# Patient Record
Sex: FEMALE | Race: WHITE | NOT HISPANIC OR LATINO | Employment: FULL TIME | URBAN - METROPOLITAN AREA
[De-identification: names, ages, dates, MRNs, and addresses within clinical notes are randomized per-mention and may not be internally consistent; named-entity substitution may affect disease eponyms.]

---

## 2017-08-11 ENCOUNTER — MEDICAL CORRESPONDENCE (OUTPATIENT)
Dept: HEALTH INFORMATION MANAGEMENT | Facility: CLINIC | Age: 62
End: 2017-08-11

## 2017-08-19 ENCOUNTER — HEALTH MAINTENANCE LETTER (OUTPATIENT)
Age: 62
End: 2017-08-19

## 2018-08-20 ENCOUNTER — HEALTH MAINTENANCE LETTER (OUTPATIENT)
Age: 63
End: 2018-08-20

## 2019-11-06 ENCOUNTER — HEALTH MAINTENANCE LETTER (OUTPATIENT)
Age: 64
End: 2019-11-06

## 2020-11-29 ENCOUNTER — HEALTH MAINTENANCE LETTER (OUTPATIENT)
Age: 65
End: 2020-11-29

## 2021-02-02 ENCOUNTER — VIRTUAL VISIT (OUTPATIENT)
Dept: FAMILY MEDICINE | Facility: CLINIC | Age: 66
End: 2021-02-02
Payer: COMMERCIAL

## 2021-02-02 DIAGNOSIS — Z99.81 SUPPLEMENTAL OXYGEN DEPENDENT: ICD-10-CM

## 2021-02-02 DIAGNOSIS — E66.01 MORBID OBESITY (H): ICD-10-CM

## 2021-02-02 DIAGNOSIS — R09.02 HYPOXEMIA: ICD-10-CM

## 2021-02-02 DIAGNOSIS — K08.9 POOR DENTITION: ICD-10-CM

## 2021-02-02 DIAGNOSIS — B07.8 OTHER VIRAL WARTS: ICD-10-CM

## 2021-02-02 DIAGNOSIS — G47.30 SLEEP APNEA, UNSPECIFIED TYPE: ICD-10-CM

## 2021-02-02 DIAGNOSIS — E66.2 OBESITY HYPOVENTILATION SYNDROME (H): Primary | ICD-10-CM

## 2021-02-02 DIAGNOSIS — Z87.09 H/O RESPIRATORY FAILURE: ICD-10-CM

## 2021-02-02 PROCEDURE — 99214 OFFICE O/P EST MOD 30 MIN: CPT | Mod: 95 | Performed by: NURSE PRACTITIONER

## 2021-02-02 NOTE — PROGRESS NOTES
Sherwin is a 65 year old who is being evaluated via a billable telephone visit.      What phone number would you like to be contacted at? [phone]  How would you like to obtain your AVS? MyChart  Assessment & Plan     Obesity hypoventilation syndrome (H)  Hypoxemia  H/O respiratory failure  Morbid obesity (H)  Supplemental oxygen dependent  - Advised patient that a face to face visit is required in order for me to order oxygen for her as testing in office is needed.  Patient reports she does not want to come in for a visit.  Offered care coordination referral but she declines.  She will continue with the same current oxygen therapy.    Sleep apnea, unspecified type  Order for BIPAP mask will be mailed to patient per her request.  - Miscellaneous Order for DME - ONLY FOR DME    Poor dentition  Advise follow up with dentistry.    Other viral warts  Recommend office visit for evaluation.  Briefly discussed over the counter treatment options.                               Return in about 1 week (around 2/9/2021) for face to face visit recommended.    Chiqui Gallegos NP  [facility]     Sherwin is a 65 year old who presents to clinic today for the following health issues     HPI     New patient to clinic, last seen in 2013 and has not received medical care since that time.    Requesting oxygen orders but a face-to-face visit would be required for oxygen orders.    [facility] -[phone]  Wants RX for mask for BIPAP  bipap 10 setting    Need appt. To keep Oxygen, getting supplies thru [facility].        Milk causing incontinence.    Foot warts are painful to walk.  Uses a walker.     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  Does not get regular dental care.  Asking about advise and advised to follow up with dentistry.    She is on 3L continuous nasal cannula, oxygen concentrator.  BIPAP at night for sleep  apnea.  Has a home oxygen sat monitor as well.  Was told by [facility] that she needs to be retested.    Car is in winter storage.  Hard to walk and get around.  Doesn't want to come into the clinic.    Previous diagnoses from 2013 include:  1. Chronic hypoxemia, multifactorial   2. Mixed restrictive and obstructive lung disease   3. Morbid obesity   4. Tracheobronchomalacia   5. RICKY   6. Obesity Hypoventilation Syndrome   7. Mild pulmonary hypertension    Her current oxygen prescription 6L with exertion and 3L at rest, though could titrate based on home oximeter reading as able to maintain sats > 88%.    Review of Systems   Constitutional, HEENT, cardiovascular, pulmonary, gi and gu systems are negative, except as otherwise noted.      Objective           Vitals:  No vitals were obtained today due to virtual visit.    Physical Exam   healthy, alert and no distress  PSYCH: Alert and oriented times 3; coherent speech, normal   rate and volume, able to articulate logical thoughts, able   to abstract reason, no tangential thoughts, no hallucinations   or delusions  Her affect is normal  RESP: No cough, no audible wheezing, able to talk in full sentences  Remainder of exam unable to be completed due to telephone visits              Phone call duration: 35 minutes with review of chart, review of patient concerns and review of ongoing plans.    Telephone visit (rather than a office visit) done today due to COVID-19 pandemic.

## 2021-02-03 ENCOUNTER — TELEPHONE (OUTPATIENT)
Dept: FAMILY MEDICINE | Facility: CLINIC | Age: 66
End: 2021-02-03

## 2021-02-03 NOTE — TELEPHONE ENCOUNTER
Forms received from [facility] [place] note documentation request (date range 2/2/2021) for Chiqui Gallegos CNP.  Forms placed in provider 'sign me' folder.  Please fax forms to [phone] after completion.    Zenon Deleon RT (R) (ARRT)  Radiology Dept

## 2021-02-09 NOTE — TELEPHONE ENCOUNTER
Faxed Virtual appointment note that Chiqui WHEELER has had with patient.  This is the only appointment we have done with this patient.    NANCY Bertrand  [facility]

## 2021-02-17 ENCOUNTER — TELEPHONE (OUTPATIENT)
Dept: FAMILY MEDICINE | Facility: CLINIC | Age: 66
End: 2021-02-17

## 2021-02-17 NOTE — TELEPHONE ENCOUNTER
Forms received from [facility] - Chart note documentation request (date range 2/19/2021 to 2/19/2021) for Chiqui Gallegos CNP.  Forms placed in provider 'sign me' folder.  Please fax forms to [phone] after completion.    Zenon Deleon RT (R) (ARRT)  Radiology Dept

## 2021-02-19 ENCOUNTER — OFFICE VISIT (OUTPATIENT)
Dept: FAMILY MEDICINE | Facility: CLINIC | Age: 66
End: 2021-02-19
Payer: COMMERCIAL

## 2021-02-19 DIAGNOSIS — E66.2 OBESITY HYPOVENTILATION SYNDROME (H): Primary | ICD-10-CM

## 2021-02-19 DIAGNOSIS — Z13.220 SCREENING FOR HYPERLIPIDEMIA: ICD-10-CM

## 2021-02-19 DIAGNOSIS — Z13.1 SCREENING FOR DIABETES MELLITUS: ICD-10-CM

## 2021-02-19 DIAGNOSIS — Z87.09 H/O RESPIRATORY FAILURE: ICD-10-CM

## 2021-02-19 DIAGNOSIS — Z99.81 SUPPLEMENTAL OXYGEN DEPENDENT: ICD-10-CM

## 2021-02-19 DIAGNOSIS — G47.30 SLEEP APNEA, UNSPECIFIED TYPE: ICD-10-CM

## 2021-02-19 DIAGNOSIS — R09.02 HYPOXEMIA: ICD-10-CM

## 2021-02-19 PROCEDURE — 99213 OFFICE O/P EST LOW 20 MIN: CPT | Performed by: NURSE PRACTITIONER

## 2021-02-19 PROCEDURE — 36415 COLL VENOUS BLD VENIPUNCTURE: CPT | Performed by: NURSE PRACTITIONER

## 2021-02-19 PROCEDURE — 80061 LIPID PANEL: CPT | Performed by: NURSE PRACTITIONER

## 2021-02-19 PROCEDURE — 80048 BASIC METABOLIC PNL TOTAL CA: CPT | Performed by: NURSE PRACTITIONER

## 2021-02-19 NOTE — PROGRESS NOTES
SpO2 testin) On room air at rest SpO2 is 87%  2) On 3 liters/minute at rest SpO2 is 96%  3) On room air during activity/with exercise SpO2 is 82%  4) On 3 liters/minute during activity/with exercise SpO2 is 88%    Assessment & Plan     Obesity hypoventilation syndrome (H)    - Home Oxygen Order for DME - ONLY FOR DME    Hypoxemia    - Home Oxygen Order for DME - ONLY FOR DME    Supplemental oxygen dependent    - Home Oxygen Order for DME - ONLY FOR DME    H/O respiratory failure    - Home Oxygen Order for DME - ONLY FOR DME    Sleep apnea, unspecified type  Chronic, stable, continue current treatment.     Screening for diabetes mellitus    - Basic metabolic panel  (Ca, Cl, CO2, Creat, Gluc, K, Na, BUN)    Screening for hyperlipidemia    - Lipid panel reflex to direct LDL Non-fasting    Patient has been oxygen dependent since .  Her oxygen saturation testing today supports her medical need of ongoing oxygen treatment.  Recommend continuing with 6L NC oxygen with exertion and 3L at rest. Though could titrate based on home oximeter reading as able to maintain sats >88%.    Will have [facility] to [facility] as requested.     Return in about 3 months (around 2021) for Physical Exam.         Chiqui Gallegos NP  [facility]    Maryann Courtney is a 65 year old who presents for the following health issues      HPI     Patient is here today to get update in home oxygen orders    Her current oxygen prescription 6L with exertion and 3L at rest. Though could titrate based on home oximeter reading as able to maintain sats >88%.      Review of Systems   Constitutional, HEENT, cardiovascular, pulmonary, GI, , musculoskeletal, neuro, skin, endocrine and psych systems are negative, except as otherwise noted.      Objective    /80 (BP Location: Right arm)   Pulse 105   Wt (!) 164.2 kg (362 lb)   SpO2 (!) 88%   BMI 60.24 kg/m    Body mass index is 60.24 kg/m .      SpO2 testin) On room air at rest SpO2 is 87%  2) On 3 liters/minute at rest SpO2 is 96%  3) On room air during activity/with exercise SpO2 is 82%  4) On 3 liters/minute during activity/with exercise SpO2 is 88%    Physical Exam   GENERAL: healthy, alert, no distress and obese  RESP: lungs clear to auscultation - no rales, rhonchi or wheezes  CV: regular rates and rhythm, normal S1 S2, no S3 or S4 and no murmur, click or rub  PSYCH: mentation appears normal, affect normal/bright    Results for orders placed or performed in visit on 21   Lipid panel reflex to direct LDL Non-fasting     Status: None   Result Value Ref Range    Cholesterol 171 <200 mg/dL    Triglycerides 118 <150 mg/dL    HDL Cholesterol 58 >49 mg/dL    LDL Cholesterol Calculated 89 <100 mg/dL    Non HDL Cholesterol 113 <130 mg/dL   Basic metabolic panel  (Ca, Cl, CO2, Creat, Gluc, K, Na, BUN)     Status: None   Result Value Ref Range    Sodium 139 133 - 144 mmol/L    Potassium 4.2 3.4 - 5.3 mmol/L    Chloride 103 94 - 109 mmol/L    Carbon Dioxide 31 20 - 32 mmol/L    Anion Gap 5 3 - 14 mmol/L    Glucose 87 70 - 99 mg/dL    Urea Nitrogen 13 7 - 30 mg/dL    Creatinine 0.75 0.52 - 1.04 mg/dL    GFR Estimate 84 >60 mL/min/[1.73_m2]    GFR Estimate If Black >90 >60 mL/min/[1.73_m2]    Calcium 9.4 8.5 - 10.1 mg/dL

## 2021-02-20 LAB
ANION GAP SERPL CALCULATED.3IONS-SCNC: 5 MMOL/L (ref 3–14)
BUN SERPL-MCNC: 13 MG/DL (ref 7–30)
CALCIUM SERPL-MCNC: 9.4 MG/DL (ref 8.5–10.1)
CHLORIDE SERPL-SCNC: 103 MMOL/L (ref 94–109)
CHOLEST SERPL-MCNC: 171 MG/DL
CO2 SERPL-SCNC: 31 MMOL/L (ref 20–32)
CREAT SERPL-MCNC: 0.75 MG/DL (ref 0.52–1.04)
GFR SERPL CREATININE-BSD FRML MDRD: 84 ML/MIN/{1.73_M2}
GLUCOSE SERPL-MCNC: 87 MG/DL (ref 70–99)
HDLC SERPL-MCNC: 58 MG/DL
LDLC SERPL CALC-MCNC: 89 MG/DL
NONHDLC SERPL-MCNC: 113 MG/DL
POTASSIUM SERPL-SCNC: 4.2 MMOL/L (ref 3.4–5.3)
SODIUM SERPL-SCNC: 139 MMOL/L (ref 133–144)
TRIGL SERPL-MCNC: 118 MG/DL

## 2021-02-21 VITALS
BODY MASS INDEX: 60.24 KG/M2 | SYSTOLIC BLOOD PRESSURE: 128 MMHG | OXYGEN SATURATION: 88 % | HEART RATE: 105 BPM | DIASTOLIC BLOOD PRESSURE: 80 MMHG | WEIGHT: 293 LBS

## 2021-02-22 NOTE — TELEPHONE ENCOUNTER
Please FAX to [facility] recent office visit note and DME oxygen order.   I placed the form in TC team zara wharton.    Chiqui Gallegos, NAYELI, APRN, CNP

## 2021-02-23 NOTE — TELEPHONE ENCOUNTER
Form with last OV notes faxed to Cyndy.  Cynthia Bajwa CMA ([facility])

## 2021-03-03 ENCOUNTER — TELEPHONE (OUTPATIENT)
Dept: FAMILY MEDICINE | Facility: CLINIC | Age: 66
End: 2021-03-03

## 2021-03-03 NOTE — TELEPHONE ENCOUNTER
Forms received from [facility]/ Certificate of Medical Necessity -484- Oxygen (initial date:2/19/21) for Chiqui Gallegos CNP.  Forms placed in provider 'sign me' folder.  Please fax forms to [phone] after completion.    Zenon Deleon RT (R) (ARRT)  Radiology Dept

## 2021-03-05 ENCOUNTER — MEDICAL CORRESPONDENCE (OUTPATIENT)
Dept: HEALTH INFORMATION MANAGEMENT | Facility: CLINIC | Age: 66
End: 2021-03-05

## 2021-03-05 NOTE — TELEPHONE ENCOUNTER
Form completed and placed into team zara  bin.    Please make sure we have a copy for our records.    Chiqui Gallegos, NAYELI, APRN, CNP

## 2021-03-05 NOTE — TELEPHONE ENCOUNTER
Unable to locate form that was faxed on 2/23. Spoke with Cyndy and they state they have not received this form, only records.    Arsh Ulrich

## 2021-03-09 NOTE — TELEPHONE ENCOUNTER
Form faxed to [facility], copy sent to abstract.  Cynthia Bajwa CMA ([facility])

## 2021-09-19 ENCOUNTER — HEALTH MAINTENANCE LETTER (OUTPATIENT)
Age: 66
End: 2021-09-19

## 2021-11-14 ENCOUNTER — HEALTH MAINTENANCE LETTER (OUTPATIENT)
Age: 66
End: 2021-11-14

## 2022-01-09 ENCOUNTER — HEALTH MAINTENANCE LETTER (OUTPATIENT)
Age: 67
End: 2022-01-09

## 2022-02-17 ENCOUNTER — TELEPHONE (OUTPATIENT)
Dept: FAMILY MEDICINE | Facility: CLINIC | Age: 67
End: 2022-02-17
Payer: COMMERCIAL

## 2022-02-17 NOTE — TELEPHONE ENCOUNTER
Forms received from [facility]/ Confirmation of Verbal Order for Home Medical Equipment (date: 2/17/22) for Chiqui Gallegos NP.  Forms placed in provider 'sign me' folder.  Please fax forms to [phone] after completion.    Zenon Deleon RT (R) (ARRT)  Radiology Dept

## 2022-11-21 ENCOUNTER — HEALTH MAINTENANCE LETTER (OUTPATIENT)
Age: 67
End: 2022-11-21

## 2023-01-01 ENCOUNTER — HEALTH MAINTENANCE LETTER (OUTPATIENT)
Age: 68
End: 2023-01-01

## 2023-01-23 ENCOUNTER — TELEPHONE (OUTPATIENT)
Dept: FAMILY MEDICINE | Facility: CLINIC | Age: 68
End: 2023-01-23
Payer: COMMERCIAL

## 2023-01-23 NOTE — TELEPHONE ENCOUNTER
Forms received from Letter of Medical Necessity for sleep Apnea for Chiqui Gallegos.  Forms placed in provider 'sign me' folder.  Please FAX forms to [phone] after completion.      Marcia Bejarano    [facility]

## 2023-02-27 NOTE — TELEPHONE ENCOUNTER
----- Message from Chasity Hernandez on behalf of Katlin Hernandez sent at 2/27/2023  2:57 PM EST -----  Regarding: Cough, and dark circles under eyes  Contact: [phone]  This message is being sent by Chasity Hernandez on behalf of Katlin Hernandez.    Katlin has tested negative for COVID twice now. I am only one in house testing positive. I just need to get Katlin in for her cough.    I believe I already did the required form for home oxygen.  Please see 2/17/21 telephone encounter and can you call to verify if they received form at that time?  This form looks the same to me.    Chiqui Gallegos, DNP, APRN, CNP

## 2023-04-16 ENCOUNTER — HEALTH MAINTENANCE LETTER (OUTPATIENT)
Age: 68
End: 2023-04-16

## 2024-01-01 ENCOUNTER — APPOINTMENT (OUTPATIENT)
Dept: ULTRASOUND IMAGING | Facility: CLINIC | Age: 69
DRG: 749 | End: 2024-01-01
Attending: EMERGENCY MEDICINE
Payer: COMMERCIAL

## 2024-01-01 ENCOUNTER — APPOINTMENT (OUTPATIENT)
Dept: OCCUPATIONAL THERAPY | Facility: CLINIC | Age: 69
DRG: 749 | End: 2024-01-01
Payer: COMMERCIAL

## 2024-01-01 ENCOUNTER — APPOINTMENT (OUTPATIENT)
Dept: CT IMAGING | Facility: CLINIC | Age: 69
DRG: 749 | End: 2024-01-01
Attending: EMERGENCY MEDICINE
Payer: COMMERCIAL

## 2024-01-01 ENCOUNTER — APPOINTMENT (OUTPATIENT)
Dept: CT IMAGING | Facility: CLINIC | Age: 69
DRG: 749 | End: 2024-01-01
Attending: NURSE PRACTITIONER
Payer: COMMERCIAL

## 2024-01-01 ENCOUNTER — HOSPITAL ENCOUNTER (INPATIENT)
Facility: CLINIC | Age: 69
LOS: 7 days | DRG: 749 | End: 2024-09-12
Attending: EMERGENCY MEDICINE | Admitting: INTERNAL MEDICINE
Payer: COMMERCIAL

## 2024-01-01 ENCOUNTER — APPOINTMENT (OUTPATIENT)
Dept: GENERAL RADIOLOGY | Facility: CLINIC | Age: 69
DRG: 749 | End: 2024-01-01
Attending: PHYSICIAN ASSISTANT
Payer: COMMERCIAL

## 2024-01-01 ENCOUNTER — NURSE TRIAGE (OUTPATIENT)
Dept: FAMILY MEDICINE | Facility: CLINIC | Age: 69
End: 2024-01-01
Payer: COMMERCIAL

## 2024-01-01 ENCOUNTER — APPOINTMENT (OUTPATIENT)
Dept: CARDIOLOGY | Facility: CLINIC | Age: 69
DRG: 749 | End: 2024-01-01
Attending: NURSE PRACTITIONER
Payer: COMMERCIAL

## 2024-01-01 ENCOUNTER — APPOINTMENT (OUTPATIENT)
Dept: INTERVENTIONAL RADIOLOGY/VASCULAR | Facility: CLINIC | Age: 69
DRG: 749 | End: 2024-01-01
Attending: PHYSICIAN ASSISTANT
Payer: COMMERCIAL

## 2024-01-01 ENCOUNTER — APPOINTMENT (OUTPATIENT)
Dept: CARDIOLOGY | Facility: CLINIC | Age: 69
DRG: 749 | End: 2024-01-01
Attending: PHYSICIAN ASSISTANT
Payer: COMMERCIAL

## 2024-01-01 ENCOUNTER — APPOINTMENT (OUTPATIENT)
Dept: OCCUPATIONAL THERAPY | Facility: CLINIC | Age: 69
DRG: 749 | End: 2024-01-01
Attending: INTERNAL MEDICINE
Payer: COMMERCIAL

## 2024-01-01 ENCOUNTER — APPOINTMENT (OUTPATIENT)
Dept: GENERAL RADIOLOGY | Facility: CLINIC | Age: 69
DRG: 749 | End: 2024-01-01
Attending: EMERGENCY MEDICINE
Payer: COMMERCIAL

## 2024-01-01 ENCOUNTER — HEALTH MAINTENANCE LETTER (OUTPATIENT)
Age: 69
End: 2024-01-01

## 2024-01-01 ENCOUNTER — APPOINTMENT (OUTPATIENT)
Dept: GENERAL RADIOLOGY | Facility: CLINIC | Age: 69
DRG: 749 | End: 2024-01-01
Attending: INTERNAL MEDICINE
Payer: COMMERCIAL

## 2024-01-01 VITALS
RESPIRATION RATE: 15 BRPM | DIASTOLIC BLOOD PRESSURE: 53 MMHG | WEIGHT: 260.36 LBS | SYSTOLIC BLOOD PRESSURE: 106 MMHG | BODY MASS INDEX: 41.84 KG/M2 | HEART RATE: 71 BPM | HEIGHT: 66 IN | TEMPERATURE: 97.5 F | OXYGEN SATURATION: 93 %

## 2024-01-01 DIAGNOSIS — J96.21 ACUTE AND CHRONIC RESPIRATORY FAILURE WITH HYPOXIA (H): ICD-10-CM

## 2024-01-01 DIAGNOSIS — I26.94 MULTIPLE SUBSEGMENTAL PULMONARY EMBOLI WITHOUT ACUTE COR PULMONALE (H): ICD-10-CM

## 2024-01-01 DIAGNOSIS — D64.9 ANEMIA, UNSPECIFIED TYPE: Primary | ICD-10-CM

## 2024-01-01 DIAGNOSIS — L89.896 PRESSURE INJURY OF DEEP TISSUE OF OTHER SITE: ICD-10-CM

## 2024-01-01 LAB
ABO/RH(D): NORMAL
ABO/RH(D): NORMAL
ALBUMIN SERPL BCG-MCNC: 2.3 G/DL (ref 3.5–5.2)
ALBUMIN SERPL BCG-MCNC: 2.4 G/DL (ref 3.5–5.2)
ALBUMIN SERPL BCG-MCNC: 2.7 G/DL (ref 3.5–5.2)
ALBUMIN UR-MCNC: 30 MG/DL
ALP SERPL-CCNC: 114 U/L (ref 40–150)
ALP SERPL-CCNC: 115 U/L (ref 40–150)
ALP SERPL-CCNC: 126 U/L (ref 40–150)
ALT SERPL W P-5'-P-CCNC: 11 U/L (ref 0–50)
ALT SERPL W P-5'-P-CCNC: <5 U/L (ref 0–50)
ALT SERPL W P-5'-P-CCNC: <5 U/L (ref 0–50)
ANION GAP SERPL CALCULATED.3IONS-SCNC: 10 MMOL/L (ref 7–15)
ANION GAP SERPL CALCULATED.3IONS-SCNC: 10 MMOL/L (ref 7–15)
ANION GAP SERPL CALCULATED.3IONS-SCNC: 11 MMOL/L (ref 7–15)
ANION GAP SERPL CALCULATED.3IONS-SCNC: 14 MMOL/L (ref 7–15)
ANION GAP SERPL CALCULATED.3IONS-SCNC: 17 MMOL/L (ref 7–15)
ANION GAP SERPL CALCULATED.3IONS-SCNC: 6 MMOL/L (ref 7–15)
ANION GAP SERPL CALCULATED.3IONS-SCNC: 7 MMOL/L (ref 7–15)
ANION GAP SERPL CALCULATED.3IONS-SCNC: 8 MMOL/L (ref 7–15)
ANION GAP SERPL CALCULATED.3IONS-SCNC: 8 MMOL/L (ref 7–15)
ANION GAP SERPL CALCULATED.3IONS-SCNC: 9 MMOL/L (ref 7–15)
ANION GAP SERPL CALCULATED.3IONS-SCNC: 9 MMOL/L (ref 7–15)
ANTIBODY SCREEN: NEGATIVE
ANTIBODY SCREEN: NEGATIVE
APPEARANCE UR: CLEAR
AST SERPL W P-5'-P-CCNC: 17 U/L (ref 0–45)
AST SERPL W P-5'-P-CCNC: 17 U/L (ref 0–45)
AST SERPL W P-5'-P-CCNC: 18 U/L (ref 0–45)
ATRIAL RATE - MUSE: 108 BPM
ATRIAL RATE - MUSE: 116 BPM
ATRIAL RATE - MUSE: 85 BPM
ATRIAL RATE - MUSE: 89 BPM
ATRIAL RATE - MUSE: 89 BPM
ATRIAL RATE - MUSE: 91 BPM
B-OH-BUTYR SERPL-SCNC: <0.18 MMOL/L
BACTERIA BLD CULT: NO GROWTH
BACTERIA BLD CULT: NO GROWTH
BASE EXCESS BLDV CALC-SCNC: -2.2 MMOL/L (ref -3–3)
BASE EXCESS BLDV CALC-SCNC: -2.7 MMOL/L (ref -3–3)
BASE EXCESS BLDV CALC-SCNC: -2.9 MMOL/L (ref -3–3)
BASE EXCESS BLDV CALC-SCNC: -3.3 MMOL/L (ref -3–3)
BASE EXCESS BLDV CALC-SCNC: -4.6 MMOL/L (ref -3–3)
BASE EXCESS BLDV CALC-SCNC: -4.6 MMOL/L (ref -3–3)
BASE EXCESS BLDV CALC-SCNC: -5.8 MMOL/L (ref -3–3)
BASE EXCESS BLDV CALC-SCNC: -7.6 MMOL/L (ref -3–3)
BASOPHILS # BLD AUTO: 0 10E3/UL (ref 0–0.2)
BASOPHILS # BLD AUTO: 0.1 10E3/UL (ref 0–0.2)
BASOPHILS NFR BLD AUTO: 0 %
BILIRUB SERPL-MCNC: 0.3 MG/DL
BILIRUB UR QL STRIP: NEGATIVE
BLD PROD TYP BPU: NORMAL
BLOOD COMPONENT TYPE: NORMAL
BUN SERPL-MCNC: 27.8 MG/DL (ref 8–23)
BUN SERPL-MCNC: 28 MG/DL (ref 8–23)
BUN SERPL-MCNC: 28.1 MG/DL (ref 8–23)
BUN SERPL-MCNC: 28.4 MG/DL (ref 8–23)
BUN SERPL-MCNC: 29.1 MG/DL (ref 8–23)
BUN SERPL-MCNC: 29.6 MG/DL (ref 8–23)
BUN SERPL-MCNC: 29.7 MG/DL (ref 8–23)
BUN SERPL-MCNC: 30 MG/DL (ref 8–23)
BUN SERPL-MCNC: 30.9 MG/DL (ref 8–23)
BUN SERPL-MCNC: 32.9 MG/DL (ref 8–23)
BUN SERPL-MCNC: 33 MG/DL (ref 8–23)
BUN SERPL-MCNC: 35.5 MG/DL (ref 8–23)
BUN SERPL-MCNC: 36.2 MG/DL (ref 8–23)
C PNEUM DNA SPEC QL NAA+PROBE: NOT DETECTED
C PNEUM DNA SPEC QL NAA+PROBE: NOT DETECTED
CALCIUM SERPL-MCNC: 7 MG/DL (ref 8.8–10.4)
CALCIUM SERPL-MCNC: 7.8 MG/DL (ref 8.8–10.4)
CALCIUM SERPL-MCNC: 7.9 MG/DL (ref 8.8–10.4)
CALCIUM SERPL-MCNC: 7.9 MG/DL (ref 8.8–10.4)
CALCIUM SERPL-MCNC: 8 MG/DL (ref 8.8–10.4)
CALCIUM SERPL-MCNC: 8 MG/DL (ref 8.8–10.4)
CALCIUM SERPL-MCNC: 8.1 MG/DL (ref 8.8–10.4)
CALCIUM SERPL-MCNC: 8.2 MG/DL (ref 8.8–10.4)
CALCIUM SERPL-MCNC: 8.2 MG/DL (ref 8.8–10.4)
CHLORIDE SERPL-SCNC: 105 MMOL/L (ref 98–107)
CHLORIDE SERPL-SCNC: 106 MMOL/L (ref 98–107)
CHLORIDE SERPL-SCNC: 109 MMOL/L (ref 98–107)
CHLORIDE SERPL-SCNC: 110 MMOL/L (ref 98–107)
CHLORIDE SERPL-SCNC: 112 MMOL/L (ref 98–107)
CHLORIDE SERPL-SCNC: 112 MMOL/L (ref 98–107)
CODING SYSTEM: NORMAL
COLOR UR AUTO: ABNORMAL
CREAT SERPL-MCNC: 0.99 MG/DL (ref 0.51–0.95)
CREAT SERPL-MCNC: 0.99 MG/DL (ref 0.51–0.95)
CREAT SERPL-MCNC: 1 MG/DL (ref 0.51–0.95)
CREAT SERPL-MCNC: 1.01 MG/DL (ref 0.51–0.95)
CREAT SERPL-MCNC: 1.01 MG/DL (ref 0.51–0.95)
CREAT SERPL-MCNC: 1.07 MG/DL (ref 0.51–0.95)
CREAT SERPL-MCNC: 1.07 MG/DL (ref 0.51–0.95)
CREAT SERPL-MCNC: 1.09 MG/DL (ref 0.51–0.95)
CREAT SERPL-MCNC: 1.19 MG/DL (ref 0.51–0.95)
CREAT SERPL-MCNC: 1.22 MG/DL (ref 0.51–0.95)
CREAT SERPL-MCNC: 1.53 MG/DL (ref 0.51–0.95)
CREAT SERPL-MCNC: 1.53 MG/DL (ref 0.51–0.95)
CREAT SERPL-MCNC: 1.63 MG/DL (ref 0.51–0.95)
CROSSMATCH: NORMAL
D DIMER PPP FEU-MCNC: 2.91 UG/ML FEU (ref 0–0.5)
DIASTOLIC BLOOD PRESSURE - MUSE: NORMAL MMHG
EGFRCR SERPLBLD CKD-EPI 2021: 34 ML/MIN/1.73M2
EGFRCR SERPLBLD CKD-EPI 2021: 37 ML/MIN/1.73M2
EGFRCR SERPLBLD CKD-EPI 2021: 37 ML/MIN/1.73M2
EGFRCR SERPLBLD CKD-EPI 2021: 48 ML/MIN/1.73M2
EGFRCR SERPLBLD CKD-EPI 2021: 50 ML/MIN/1.73M2
EGFRCR SERPLBLD CKD-EPI 2021: 55 ML/MIN/1.73M2
EGFRCR SERPLBLD CKD-EPI 2021: 56 ML/MIN/1.73M2
EGFRCR SERPLBLD CKD-EPI 2021: 56 ML/MIN/1.73M2
EGFRCR SERPLBLD CKD-EPI 2021: 60 ML/MIN/1.73M2
EGFRCR SERPLBLD CKD-EPI 2021: 60 ML/MIN/1.73M2
EGFRCR SERPLBLD CKD-EPI 2021: 61 ML/MIN/1.73M2
EGFRCR SERPLBLD CKD-EPI 2021: 62 ML/MIN/1.73M2
EGFRCR SERPLBLD CKD-EPI 2021: 62 ML/MIN/1.73M2
EOSINOPHIL # BLD AUTO: 0 10E3/UL (ref 0–0.7)
EOSINOPHIL # BLD AUTO: 0.1 10E3/UL (ref 0–0.7)
EOSINOPHIL # BLD AUTO: 0.2 10E3/UL (ref 0–0.7)
EOSINOPHIL # BLD AUTO: 0.2 10E3/UL (ref 0–0.7)
EOSINOPHIL # BLD AUTO: 0.3 10E3/UL (ref 0–0.7)
EOSINOPHIL # BLD AUTO: 0.3 10E3/UL (ref 0–0.7)
EOSINOPHIL # BLD AUTO: 0.4 10E3/UL (ref 0–0.7)
EOSINOPHIL # BLD AUTO: 0.6 10E3/UL (ref 0–0.7)
EOSINOPHIL # BLD AUTO: 0.6 10E3/UL (ref 0–0.7)
EOSINOPHIL NFR BLD AUTO: 0 %
EOSINOPHIL NFR BLD AUTO: 0 %
EOSINOPHIL NFR BLD AUTO: 1 %
EOSINOPHIL NFR BLD AUTO: 2 %
ERYTHROCYTE [DISTWIDTH] IN BLOOD BY AUTOMATED COUNT: 22.5 % (ref 10–15)
ERYTHROCYTE [DISTWIDTH] IN BLOOD BY AUTOMATED COUNT: 22.7 % (ref 10–15)
ERYTHROCYTE [DISTWIDTH] IN BLOOD BY AUTOMATED COUNT: 22.7 % (ref 10–15)
ERYTHROCYTE [DISTWIDTH] IN BLOOD BY AUTOMATED COUNT: 22.8 % (ref 10–15)
ERYTHROCYTE [DISTWIDTH] IN BLOOD BY AUTOMATED COUNT: 22.8 % (ref 10–15)
ERYTHROCYTE [DISTWIDTH] IN BLOOD BY AUTOMATED COUNT: 23.1 % (ref 10–15)
ERYTHROCYTE [DISTWIDTH] IN BLOOD BY AUTOMATED COUNT: 23.2 % (ref 10–15)
ERYTHROCYTE [DISTWIDTH] IN BLOOD BY AUTOMATED COUNT: 23.2 % (ref 10–15)
ERYTHROCYTE [DISTWIDTH] IN BLOOD BY AUTOMATED COUNT: 23.7 % (ref 10–15)
ERYTHROCYTE [DISTWIDTH] IN BLOOD BY AUTOMATED COUNT: 23.7 % (ref 10–15)
FERRITIN SERPL-MCNC: 7 NG/ML (ref 11–328)
FLUAV H1 2009 PAND RNA SPEC QL NAA+PROBE: NOT DETECTED
FLUAV H1 2009 PAND RNA SPEC QL NAA+PROBE: NOT DETECTED
FLUAV H1 RNA SPEC QL NAA+PROBE: NOT DETECTED
FLUAV H1 RNA SPEC QL NAA+PROBE: NOT DETECTED
FLUAV H3 RNA SPEC QL NAA+PROBE: NOT DETECTED
FLUAV H3 RNA SPEC QL NAA+PROBE: NOT DETECTED
FLUAV RNA SPEC QL NAA+PROBE: NOT DETECTED
FLUAV RNA SPEC QL NAA+PROBE: NOT DETECTED
FLUBV RNA SPEC QL NAA+PROBE: NOT DETECTED
FLUBV RNA SPEC QL NAA+PROBE: NOT DETECTED
GLUCOSE BLDC GLUCOMTR-MCNC: 100 MG/DL (ref 70–99)
GLUCOSE BLDC GLUCOMTR-MCNC: 101 MG/DL (ref 70–99)
GLUCOSE BLDC GLUCOMTR-MCNC: 101 MG/DL (ref 70–99)
GLUCOSE BLDC GLUCOMTR-MCNC: 109 MG/DL (ref 70–99)
GLUCOSE BLDC GLUCOMTR-MCNC: 111 MG/DL (ref 70–99)
GLUCOSE BLDC GLUCOMTR-MCNC: 112 MG/DL (ref 70–99)
GLUCOSE BLDC GLUCOMTR-MCNC: 136 MG/DL (ref 70–99)
GLUCOSE BLDC GLUCOMTR-MCNC: 62 MG/DL (ref 70–99)
GLUCOSE BLDC GLUCOMTR-MCNC: 68 MG/DL (ref 70–99)
GLUCOSE BLDC GLUCOMTR-MCNC: 70 MG/DL (ref 70–99)
GLUCOSE BLDC GLUCOMTR-MCNC: 73 MG/DL (ref 70–99)
GLUCOSE BLDC GLUCOMTR-MCNC: 74 MG/DL (ref 70–99)
GLUCOSE BLDC GLUCOMTR-MCNC: 75 MG/DL (ref 70–99)
GLUCOSE BLDC GLUCOMTR-MCNC: 76 MG/DL (ref 70–99)
GLUCOSE BLDC GLUCOMTR-MCNC: 77 MG/DL (ref 70–99)
GLUCOSE BLDC GLUCOMTR-MCNC: 78 MG/DL (ref 70–99)
GLUCOSE BLDC GLUCOMTR-MCNC: 79 MG/DL (ref 70–99)
GLUCOSE BLDC GLUCOMTR-MCNC: 81 MG/DL (ref 70–99)
GLUCOSE BLDC GLUCOMTR-MCNC: 83 MG/DL (ref 70–99)
GLUCOSE BLDC GLUCOMTR-MCNC: 84 MG/DL (ref 70–99)
GLUCOSE BLDC GLUCOMTR-MCNC: 84 MG/DL (ref 70–99)
GLUCOSE BLDC GLUCOMTR-MCNC: 85 MG/DL (ref 70–99)
GLUCOSE BLDC GLUCOMTR-MCNC: 86 MG/DL (ref 70–99)
GLUCOSE BLDC GLUCOMTR-MCNC: 86 MG/DL (ref 70–99)
GLUCOSE BLDC GLUCOMTR-MCNC: 88 MG/DL (ref 70–99)
GLUCOSE BLDC GLUCOMTR-MCNC: 89 MG/DL (ref 70–99)
GLUCOSE BLDC GLUCOMTR-MCNC: 89 MG/DL (ref 70–99)
GLUCOSE BLDC GLUCOMTR-MCNC: 93 MG/DL (ref 70–99)
GLUCOSE BLDC GLUCOMTR-MCNC: 94 MG/DL (ref 70–99)
GLUCOSE BLDC GLUCOMTR-MCNC: 99 MG/DL (ref 70–99)
GLUCOSE SERPL-MCNC: 104 MG/DL (ref 70–99)
GLUCOSE SERPL-MCNC: 105 MG/DL (ref 70–99)
GLUCOSE SERPL-MCNC: 106 MG/DL (ref 70–99)
GLUCOSE SERPL-MCNC: 111 MG/DL (ref 70–99)
GLUCOSE SERPL-MCNC: 61 MG/DL (ref 70–99)
GLUCOSE SERPL-MCNC: 64 MG/DL (ref 70–99)
GLUCOSE SERPL-MCNC: 70 MG/DL (ref 70–99)
GLUCOSE SERPL-MCNC: 72 MG/DL (ref 70–99)
GLUCOSE SERPL-MCNC: 73 MG/DL (ref 70–99)
GLUCOSE SERPL-MCNC: 76 MG/DL (ref 70–99)
GLUCOSE SERPL-MCNC: 84 MG/DL (ref 70–99)
GLUCOSE SERPL-MCNC: 84 MG/DL (ref 70–99)
GLUCOSE SERPL-MCNC: 85 MG/DL (ref 70–99)
GLUCOSE UR STRIP-MCNC: NEGATIVE MG/DL
HADV DNA SPEC QL NAA+PROBE: NOT DETECTED
HADV DNA SPEC QL NAA+PROBE: NOT DETECTED
HCO3 BLDV-SCNC: 21 MMOL/L (ref 21–28)
HCO3 BLDV-SCNC: 23 MMOL/L (ref 21–28)
HCO3 BLDV-SCNC: 24 MMOL/L (ref 21–28)
HCO3 SERPL-SCNC: 19 MMOL/L (ref 22–29)
HCO3 SERPL-SCNC: 19 MMOL/L (ref 22–29)
HCO3 SERPL-SCNC: 20 MMOL/L (ref 22–29)
HCO3 SERPL-SCNC: 20 MMOL/L (ref 22–29)
HCO3 SERPL-SCNC: 21 MMOL/L (ref 22–29)
HCO3 SERPL-SCNC: 21 MMOL/L (ref 22–29)
HCO3 SERPL-SCNC: 22 MMOL/L (ref 22–29)
HCO3 SERPL-SCNC: 23 MMOL/L (ref 22–29)
HCOV PNL SPEC NAA+PROBE: NOT DETECTED
HCOV PNL SPEC NAA+PROBE: NOT DETECTED
HCT VFR BLD AUTO: 18.6 % (ref 35–47)
HCT VFR BLD AUTO: 23.5 % (ref 35–47)
HCT VFR BLD AUTO: 24.9 % (ref 35–47)
HCT VFR BLD AUTO: 25 % (ref 35–47)
HCT VFR BLD AUTO: 25.5 % (ref 35–47)
HCT VFR BLD AUTO: 25.6 % (ref 35–47)
HCT VFR BLD AUTO: 25.7 % (ref 35–47)
HCT VFR BLD AUTO: 25.8 % (ref 35–47)
HCT VFR BLD AUTO: 26.1 % (ref 35–47)
HCT VFR BLD AUTO: 27 % (ref 35–47)
HCT VFR BLD AUTO: 27.1 % (ref 35–47)
HCT VFR BLD AUTO: 28.8 % (ref 35–47)
HCT VFR BLD AUTO: 28.9 % (ref 35–47)
HCT VFR BLD AUTO: 29 % (ref 35–47)
HGB BLD-MCNC: 4.9 G/DL (ref 11.7–15.7)
HGB BLD-MCNC: 6.8 G/DL (ref 11.7–15.7)
HGB BLD-MCNC: 6.8 G/DL (ref 11.7–15.7)
HGB BLD-MCNC: 7 G/DL (ref 11.7–15.7)
HGB BLD-MCNC: 7.2 G/DL (ref 11.7–15.7)
HGB BLD-MCNC: 7.3 G/DL (ref 11.7–15.7)
HGB BLD-MCNC: 7.4 G/DL (ref 11.7–15.7)
HGB BLD-MCNC: 7.4 G/DL (ref 11.7–15.7)
HGB BLD-MCNC: 7.5 G/DL (ref 11.7–15.7)
HGB BLD-MCNC: 7.5 G/DL (ref 11.7–15.7)
HGB BLD-MCNC: 7.7 G/DL (ref 11.7–15.7)
HGB BLD-MCNC: 7.7 G/DL (ref 11.7–15.7)
HGB BLD-MCNC: 7.8 G/DL (ref 11.7–15.7)
HGB BLD-MCNC: 7.9 G/DL (ref 11.7–15.7)
HGB BLD-MCNC: 8 G/DL (ref 11.7–15.7)
HGB BLD-MCNC: 8 G/DL (ref 11.7–15.7)
HGB BLD-MCNC: 8.1 G/DL (ref 11.7–15.7)
HGB BLD-MCNC: 8.2 G/DL (ref 11.7–15.7)
HGB BLD-MCNC: 8.2 G/DL (ref 11.7–15.7)
HGB UR QL STRIP: NEGATIVE
HMPV RNA SPEC QL NAA+PROBE: NOT DETECTED
HMPV RNA SPEC QL NAA+PROBE: NOT DETECTED
HOLD SPECIMEN: NORMAL
HOLD SPECIMEN: NORMAL
HPIV1 RNA SPEC QL NAA+PROBE: NOT DETECTED
HPIV1 RNA SPEC QL NAA+PROBE: NOT DETECTED
HPIV2 RNA SPEC QL NAA+PROBE: NOT DETECTED
HPIV2 RNA SPEC QL NAA+PROBE: NOT DETECTED
HPIV3 RNA SPEC QL NAA+PROBE: NOT DETECTED
HPIV3 RNA SPEC QL NAA+PROBE: NOT DETECTED
HPIV4 RNA SPEC QL NAA+PROBE: NOT DETECTED
HPIV4 RNA SPEC QL NAA+PROBE: NOT DETECTED
IMM GRANULOCYTES # BLD: 0.2 10E3/UL
IMM GRANULOCYTES # BLD: 0.3 10E3/UL
IMM GRANULOCYTES # BLD: 0.3 10E3/UL
IMM GRANULOCYTES NFR BLD: 1 %
IMM GRANULOCYTES NFR BLD: 2 %
INR PPP: 1.4 (ref 0.85–1.15)
INTERPRETATION ECG - MUSE: NORMAL
IRON BINDING CAPACITY (ROCHE): 248 UG/DL (ref 240–430)
IRON SATN MFR SERPL: 4 % (ref 15–46)
IRON SERPL-MCNC: 9 UG/DL (ref 37–145)
ISSUE DATE AND TIME: NORMAL
KETONES UR STRIP-MCNC: NEGATIVE MG/DL
LAB DIRECTOR COMMENTS: NORMAL
LAB DIRECTOR DISCLAIMER: NORMAL
LAB DIRECTOR INTERPRETATION: NORMAL
LAB DIRECTOR METHODOLOGY: NORMAL
LAB DIRECTOR RESULTS: NORMAL
LACTATE SERPL-SCNC: 0.6 MMOL/L (ref 0.7–2)
LACTATE SERPL-SCNC: 0.7 MMOL/L (ref 0.7–2)
LACTATE SERPL-SCNC: 0.9 MMOL/L (ref 0.7–2)
LACTATE SERPL-SCNC: 1 MMOL/L (ref 0.7–2)
LACTATE SERPL-SCNC: 1.1 MMOL/L (ref 0.7–2)
LACTATE SERPL-SCNC: 1.2 MMOL/L (ref 0.7–2)
LEUKOCYTE ESTERASE UR QL STRIP: NEGATIVE
LIPASE SERPL-CCNC: 14 U/L (ref 13–60)
LVEF ECHO: NORMAL
LVEF ECHO: NORMAL
LYMPHOCYTES # BLD AUTO: 1.1 10E3/UL (ref 0.8–5.3)
LYMPHOCYTES # BLD AUTO: 1.2 10E3/UL (ref 0.8–5.3)
LYMPHOCYTES # BLD AUTO: 1.4 10E3/UL (ref 0.8–5.3)
LYMPHOCYTES # BLD AUTO: 1.4 10E3/UL (ref 0.8–5.3)
LYMPHOCYTES # BLD AUTO: 1.6 10E3/UL (ref 0.8–5.3)
LYMPHOCYTES # BLD AUTO: 1.7 10E3/UL (ref 0.8–5.3)
LYMPHOCYTES # BLD AUTO: 1.8 10E3/UL (ref 0.8–5.3)
LYMPHOCYTES NFR BLD AUTO: 4 %
LYMPHOCYTES NFR BLD AUTO: 5 %
LYMPHOCYTES NFR BLD AUTO: 5 %
LYMPHOCYTES NFR BLD AUTO: 6 %
LYMPHOCYTES NFR BLD AUTO: 6 %
LYMPHOCYTES NFR BLD AUTO: 8 %
LYMPHOCYTES NFR BLD AUTO: 8 %
LYMPHOCYTES NFR BLD AUTO: 9 %
LYMPHOCYTES NFR BLD AUTO: 9 %
M PNEUMO DNA SPEC QL NAA+PROBE: NOT DETECTED
M PNEUMO DNA SPEC QL NAA+PROBE: NOT DETECTED
MAGNESIUM SERPL-MCNC: 1.9 MG/DL (ref 1.7–2.3)
MAGNESIUM SERPL-MCNC: 2.1 MG/DL (ref 1.7–2.3)
MAGNESIUM SERPL-MCNC: 2.2 MG/DL (ref 1.7–2.3)
MCH RBC QN AUTO: 18.6 PG (ref 26.5–33)
MCH RBC QN AUTO: 21.6 PG (ref 26.5–33)
MCH RBC QN AUTO: 21.6 PG (ref 26.5–33)
MCH RBC QN AUTO: 21.7 PG (ref 26.5–33)
MCH RBC QN AUTO: 21.8 PG (ref 26.5–33)
MCH RBC QN AUTO: 22 PG (ref 26.5–33)
MCH RBC QN AUTO: 22.7 PG (ref 26.5–33)
MCH RBC QN AUTO: 22.9 PG (ref 26.5–33)
MCH RBC QN AUTO: 23 PG (ref 26.5–33)
MCH RBC QN AUTO: 23 PG (ref 26.5–33)
MCH RBC QN AUTO: 23.1 PG (ref 26.5–33)
MCH RBC QN AUTO: 23.1 PG (ref 26.5–33)
MCH RBC QN AUTO: 23.2 PG (ref 26.5–33)
MCH RBC QN AUTO: 23.2 PG (ref 26.5–33)
MCHC RBC AUTO-ENTMCNC: 26.3 G/DL (ref 31.5–36.5)
MCHC RBC AUTO-ENTMCNC: 27.3 G/DL (ref 31.5–36.5)
MCHC RBC AUTO-ENTMCNC: 27.4 G/DL (ref 31.5–36.5)
MCHC RBC AUTO-ENTMCNC: 27.9 G/DL (ref 31.5–36.5)
MCHC RBC AUTO-ENTMCNC: 27.9 G/DL (ref 31.5–36.5)
MCHC RBC AUTO-ENTMCNC: 28 G/DL (ref 31.5–36.5)
MCHC RBC AUTO-ENTMCNC: 28.1 G/DL (ref 31.5–36.5)
MCHC RBC AUTO-ENTMCNC: 28.1 G/DL (ref 31.5–36.5)
MCHC RBC AUTO-ENTMCNC: 28.4 G/DL (ref 31.5–36.5)
MCHC RBC AUTO-ENTMCNC: 28.6 G/DL (ref 31.5–36.5)
MCHC RBC AUTO-ENTMCNC: 28.9 G/DL (ref 31.5–36.5)
MCHC RBC AUTO-ENTMCNC: 29.2 G/DL (ref 31.5–36.5)
MCV RBC AUTO: 71 FL (ref 78–100)
MCV RBC AUTO: 77 FL (ref 78–100)
MCV RBC AUTO: 77 FL (ref 78–100)
MCV RBC AUTO: 79 FL (ref 78–100)
MCV RBC AUTO: 80 FL (ref 78–100)
MCV RBC AUTO: 80 FL (ref 78–100)
MCV RBC AUTO: 81 FL (ref 78–100)
MCV RBC AUTO: 82 FL (ref 78–100)
MCV RBC AUTO: 82 FL (ref 78–100)
MONOCYTES # BLD AUTO: 0.9 10E3/UL (ref 0–1.3)
MONOCYTES # BLD AUTO: 1 10E3/UL (ref 0–1.3)
MONOCYTES # BLD AUTO: 1.1 10E3/UL (ref 0–1.3)
MONOCYTES # BLD AUTO: 1.2 10E3/UL (ref 0–1.3)
MONOCYTES # BLD AUTO: 1.2 10E3/UL (ref 0–1.3)
MONOCYTES # BLD AUTO: 1.3 10E3/UL (ref 0–1.3)
MONOCYTES # BLD AUTO: 1.4 10E3/UL (ref 0–1.3)
MONOCYTES NFR BLD AUTO: 4 %
MONOCYTES NFR BLD AUTO: 5 %
MONOCYTES NFR BLD AUTO: 6 %
MONOCYTES NFR BLD AUTO: 7 %
MRSA DNA SPEC QL NAA+PROBE: NEGATIVE
MUCOUS THREADS #/AREA URNS LPF: PRESENT /LPF
NEUTROPHILS # BLD AUTO: 15.1 10E3/UL (ref 1.6–8.3)
NEUTROPHILS # BLD AUTO: 16.4 10E3/UL (ref 1.6–8.3)
NEUTROPHILS # BLD AUTO: 16.6 10E3/UL (ref 1.6–8.3)
NEUTROPHILS # BLD AUTO: 16.8 10E3/UL (ref 1.6–8.3)
NEUTROPHILS # BLD AUTO: 16.9 10E3/UL (ref 1.6–8.3)
NEUTROPHILS # BLD AUTO: 18.9 10E3/UL (ref 1.6–8.3)
NEUTROPHILS # BLD AUTO: 20.4 10E3/UL (ref 1.6–8.3)
NEUTROPHILS # BLD AUTO: 21.5 10E3/UL (ref 1.6–8.3)
NEUTROPHILS # BLD AUTO: 22.8 10E3/UL (ref 1.6–8.3)
NEUTROPHILS NFR BLD AUTO: 82 %
NEUTROPHILS NFR BLD AUTO: 82 %
NEUTROPHILS NFR BLD AUTO: 84 %
NEUTROPHILS NFR BLD AUTO: 85 %
NEUTROPHILS NFR BLD AUTO: 85 %
NEUTROPHILS NFR BLD AUTO: 86 %
NEUTROPHILS NFR BLD AUTO: 88 %
NEUTROPHILS NFR BLD AUTO: 89 %
NEUTROPHILS NFR BLD AUTO: 89 %
NITRATE UR QL: NEGATIVE
NRBC # BLD AUTO: 0 10E3/UL
NRBC # BLD AUTO: 0.1 10E3/UL
NRBC # BLD AUTO: 0.1 10E3/UL
NRBC BLD AUTO-RTO: 0 /100
NT-PROBNP SERPL-MCNC: ABNORMAL PG/ML (ref 0–900)
NT-PROBNP SERPL-MCNC: ABNORMAL PG/ML (ref 0–900)
O2/TOTAL GAS SETTING VFR VENT: 35 %
O2/TOTAL GAS SETTING VFR VENT: 35 %
O2/TOTAL GAS SETTING VFR VENT: 40 %
O2/TOTAL GAS SETTING VFR VENT: 50 %
OXYHGB MFR BLDV: 47 % (ref 70–75)
OXYHGB MFR BLDV: 52 % (ref 70–75)
OXYHGB MFR BLDV: 58 % (ref 70–75)
OXYHGB MFR BLDV: 70 % (ref 70–75)
OXYHGB MFR BLDV: 72 % (ref 70–75)
OXYHGB MFR BLDV: 74 % (ref 70–75)
OXYHGB MFR BLDV: 75 % (ref 70–75)
OXYHGB MFR BLDV: 75 % (ref 70–75)
OXYHGB MFR BLDV: 76 % (ref 70–75)
OXYHGB MFR BLDV: 76 % (ref 70–75)
P AXIS - MUSE: 42 DEGREES
P AXIS - MUSE: 44 DEGREES
P AXIS - MUSE: 54 DEGREES
P AXIS - MUSE: 63 DEGREES
P AXIS - MUSE: 75 DEGREES
P AXIS - MUSE: NORMAL DEGREES
PATH REPORT.ADDENDUM SPEC: ABNORMAL
PATH REPORT.COMMENTS IMP SPEC: ABNORMAL
PATH REPORT.COMMENTS IMP SPEC: YES
PATH REPORT.FINAL DX SPEC: ABNORMAL
PATH REPORT.GROSS SPEC: ABNORMAL
PATH REPORT.MICROSCOPIC SPEC OTHER STN: ABNORMAL
PATH REPORT.RELEVANT HX SPEC: ABNORMAL
PATHOLOGY SYNOPTIC REPORT: ABNORMAL
PCO2 BLDV: 48 MM HG (ref 40–50)
PCO2 BLDV: 50 MM HG (ref 40–50)
PCO2 BLDV: 53 MM HG (ref 40–50)
PCO2 BLDV: 54 MM HG (ref 40–50)
PCO2 BLDV: 56 MM HG (ref 40–50)
PCO2 BLDV: 56 MM HG (ref 40–50)
PCO2 BLDV: 57 MM HG (ref 40–50)
PCO2 BLDV: 62 MM HG (ref 40–50)
PH BLDV: 7.17 [PH] (ref 7.32–7.43)
PH BLDV: 7.17 [PH] (ref 7.32–7.43)
PH BLDV: 7.22 [PH] (ref 7.32–7.43)
PH BLDV: 7.23 [PH] (ref 7.32–7.43)
PH BLDV: 7.24 [PH] (ref 7.32–7.43)
PH BLDV: 7.25 [PH] (ref 7.32–7.43)
PH BLDV: 7.29 [PH] (ref 7.32–7.43)
PH BLDV: 7.29 [PH] (ref 7.32–7.43)
PH BLDV: 7.3 [PH] (ref 7.32–7.43)
PH BLDV: 7.31 [PH] (ref 7.32–7.43)
PH UR STRIP: 5.5 [PH] (ref 5–7)
PHOSPHATE SERPL-MCNC: 3.9 MG/DL (ref 2.5–4.5)
PHOTO IMAGE: ABNORMAL
PLATELET # BLD AUTO: 190 10E3/UL (ref 150–450)
PLATELET # BLD AUTO: 224 10E3/UL (ref 150–450)
PLATELET # BLD AUTO: 227 10E3/UL (ref 150–450)
PLATELET # BLD AUTO: 228 10E3/UL (ref 150–450)
PLATELET # BLD AUTO: 234 10E3/UL (ref 150–450)
PLATELET # BLD AUTO: 242 10E3/UL (ref 150–450)
PLATELET # BLD AUTO: 251 10E3/UL (ref 150–450)
PLATELET # BLD AUTO: 257 10E3/UL (ref 150–450)
PLATELET # BLD AUTO: 266 10E3/UL (ref 150–450)
PLATELET # BLD AUTO: 269 10E3/UL (ref 150–450)
PLATELET # BLD AUTO: 272 10E3/UL (ref 150–450)
PLATELET # BLD AUTO: 288 10E3/UL (ref 150–450)
PLATELET # BLD AUTO: 290 10E3/UL (ref 150–450)
PLATELET # BLD AUTO: 302 10E3/UL (ref 150–450)
PO2 BLDV: 30 MM HG (ref 25–47)
PO2 BLDV: 31 MM HG (ref 25–47)
PO2 BLDV: 31 MM HG (ref 25–47)
PO2 BLDV: 40 MM HG (ref 25–47)
PO2 BLDV: 40 MM HG (ref 25–47)
PO2 BLDV: 43 MM HG (ref 25–47)
PO2 BLDV: 43 MM HG (ref 25–47)
PO2 BLDV: 45 MM HG (ref 25–47)
PO2 BLDV: 46 MM HG (ref 25–47)
PO2 BLDV: 46 MM HG (ref 25–47)
POTASSIUM SERPL-SCNC: 3.4 MMOL/L (ref 3.4–5.3)
POTASSIUM SERPL-SCNC: 3.7 MMOL/L (ref 3.4–5.3)
POTASSIUM SERPL-SCNC: 3.8 MMOL/L (ref 3.4–5.3)
POTASSIUM SERPL-SCNC: 3.9 MMOL/L (ref 3.4–5.3)
POTASSIUM SERPL-SCNC: 4 MMOL/L (ref 3.4–5.3)
POTASSIUM SERPL-SCNC: 4.1 MMOL/L (ref 3.4–5.3)
POTASSIUM SERPL-SCNC: 4.4 MMOL/L (ref 3.4–5.3)
POTASSIUM SERPL-SCNC: 4.6 MMOL/L (ref 3.4–5.3)
PR INTERVAL - MUSE: 132 MS
PR INTERVAL - MUSE: 132 MS
PR INTERVAL - MUSE: 146 MS
PR INTERVAL - MUSE: 150 MS
PR INTERVAL - MUSE: 156 MS
PR INTERVAL - MUSE: 162 MS
PROCALCITONIN SERPL IA-MCNC: 0.25 NG/ML
PROCALCITONIN SERPL IA-MCNC: 0.26 NG/ML
PROCALCITONIN SERPL IA-MCNC: 0.36 NG/ML
PROT SERPL-MCNC: 5.2 G/DL (ref 6.4–8.3)
PROT SERPL-MCNC: 5.4 G/DL (ref 6.4–8.3)
PROT SERPL-MCNC: 6 G/DL (ref 6.4–8.3)
QRS DURATION - MUSE: 134 MS
QRS DURATION - MUSE: 138 MS
QRS DURATION - MUSE: 144 MS
QRS DURATION - MUSE: 146 MS
QT - MUSE: 350 MS
QT - MUSE: 382 MS
QT - MUSE: 382 MS
QT - MUSE: 392 MS
QT - MUSE: 398 MS
QT - MUSE: 408 MS
QTC - MUSE: 469 MS
QTC - MUSE: 476 MS
QTC - MUSE: 484 MS
QTC - MUSE: 485 MS
QTC - MUSE: 486 MS
QTC - MUSE: 511 MS
R AXIS - MUSE: -73 DEGREES
R AXIS - MUSE: -74 DEGREES
R AXIS - MUSE: -84 DEGREES
R AXIS - MUSE: 241 DEGREES
R AXIS - MUSE: 254 DEGREES
R AXIS - MUSE: 266 DEGREES
RADIOLOGIST FLAGS: ABNORMAL
RADIOLOGIST FLAGS: ABNORMAL
RBC # BLD AUTO: 2.63 10E6/UL (ref 3.8–5.2)
RBC # BLD AUTO: 2.93 10E6/UL (ref 3.8–5.2)
RBC # BLD AUTO: 3.06 10E6/UL (ref 3.8–5.2)
RBC # BLD AUTO: 3.18 10E6/UL (ref 3.8–5.2)
RBC # BLD AUTO: 3.19 10E6/UL (ref 3.8–5.2)
RBC # BLD AUTO: 3.24 10E6/UL (ref 3.8–5.2)
RBC # BLD AUTO: 3.27 10E6/UL (ref 3.8–5.2)
RBC # BLD AUTO: 3.33 10E6/UL (ref 3.8–5.2)
RBC # BLD AUTO: 3.41 10E6/UL (ref 3.8–5.2)
RBC # BLD AUTO: 3.56 10E6/UL (ref 3.8–5.2)
RBC # BLD AUTO: 3.57 10E6/UL (ref 3.8–5.2)
RBC # BLD AUTO: 3.72 10E6/UL (ref 3.8–5.2)
RBC URINE: 1 /HPF
RSV RNA SPEC QL NAA+PROBE: NOT DETECTED
RV+EV RNA SPEC QL NAA+PROBE: NOT DETECTED
RV+EV RNA SPEC QL NAA+PROBE: NOT DETECTED
SA TARGET DNA: POSITIVE
SAO2 % BLDV: 48.4 % (ref 70–75)
SAO2 % BLDV: 53.1 % (ref 70–75)
SAO2 % BLDV: 59.9 % (ref 70–75)
SAO2 % BLDV: 71.8 % (ref 70–75)
SAO2 % BLDV: 74.5 % (ref 70–75)
SAO2 % BLDV: 76.3 % (ref 70–75)
SAO2 % BLDV: 76.7 % (ref 70–75)
SAO2 % BLDV: 76.9 % (ref 70–75)
SAO2 % BLDV: 77.9 % (ref 70–75)
SAO2 % BLDV: 78.5 % (ref 70–75)
SARS-COV-2 RNA RESP QL NAA+PROBE: NEGATIVE
SODIUM SERPL-SCNC: 138 MMOL/L (ref 135–145)
SODIUM SERPL-SCNC: 139 MMOL/L (ref 135–145)
SODIUM SERPL-SCNC: 140 MMOL/L (ref 135–145)
SODIUM SERPL-SCNC: 140 MMOL/L (ref 135–145)
SODIUM SERPL-SCNC: 141 MMOL/L (ref 135–145)
SODIUM SERPL-SCNC: 141 MMOL/L (ref 135–145)
SODIUM SERPL-SCNC: 142 MMOL/L (ref 135–145)
SODIUM SERPL-SCNC: 146 MMOL/L (ref 135–145)
SP GR UR STRIP: 1.01 (ref 1–1.03)
SPECIMEN DESCRIPTION: NORMAL
SPECIMEN EXPIRATION DATE: NORMAL
SPECIMEN EXPIRATION DATE: NORMAL
SYSTOLIC BLOOD PRESSURE - MUSE: NORMAL MMHG
T AXIS - MUSE: 107 DEGREES
T AXIS - MUSE: 108 DEGREES
T AXIS - MUSE: 126 DEGREES
T AXIS - MUSE: 130 DEGREES
T AXIS - MUSE: 141 DEGREES
T AXIS - MUSE: 89 DEGREES
T4 FREE SERPL-MCNC: 1.15 NG/DL (ref 0.9–1.7)
TRANSITIONAL EPI: <1 /HPF
TROPONIN T SERPL HS-MCNC: 32 NG/L
TROPONIN T SERPL HS-MCNC: 36 NG/L
TROPONIN T SERPL HS-MCNC: 37 NG/L
TROPONIN T SERPL HS-MCNC: 39 NG/L
TSH SERPL DL<=0.005 MIU/L-ACNC: 4.65 UIU/ML (ref 0.3–4.2)
UFH PPP CHRO-ACNC: 0.13 IU/ML
UFH PPP CHRO-ACNC: 0.21 IU/ML
UFH PPP CHRO-ACNC: 0.26 IU/ML
UFH PPP CHRO-ACNC: 0.27 IU/ML
UFH PPP CHRO-ACNC: 0.29 IU/ML
UFH PPP CHRO-ACNC: 0.3 IU/ML
UFH PPP CHRO-ACNC: 0.3 IU/ML
UFH PPP CHRO-ACNC: 0.32 IU/ML
UFH PPP CHRO-ACNC: 0.32 IU/ML
UFH PPP CHRO-ACNC: 0.38 IU/ML
UFH PPP CHRO-ACNC: 0.4 IU/ML
UFH PPP CHRO-ACNC: 0.44 IU/ML
UFH PPP CHRO-ACNC: 0.48 IU/ML
UNIT ABO/RH: NORMAL
UNIT NUMBER: NORMAL
UNIT STATUS: NORMAL
UNIT TYPE ISBT: 6200
UROBILINOGEN UR STRIP-MCNC: NORMAL MG/DL
VANCOMYCIN SERPL-MCNC: 13.1 UG/ML
VENTRICULAR RATE- MUSE: 108 BPM
VENTRICULAR RATE- MUSE: 116 BPM
VENTRICULAR RATE- MUSE: 85 BPM
VENTRICULAR RATE- MUSE: 89 BPM
VENTRICULAR RATE- MUSE: 89 BPM
VENTRICULAR RATE- MUSE: 91 BPM
WBC # BLD AUTO: 17.9 10E3/UL (ref 4–11)
WBC # BLD AUTO: 19.2 10E3/UL (ref 4–11)
WBC # BLD AUTO: 19.8 10E3/UL (ref 4–11)
WBC # BLD AUTO: 20.1 10E3/UL (ref 4–11)
WBC # BLD AUTO: 20.2 10E3/UL (ref 4–11)
WBC # BLD AUTO: 20.4 10E3/UL (ref 4–11)
WBC # BLD AUTO: 20.5 10E3/UL (ref 4–11)
WBC # BLD AUTO: 21.3 10E3/UL (ref 4–11)
WBC # BLD AUTO: 23.3 10E3/UL (ref 4–11)
WBC # BLD AUTO: 23.4 10E3/UL (ref 4–11)
WBC # BLD AUTO: 24.9 10E3/UL (ref 4–11)
WBC # BLD AUTO: 25.7 10E3/UL (ref 4–11)
WBC # BLD AUTO: 25.9 10E3/UL (ref 4–11)
WBC # BLD AUTO: 26.5 10E3/UL (ref 4–11)
WBC URINE: <1 /HPF

## 2024-01-01 PROCEDURE — 99291 CRITICAL CARE FIRST HOUR: CPT | Performed by: PHYSICIAN ASSISTANT

## 2024-01-01 PROCEDURE — 80048 BASIC METABOLIC PNL TOTAL CA: CPT | Performed by: INTERNAL MEDICINE

## 2024-01-01 PROCEDURE — 94660 CPAP INITIATION&MGMT: CPT

## 2024-01-01 PROCEDURE — 97140 MANUAL THERAPY 1/> REGIONS: CPT | Mod: GO

## 2024-01-01 PROCEDURE — 80202 ASSAY OF VANCOMYCIN: CPT | Performed by: INTERNAL MEDICINE

## 2024-01-01 PROCEDURE — 88305 TISSUE EXAM BY PATHOLOGIST: CPT | Mod: 26 | Performed by: PATHOLOGY

## 2024-01-01 PROCEDURE — 88341 IMHCHEM/IMCYTCHM EA ADD ANTB: CPT | Mod: 26 | Performed by: PATHOLOGY

## 2024-01-01 PROCEDURE — C1887 CATHETER, GUIDING: HCPCS

## 2024-01-01 PROCEDURE — 85520 HEPARIN ASSAY: CPT | Performed by: INTERNAL MEDICINE

## 2024-01-01 PROCEDURE — 272N000566 HC SHEATH CR3

## 2024-01-01 PROCEDURE — 999N000185 HC STATISTIC TRANSPORT TIME EA 15 MIN

## 2024-01-01 PROCEDURE — 999N000157 HC STATISTIC RCP TIME EA 10 MIN

## 2024-01-01 PROCEDURE — 85025 COMPLETE CBC W/AUTO DIFF WBC: CPT | Performed by: INTERNAL MEDICINE

## 2024-01-01 PROCEDURE — 82805 BLOOD GASES W/O2 SATURATION: CPT | Performed by: PHYSICIAN ASSISTANT

## 2024-01-01 PROCEDURE — 99232 SBSQ HOSP IP/OBS MODERATE 35: CPT | Mod: GC | Performed by: OBSTETRICS & GYNECOLOGY

## 2024-01-01 PROCEDURE — 99232 SBSQ HOSP IP/OBS MODERATE 35: CPT | Performed by: INTERNAL MEDICINE

## 2024-01-01 PROCEDURE — 250N000011 HC RX IP 250 OP 636: Performed by: EMERGENCY MEDICINE

## 2024-01-01 PROCEDURE — 71045 X-RAY EXAM CHEST 1 VIEW: CPT

## 2024-01-01 PROCEDURE — 85027 COMPLETE CBC AUTOMATED: CPT | Performed by: STUDENT IN AN ORGANIZED HEALTH CARE EDUCATION/TRAINING PROGRAM

## 2024-01-01 PROCEDURE — 97535 SELF CARE MNGMENT TRAINING: CPT | Mod: GO | Performed by: OCCUPATIONAL THERAPIST

## 2024-01-01 PROCEDURE — 250N000011 HC RX IP 250 OP 636: Performed by: PHYSICIAN ASSISTANT

## 2024-01-01 PROCEDURE — 06H03DZ INSERTION OF INTRALUMINAL DEVICE INTO INFERIOR VENA CAVA, PERCUTANEOUS APPROACH: ICD-10-PCS | Performed by: STUDENT IN AN ORGANIZED HEALTH CARE EDUCATION/TRAINING PROGRAM

## 2024-01-01 PROCEDURE — 120N000005 HC R&B MS OVERFLOW UMMC

## 2024-01-01 PROCEDURE — 85520 HEPARIN ASSAY: CPT | Performed by: HOSPITALIST

## 2024-01-01 PROCEDURE — 37244 VASC EMBOLIZE/OCCLUDE BLEED: CPT | Mod: GC | Performed by: STUDENT IN AN ORGANIZED HEALTH CARE EDUCATION/TRAINING PROGRAM

## 2024-01-01 PROCEDURE — 81001 URINALYSIS AUTO W/SCOPE: CPT | Performed by: INTERNAL MEDICINE

## 2024-01-01 PROCEDURE — 86900 BLOOD TYPING SEROLOGIC ABO: CPT | Performed by: INTERNAL MEDICINE

## 2024-01-01 PROCEDURE — 84145 PROCALCITONIN (PCT): CPT | Performed by: PHYSICIAN ASSISTANT

## 2024-01-01 PROCEDURE — 71250 CT THORAX DX C-: CPT | Mod: 26 | Performed by: RADIOLOGY

## 2024-01-01 PROCEDURE — 250N000013 HC RX MED GY IP 250 OP 250 PS 637

## 2024-01-01 PROCEDURE — B41C1ZZ FLUOROSCOPY OF PELVIC ARTERIES USING LOW OSMOLAR CONTRAST: ICD-10-PCS | Performed by: STUDENT IN AN ORGANIZED HEALTH CARE EDUCATION/TRAINING PROGRAM

## 2024-01-01 PROCEDURE — 258N000001 HC RX 258: Performed by: HOSPITALIST

## 2024-01-01 PROCEDURE — 87635 SARS-COV-2 COVID-19 AMP PRB: CPT | Performed by: NURSE PRACTITIONER

## 2024-01-01 PROCEDURE — 85014 HEMATOCRIT: CPT | Performed by: PHYSICIAN ASSISTANT

## 2024-01-01 PROCEDURE — 82805 BLOOD GASES W/O2 SATURATION: CPT | Performed by: NURSE PRACTITIONER

## 2024-01-01 PROCEDURE — 82805 BLOOD GASES W/O2 SATURATION: CPT | Performed by: HOSPITALIST

## 2024-01-01 PROCEDURE — 36415 COLL VENOUS BLD VENIPUNCTURE: CPT

## 2024-01-01 PROCEDURE — 250N000011 HC RX IP 250 OP 636: Performed by: STUDENT IN AN ORGANIZED HEALTH CARE EDUCATION/TRAINING PROGRAM

## 2024-01-01 PROCEDURE — 250N000013 HC RX MED GY IP 250 OP 250 PS 637: Performed by: INTERNAL MEDICINE

## 2024-01-01 PROCEDURE — 37191 INS ENDOVAS VENA CAVA FILTR: CPT

## 2024-01-01 PROCEDURE — 75736 ARTERY X-RAYS PELVIS: CPT | Mod: XS

## 2024-01-01 PROCEDURE — 83605 ASSAY OF LACTIC ACID: CPT | Performed by: PHYSICIAN ASSISTANT

## 2024-01-01 PROCEDURE — 99223 1ST HOSP IP/OBS HIGH 75: CPT | Mod: GC | Performed by: STUDENT IN AN ORGANIZED HEALTH CARE EDUCATION/TRAINING PROGRAM

## 2024-01-01 PROCEDURE — 74177 CT ABD & PELVIS W/CONTRAST: CPT | Mod: 26 | Performed by: RADIOLOGY

## 2024-01-01 PROCEDURE — 86900 BLOOD TYPING SEROLOGIC ABO: CPT | Performed by: EMERGENCY MEDICINE

## 2024-01-01 PROCEDURE — 99223 1ST HOSP IP/OBS HIGH 75: CPT | Performed by: INTERNAL MEDICINE

## 2024-01-01 PROCEDURE — 255N000002 HC RX 255 OP 636: Performed by: PHYSICIAN ASSISTANT

## 2024-01-01 PROCEDURE — 97166 OT EVAL MOD COMPLEX 45 MIN: CPT | Mod: GO

## 2024-01-01 PROCEDURE — 36246 INS CATH ABD/L-EXT ART 2ND: CPT | Mod: GC | Performed by: STUDENT IN AN ORGANIZED HEALTH CARE EDUCATION/TRAINING PROGRAM

## 2024-01-01 PROCEDURE — 85520 HEPARIN ASSAY: CPT

## 2024-01-01 PROCEDURE — 250N000011 HC RX IP 250 OP 636

## 2024-01-01 PROCEDURE — 82010 KETONE BODYS QUAN: CPT | Performed by: HOSPITALIST

## 2024-01-01 PROCEDURE — 93970 EXTREMITY STUDY: CPT

## 2024-01-01 PROCEDURE — 83605 ASSAY OF LACTIC ACID: CPT | Performed by: EMERGENCY MEDICINE

## 2024-01-01 PROCEDURE — 84443 ASSAY THYROID STIM HORMONE: CPT | Performed by: EMERGENCY MEDICINE

## 2024-01-01 PROCEDURE — 80053 COMPREHEN METABOLIC PANEL: CPT | Performed by: EMERGENCY MEDICINE

## 2024-01-01 PROCEDURE — P9016 RBC LEUKOCYTES REDUCED: HCPCS | Performed by: HOSPITALIST

## 2024-01-01 PROCEDURE — P9016 RBC LEUKOCYTES REDUCED: HCPCS | Performed by: INTERNAL MEDICINE

## 2024-01-01 PROCEDURE — C1769 GUIDE WIRE: HCPCS

## 2024-01-01 PROCEDURE — 85018 HEMOGLOBIN: CPT | Performed by: PHYSICIAN ASSISTANT

## 2024-01-01 PROCEDURE — 206N000001 HC R&B BMT UMMC

## 2024-01-01 PROCEDURE — 71045 X-RAY EXAM CHEST 1 VIEW: CPT | Mod: 26 | Performed by: STUDENT IN AN ORGANIZED HEALTH CARE EDUCATION/TRAINING PROGRAM

## 2024-01-01 PROCEDURE — 36245 INS CATH ABD/L-EXT ART 1ST: CPT | Mod: XU | Performed by: STUDENT IN AN ORGANIZED HEALTH CARE EDUCATION/TRAINING PROGRAM

## 2024-01-01 PROCEDURE — 81288 MLH1 GENE: CPT | Performed by: STUDENT IN AN ORGANIZED HEALTH CARE EDUCATION/TRAINING PROGRAM

## 2024-01-01 PROCEDURE — 99207 PR APP CREDIT; MD BILLING SHARED VISIT: CPT | Performed by: NURSE PRACTITIONER

## 2024-01-01 PROCEDURE — 99238 HOSP IP/OBS DSCHRG MGMT 30/<: CPT | Mod: FS | Performed by: STUDENT IN AN ORGANIZED HEALTH CARE EDUCATION/TRAINING PROGRAM

## 2024-01-01 PROCEDURE — 71275 CT ANGIOGRAPHY CHEST: CPT

## 2024-01-01 PROCEDURE — 85610 PROTHROMBIN TIME: CPT | Performed by: INTERNAL MEDICINE

## 2024-01-01 PROCEDURE — S5010 5% DEXTROSE AND 0.45% SALINE: HCPCS | Performed by: PHYSICIAN ASSISTANT

## 2024-01-01 PROCEDURE — 93010 ELECTROCARDIOGRAM REPORT: CPT | Performed by: INTERNAL MEDICINE

## 2024-01-01 PROCEDURE — 250N000011 HC RX IP 250 OP 636: Performed by: INTERNAL MEDICINE

## 2024-01-01 PROCEDURE — 82805 BLOOD GASES W/O2 SATURATION: CPT | Performed by: INTERNAL MEDICINE

## 2024-01-01 PROCEDURE — 83690 ASSAY OF LIPASE: CPT | Performed by: EMERGENCY MEDICINE

## 2024-01-01 PROCEDURE — 258N000001 HC RX 258: Performed by: INTERNAL MEDICINE

## 2024-01-01 PROCEDURE — 84484 ASSAY OF TROPONIN QUANT: CPT | Performed by: EMERGENCY MEDICINE

## 2024-01-01 PROCEDURE — 99291 CRITICAL CARE FIRST HOUR: CPT | Mod: 25 | Performed by: EMERGENCY MEDICINE

## 2024-01-01 PROCEDURE — G0463 HOSPITAL OUTPT CLINIC VISIT: HCPCS | Mod: 25

## 2024-01-01 PROCEDURE — 258N000003 HC RX IP 258 OP 636: Performed by: INTERNAL MEDICINE

## 2024-01-01 PROCEDURE — 99152 MOD SED SAME PHYS/QHP 5/>YRS: CPT | Mod: GC | Performed by: STUDENT IN AN ORGANIZED HEALTH CARE EDUCATION/TRAINING PROGRAM

## 2024-01-01 PROCEDURE — 71046 X-RAY EXAM CHEST 2 VIEWS: CPT

## 2024-01-01 PROCEDURE — P9016 RBC LEUKOCYTES REDUCED: HCPCS | Performed by: PHYSICIAN ASSISTANT

## 2024-01-01 PROCEDURE — 75736 ARTERY X-RAYS PELVIS: CPT | Mod: 26 | Performed by: STUDENT IN AN ORGANIZED HEALTH CARE EDUCATION/TRAINING PROGRAM

## 2024-01-01 PROCEDURE — 250N000011 HC RX IP 250 OP 636: Performed by: NURSE PRACTITIONER

## 2024-01-01 PROCEDURE — 99207 PR APP CREDIT; MD BILLING SHARED VISIT: CPT | Performed by: PHYSICIAN ASSISTANT

## 2024-01-01 PROCEDURE — 258N000001 HC RX 258: Performed by: NURSE PRACTITIONER

## 2024-01-01 PROCEDURE — 37244 VASC EMBOLIZE/OCCLUDE BLEED: CPT

## 2024-01-01 PROCEDURE — 71046 X-RAY EXAM CHEST 2 VIEWS: CPT | Mod: 26 | Performed by: RADIOLOGY

## 2024-01-01 PROCEDURE — C1760 CLOSURE DEV, VASC: HCPCS

## 2024-01-01 PROCEDURE — 71045 X-RAY EXAM CHEST 1 VIEW: CPT | Mod: 26 | Performed by: RADIOLOGY

## 2024-01-01 PROCEDURE — 85520 HEPARIN ASSAY: CPT | Performed by: NURSE PRACTITIONER

## 2024-01-01 PROCEDURE — 258N000003 HC RX IP 258 OP 636: Performed by: PHYSICIAN ASSISTANT

## 2024-01-01 PROCEDURE — 250N000009 HC RX 250: Performed by: PHYSICIAN ASSISTANT

## 2024-01-01 PROCEDURE — 04LF3DU OCCLUSION OF LEFT UTERINE ARTERY WITH INTRALUMINAL DEVICE, PERCUTANEOUS APPROACH: ICD-10-PCS | Performed by: STUDENT IN AN ORGANIZED HEALTH CARE EDUCATION/TRAINING PROGRAM

## 2024-01-01 PROCEDURE — 83735 ASSAY OF MAGNESIUM: CPT | Performed by: EMERGENCY MEDICINE

## 2024-01-01 PROCEDURE — C1889 IMPLANT/INSERT DEVICE, NOC: HCPCS

## 2024-01-01 PROCEDURE — 93005 ELECTROCARDIOGRAM TRACING: CPT | Performed by: EMERGENCY MEDICINE

## 2024-01-01 PROCEDURE — 83735 ASSAY OF MAGNESIUM: CPT | Performed by: PHYSICIAN ASSISTANT

## 2024-01-01 PROCEDURE — 272N000054 HC CANNULA HIGH FLOW, ADULT

## 2024-01-01 PROCEDURE — 87486 CHLMYD PNEUM DNA AMP PROBE: CPT | Performed by: NURSE PRACTITIONER

## 2024-01-01 PROCEDURE — 36415 COLL VENOUS BLD VENIPUNCTURE: CPT | Performed by: EMERGENCY MEDICINE

## 2024-01-01 PROCEDURE — 87040 BLOOD CULTURE FOR BACTERIA: CPT | Performed by: EMERGENCY MEDICINE

## 2024-01-01 PROCEDURE — 86923 COMPATIBILITY TEST ELECTRIC: CPT | Performed by: INTERNAL MEDICINE

## 2024-01-01 PROCEDURE — 88342 IMHCHEM/IMCYTCHM 1ST ANTB: CPT | Mod: 26 | Performed by: PATHOLOGY

## 2024-01-01 PROCEDURE — 85025 COMPLETE CBC W/AUTO DIFF WBC: CPT | Performed by: EMERGENCY MEDICINE

## 2024-01-01 PROCEDURE — 84484 ASSAY OF TROPONIN QUANT: CPT

## 2024-01-01 PROCEDURE — 85379 FIBRIN DEGRADATION QUANT: CPT | Performed by: EMERGENCY MEDICINE

## 2024-01-01 PROCEDURE — 36415 COLL VENOUS BLD VENIPUNCTURE: CPT | Performed by: INTERNAL MEDICINE

## 2024-01-01 PROCEDURE — 99222 1ST HOSP IP/OBS MODERATE 55: CPT | Mod: GC | Performed by: INTERNAL MEDICINE

## 2024-01-01 PROCEDURE — 84100 ASSAY OF PHOSPHORUS: CPT | Performed by: NURSE PRACTITIONER

## 2024-01-01 PROCEDURE — 99233 SBSQ HOSP IP/OBS HIGH 50: CPT | Mod: FS | Performed by: STUDENT IN AN ORGANIZED HEALTH CARE EDUCATION/TRAINING PROGRAM

## 2024-01-01 PROCEDURE — 96365 THER/PROPH/DIAG IV INF INIT: CPT | Performed by: EMERGENCY MEDICINE

## 2024-01-01 PROCEDURE — 96366 THER/PROPH/DIAG IV INF ADDON: CPT | Performed by: EMERGENCY MEDICINE

## 2024-01-01 PROCEDURE — 272N000451 HC KIT SHRLOCK 5FR POWER PICC DOUBLE LUMEN

## 2024-01-01 PROCEDURE — 84145 PROCALCITONIN (PCT): CPT | Performed by: NURSE PRACTITIONER

## 2024-01-01 PROCEDURE — 250N000011 HC RX IP 250 OP 636: Mod: JZ | Performed by: INTERNAL MEDICINE

## 2024-01-01 PROCEDURE — 86923 COMPATIBILITY TEST ELECTRIC: CPT | Performed by: HOSPITALIST

## 2024-01-01 PROCEDURE — 93005 ELECTROCARDIOGRAM TRACING: CPT

## 2024-01-01 PROCEDURE — 99233 SBSQ HOSP IP/OBS HIGH 50: CPT | Mod: GC | Performed by: INTERNAL MEDICINE

## 2024-01-01 PROCEDURE — 83880 ASSAY OF NATRIURETIC PEPTIDE: CPT | Performed by: NURSE PRACTITIONER

## 2024-01-01 PROCEDURE — 70450 CT HEAD/BRAIN W/O DYE: CPT | Mod: 26 | Performed by: RADIOLOGY

## 2024-01-01 PROCEDURE — 83550 IRON BINDING TEST: CPT | Performed by: EMERGENCY MEDICINE

## 2024-01-01 PROCEDURE — 250N000011 HC RX IP 250 OP 636: Mod: JZ

## 2024-01-01 PROCEDURE — 93306 TTE W/DOPPLER COMPLETE: CPT | Mod: 26 | Performed by: INTERNAL MEDICINE

## 2024-01-01 PROCEDURE — 85027 COMPLETE CBC AUTOMATED: CPT

## 2024-01-01 PROCEDURE — 93010 ELECTROCARDIOGRAM REPORT: CPT | Mod: XP | Performed by: INTERNAL MEDICINE

## 2024-01-01 PROCEDURE — 36415 COLL VENOUS BLD VENIPUNCTURE: CPT | Performed by: PHYSICIAN ASSISTANT

## 2024-01-01 PROCEDURE — 93306 TTE W/DOPPLER COMPLETE: CPT

## 2024-01-01 PROCEDURE — 93308 TTE F-UP OR LMTD: CPT | Mod: 26 | Performed by: EMERGENCY MEDICINE

## 2024-01-01 PROCEDURE — 96375 TX/PRO/DX INJ NEW DRUG ADDON: CPT | Performed by: EMERGENCY MEDICINE

## 2024-01-01 PROCEDURE — 99222 1ST HOSP IP/OBS MODERATE 55: CPT | Mod: GC | Performed by: OBSTETRICS & GYNECOLOGY

## 2024-01-01 PROCEDURE — 87641 MR-STAPH DNA AMP PROBE: CPT | Performed by: PHYSICIAN ASSISTANT

## 2024-01-01 PROCEDURE — 85018 HEMOGLOBIN: CPT | Performed by: INTERNAL MEDICINE

## 2024-01-01 PROCEDURE — 272N000504 HC NEEDLE CR4

## 2024-01-01 PROCEDURE — 5A09557 ASSISTANCE WITH RESPIRATORY VENTILATION, GREATER THAN 96 CONSECUTIVE HOURS, CONTINUOUS POSITIVE AIRWAY PRESSURE: ICD-10-PCS | Performed by: PHYSICIAN ASSISTANT

## 2024-01-01 PROCEDURE — 36569 INSJ PICC 5 YR+ W/O IMAGING: CPT

## 2024-01-01 PROCEDURE — 86923 COMPATIBILITY TEST ELECTRIC: CPT | Performed by: EMERGENCY MEDICINE

## 2024-01-01 PROCEDURE — 70450 CT HEAD/BRAIN W/O DYE: CPT

## 2024-01-01 PROCEDURE — 71250 CT THORAX DX C-: CPT

## 2024-01-01 PROCEDURE — 04JY3ZZ INSPECTION OF LOWER ARTERY, PERCUTANEOUS APPROACH: ICD-10-PCS | Performed by: STUDENT IN AN ORGANIZED HEALTH CARE EDUCATION/TRAINING PROGRAM

## 2024-01-01 PROCEDURE — 36430 TRANSFUSION BLD/BLD COMPNT: CPT | Performed by: EMERGENCY MEDICINE

## 2024-01-01 PROCEDURE — 84484 ASSAY OF TROPONIN QUANT: CPT | Performed by: PHYSICIAN ASSISTANT

## 2024-01-01 PROCEDURE — C1880 VENA CAVA FILTER: HCPCS

## 2024-01-01 PROCEDURE — P9016 RBC LEUKOCYTES REDUCED: HCPCS | Performed by: EMERGENCY MEDICINE

## 2024-01-01 PROCEDURE — 86901 BLOOD TYPING SEROLOGIC RH(D): CPT | Performed by: INTERNAL MEDICINE

## 2024-01-01 PROCEDURE — 85520 HEPARIN ASSAY: CPT | Performed by: EMERGENCY MEDICINE

## 2024-01-01 PROCEDURE — 83880 ASSAY OF NATRIURETIC PEPTIDE: CPT | Performed by: EMERGENCY MEDICINE

## 2024-01-01 PROCEDURE — 86923 COMPATIBILITY TEST ELECTRIC: CPT | Performed by: PHYSICIAN ASSISTANT

## 2024-01-01 PROCEDURE — 88360 TUMOR IMMUNOHISTOCHEM/MANUAL: CPT | Mod: 26 | Performed by: PATHOLOGY

## 2024-01-01 PROCEDURE — 82728 ASSAY OF FERRITIN: CPT | Performed by: INTERNAL MEDICINE

## 2024-01-01 PROCEDURE — 250N000011 HC RX IP 250 OP 636: Mod: JZ | Performed by: PHYSICIAN ASSISTANT

## 2024-01-01 PROCEDURE — 74177 CT ABD & PELVIS W/CONTRAST: CPT

## 2024-01-01 PROCEDURE — 86901 BLOOD TYPING SEROLOGIC RH(D): CPT | Performed by: EMERGENCY MEDICINE

## 2024-01-01 PROCEDURE — 80048 BASIC METABOLIC PNL TOTAL CA: CPT | Performed by: PHYSICIAN ASSISTANT

## 2024-01-01 PROCEDURE — 83735 ASSAY OF MAGNESIUM: CPT | Performed by: NURSE PRACTITIONER

## 2024-01-01 PROCEDURE — 0W3R7ZZ CONTROL BLEEDING IN GENITOURINARY TRACT, VIA NATURAL OR ARTIFICIAL OPENING: ICD-10-PCS | Performed by: OBSTETRICS & GYNECOLOGY

## 2024-01-01 PROCEDURE — 87040 BLOOD CULTURE FOR BACTERIA: CPT | Performed by: INTERNAL MEDICINE

## 2024-01-01 PROCEDURE — 37191 INS ENDOVAS VENA CAVA FILTR: CPT | Mod: GC | Performed by: STUDENT IN AN ORGANIZED HEALTH CARE EDUCATION/TRAINING PROGRAM

## 2024-01-01 PROCEDURE — 85004 AUTOMATED DIFF WBC COUNT: CPT | Performed by: INTERNAL MEDICINE

## 2024-01-01 PROCEDURE — 0T9B70Z DRAINAGE OF BLADDER WITH DRAINAGE DEVICE, VIA NATURAL OR ARTIFICIAL OPENING: ICD-10-PCS | Performed by: OBSTETRICS & GYNECOLOGY

## 2024-01-01 PROCEDURE — 88341 IMHCHEM/IMCYTCHM EA ADD ANTB: CPT | Mod: TC | Performed by: STUDENT IN AN ORGANIZED HEALTH CARE EDUCATION/TRAINING PROGRAM

## 2024-01-01 PROCEDURE — 80048 BASIC METABOLIC PNL TOTAL CA: CPT

## 2024-01-01 PROCEDURE — 84439 ASSAY OF FREE THYROXINE: CPT | Performed by: EMERGENCY MEDICINE

## 2024-01-01 PROCEDURE — B519ZZZ FLUOROSCOPY OF INFERIOR VENA CAVA: ICD-10-PCS | Performed by: STUDENT IN AN ORGANIZED HEALTH CARE EDUCATION/TRAINING PROGRAM

## 2024-01-01 PROCEDURE — 93010 ELECTROCARDIOGRAM REPORT: CPT | Mod: 59 | Performed by: EMERGENCY MEDICINE

## 2024-01-01 PROCEDURE — 99152 MOD SED SAME PHYS/QHP 5/>YRS: CPT

## 2024-01-01 PROCEDURE — 99223 1ST HOSP IP/OBS HIGH 75: CPT | Mod: AI | Performed by: INTERNAL MEDICINE

## 2024-01-01 PROCEDURE — 93308 TTE F-UP OR LMTD: CPT | Performed by: EMERGENCY MEDICINE

## 2024-01-01 PROCEDURE — 272N000143 HC KIT CR3

## 2024-01-01 PROCEDURE — 250N000013 HC RX MED GY IP 250 OP 250 PS 637: Performed by: PHYSICIAN ASSISTANT

## 2024-01-01 PROCEDURE — 83605 ASSAY OF LACTIC ACID: CPT | Performed by: HOSPITALIST

## 2024-01-01 PROCEDURE — S5010 5% DEXTROSE AND 0.45% SALINE: HCPCS | Performed by: INTERNAL MEDICINE

## 2024-01-01 PROCEDURE — 999N000127 HC STATISTIC PERIPHERAL IV START W US GUIDANCE

## 2024-01-01 PROCEDURE — 87486 CHLMYD PNEUM DNA AMP PROBE: CPT | Performed by: PHYSICIAN ASSISTANT

## 2024-01-01 PROCEDURE — 71275 CT ANGIOGRAPHY CHEST: CPT | Mod: 26 | Performed by: RADIOLOGY

## 2024-01-01 PROCEDURE — 250N000009 HC RX 250: Performed by: NURSE PRACTITIONER

## 2024-01-01 PROCEDURE — 999N000065 XR CHEST PORT 1 VIEW

## 2024-01-01 PROCEDURE — 93970 EXTREMITY STUDY: CPT | Mod: 26 | Performed by: RADIOLOGY

## 2024-01-01 PROCEDURE — 36247 INS CATH ABD/L-EXT ART 3RD: CPT | Mod: 50

## 2024-01-01 PROCEDURE — G0452 MOLECULAR PATHOLOGY INTERPR: HCPCS | Mod: 26 | Performed by: STUDENT IN AN ORGANIZED HEALTH CARE EDUCATION/TRAINING PROGRAM

## 2024-01-01 RX ORDER — FUROSEMIDE 10 MG/ML
40 INJECTION INTRAMUSCULAR; INTRAVENOUS ONCE
Status: COMPLETED | OUTPATIENT
Start: 2024-01-01 | End: 2024-01-01

## 2024-01-01 RX ORDER — NALOXONE HYDROCHLORIDE 0.4 MG/ML
0.4 INJECTION, SOLUTION INTRAMUSCULAR; INTRAVENOUS; SUBCUTANEOUS
Status: ACTIVE | OUTPATIENT
Start: 2024-01-01 | End: 2024-01-01

## 2024-01-01 RX ORDER — HYDROMORPHONE HYDROCHLORIDE 1 MG/ML
INJECTION, SOLUTION INTRAMUSCULAR; INTRAVENOUS; SUBCUTANEOUS
Status: DISPENSED
Start: 2024-01-01 | End: 2024-01-01

## 2024-01-01 RX ORDER — CEFEPIME HYDROCHLORIDE 2 G/1
2 INJECTION, POWDER, FOR SOLUTION INTRAVENOUS EVERY 12 HOURS
Status: DISCONTINUED | OUTPATIENT
Start: 2024-01-01 | End: 2024-01-01

## 2024-01-01 RX ORDER — POLYETHYLENE GLYCOL 3350 17 G/17G
17 POWDER, FOR SOLUTION ORAL 2 TIMES DAILY PRN
Status: DISCONTINUED | OUTPATIENT
Start: 2024-01-01 | End: 2024-01-01

## 2024-01-01 RX ORDER — MORPHINE SULFATE 2 MG/ML
1-2 INJECTION, SOLUTION INTRAMUSCULAR; INTRAVENOUS
Status: DISCONTINUED | OUTPATIENT
Start: 2024-01-01 | End: 2024-01-01

## 2024-01-01 RX ORDER — NALOXONE HYDROCHLORIDE 0.4 MG/ML
0.1 INJECTION, SOLUTION INTRAMUSCULAR; INTRAVENOUS; SUBCUTANEOUS
Status: DISCONTINUED | OUTPATIENT
Start: 2024-01-01 | End: 2024-01-01 | Stop reason: HOSPADM

## 2024-01-01 RX ORDER — FUROSEMIDE 10 MG/ML
20 INJECTION INTRAMUSCULAR; INTRAVENOUS ONCE
Status: DISCONTINUED | OUTPATIENT
Start: 2024-01-01 | End: 2024-01-01

## 2024-01-01 RX ORDER — DEXAMETHASONE SODIUM PHOSPHATE 4 MG/ML
4 INJECTION, SOLUTION INTRA-ARTICULAR; INTRALESIONAL; INTRAMUSCULAR; INTRAVENOUS; SOFT TISSUE ONCE
Status: COMPLETED | OUTPATIENT
Start: 2024-01-01 | End: 2024-01-01

## 2024-01-01 RX ORDER — ONDANSETRON 2 MG/ML
4 INJECTION INTRAMUSCULAR; INTRAVENOUS
Status: DISCONTINUED | OUTPATIENT
Start: 2024-01-01 | End: 2024-01-01

## 2024-01-01 RX ORDER — HEPARIN SODIUM,PORCINE 10 UNIT/ML
5-20 VIAL (ML) INTRAVENOUS EVERY 24 HOURS
Status: DISCONTINUED | OUTPATIENT
Start: 2024-01-01 | End: 2024-01-01

## 2024-01-01 RX ORDER — CEFEPIME HYDROCHLORIDE 2 G/1
2 INJECTION, POWDER, FOR SOLUTION INTRAVENOUS EVERY 8 HOURS
Status: DISCONTINUED | OUTPATIENT
Start: 2024-01-01 | End: 2024-01-01

## 2024-01-01 RX ORDER — NICOTINE POLACRILEX 4 MG
15-30 LOZENGE BUCCAL
Status: DISCONTINUED | OUTPATIENT
Start: 2024-01-01 | End: 2024-01-01 | Stop reason: HOSPADM

## 2024-01-01 RX ORDER — FENTANYL CITRATE 50 UG/ML
INJECTION, SOLUTION INTRAMUSCULAR; INTRAVENOUS
Status: COMPLETED
Start: 2024-01-01 | End: 2024-01-01

## 2024-01-01 RX ORDER — OXYCODONE HYDROCHLORIDE 5 MG/1
5 TABLET ORAL EVERY 4 HOURS PRN
Status: DISCONTINUED | OUTPATIENT
Start: 2024-01-01 | End: 2024-01-01 | Stop reason: HOSPADM

## 2024-01-01 RX ORDER — OXYCODONE HYDROCHLORIDE 5 MG/1
5 TABLET ORAL EVERY 4 HOURS PRN
Status: DISCONTINUED | OUTPATIENT
Start: 2024-01-01 | End: 2024-01-01

## 2024-01-01 RX ORDER — MORPHINE SULFATE 2 MG/ML
1 INJECTION, SOLUTION INTRAMUSCULAR; INTRAVENOUS ONCE
Status: COMPLETED | OUTPATIENT
Start: 2024-01-01 | End: 2024-01-01

## 2024-01-01 RX ORDER — IOPAMIDOL 755 MG/ML
135 INJECTION, SOLUTION INTRAVASCULAR ONCE
Status: COMPLETED | OUTPATIENT
Start: 2024-01-01 | End: 2024-01-01

## 2024-01-01 RX ORDER — CLINDAMYCIN PHOSPHATE 900 MG/50ML
900 INJECTION, SOLUTION INTRAVENOUS
Status: COMPLETED | OUTPATIENT
Start: 2024-01-01 | End: 2024-01-01

## 2024-01-01 RX ORDER — DEXTROSE MONOHYDRATE AND SODIUM CHLORIDE 5; .45 G/100ML; G/100ML
INJECTION, SOLUTION INTRAVENOUS CONTINUOUS
Status: DISCONTINUED | OUTPATIENT
Start: 2024-01-01 | End: 2024-01-01

## 2024-01-01 RX ORDER — HYDROMORPHONE HYDROCHLORIDE 1 MG/ML
INJECTION, SOLUTION INTRAMUSCULAR; INTRAVENOUS; SUBCUTANEOUS
Status: COMPLETED
Start: 2024-01-01 | End: 2024-01-01

## 2024-01-01 RX ORDER — ACETAMINOPHEN 325 MG/1
650 TABLET ORAL EVERY 4 HOURS PRN
Status: DISCONTINUED | OUTPATIENT
Start: 2024-01-01 | End: 2024-01-01 | Stop reason: HOSPADM

## 2024-01-01 RX ORDER — DEXTROSE MONOHYDRATE 50 MG/ML
INJECTION, SOLUTION INTRAVENOUS
Status: COMPLETED
Start: 2024-01-01 | End: 2024-01-01

## 2024-01-01 RX ORDER — ATROPINE SULFATE 10 MG/ML
2 SOLUTION/ DROPS OPHTHALMIC EVERY 4 HOURS PRN
Status: DISCONTINUED | OUTPATIENT
Start: 2024-01-01 | End: 2024-01-01 | Stop reason: HOSPADM

## 2024-01-01 RX ORDER — LIDOCAINE 40 MG/G
CREAM TOPICAL
Status: ACTIVE | OUTPATIENT
Start: 2024-01-01 | End: 2024-01-01

## 2024-01-01 RX ORDER — AMOXICILLIN 250 MG
2 CAPSULE ORAL 2 TIMES DAILY PRN
Status: DISCONTINUED | OUTPATIENT
Start: 2024-01-01 | End: 2024-01-01

## 2024-01-01 RX ORDER — HYDROMORPHONE HYDROCHLORIDE 1 MG/ML
0.3 INJECTION, SOLUTION INTRAMUSCULAR; INTRAVENOUS; SUBCUTANEOUS
Status: DISCONTINUED | OUTPATIENT
Start: 2024-01-01 | End: 2024-01-01 | Stop reason: HOSPADM

## 2024-01-01 RX ORDER — NALOXONE HYDROCHLORIDE 0.4 MG/ML
0.2 INJECTION, SOLUTION INTRAMUSCULAR; INTRAVENOUS; SUBCUTANEOUS
Status: DISCONTINUED | OUTPATIENT
Start: 2024-01-01 | End: 2024-01-01

## 2024-01-01 RX ORDER — HEPARIN SODIUM,PORCINE 10 UNIT/ML
5-20 VIAL (ML) INTRAVENOUS
Status: DISCONTINUED | OUTPATIENT
Start: 2024-01-01 | End: 2024-01-01 | Stop reason: HOSPADM

## 2024-01-01 RX ORDER — VANCOMYCIN HYDROCHLORIDE
1500 EVERY 24 HOURS
Status: DISCONTINUED | OUTPATIENT
Start: 2024-01-01 | End: 2024-01-01

## 2024-01-01 RX ORDER — FENTANYL CITRATE 50 UG/ML
50 INJECTION, SOLUTION INTRAMUSCULAR; INTRAVENOUS ONCE
Status: COMPLETED | OUTPATIENT
Start: 2024-01-01 | End: 2024-01-01

## 2024-01-01 RX ORDER — SENNOSIDES 8.6 MG
1 TABLET ORAL 2 TIMES DAILY PRN
Status: DISCONTINUED | OUTPATIENT
Start: 2024-01-01 | End: 2024-01-01 | Stop reason: HOSPADM

## 2024-01-01 RX ORDER — CHLOROTHIAZIDE SODIUM 500 MG/1
500 INJECTION INTRAVENOUS ONCE
Status: COMPLETED | OUTPATIENT
Start: 2024-01-01 | End: 2024-01-01

## 2024-01-01 RX ORDER — ONDANSETRON 2 MG/ML
4 INJECTION INTRAMUSCULAR; INTRAVENOUS EVERY 6 HOURS PRN
Status: DISCONTINUED | OUTPATIENT
Start: 2024-01-01 | End: 2024-01-01

## 2024-01-01 RX ORDER — LIDOCAINE 40 MG/G
CREAM TOPICAL
Status: DISCONTINUED | OUTPATIENT
Start: 2024-01-01 | End: 2024-01-01 | Stop reason: HOSPADM

## 2024-01-01 RX ORDER — MORPHINE SULFATE 2 MG/ML
1-2 INJECTION, SOLUTION INTRAMUSCULAR; INTRAVENOUS
Status: COMPLETED | OUTPATIENT
Start: 2024-01-01 | End: 2024-01-01

## 2024-01-01 RX ORDER — MORPHINE SULFATE 2 MG/ML
1 INJECTION, SOLUTION INTRAMUSCULAR; INTRAVENOUS
Status: DISCONTINUED | OUTPATIENT
Start: 2024-01-01 | End: 2024-01-01

## 2024-01-01 RX ORDER — PIPERACILLIN SODIUM, TAZOBACTAM SODIUM 4; .5 G/20ML; G/20ML
4.5 INJECTION, POWDER, LYOPHILIZED, FOR SOLUTION INTRAVENOUS EVERY 6 HOURS
Status: DISCONTINUED | OUTPATIENT
Start: 2024-01-01 | End: 2024-01-01

## 2024-01-01 RX ORDER — NALOXONE HYDROCHLORIDE 0.4 MG/ML
0.2 INJECTION, SOLUTION INTRAMUSCULAR; INTRAVENOUS; SUBCUTANEOUS
Status: ACTIVE | OUTPATIENT
Start: 2024-01-01 | End: 2024-01-01

## 2024-01-01 RX ORDER — MORPHINE SULFATE 4 MG/ML
4 INJECTION, SOLUTION INTRAMUSCULAR; INTRAVENOUS ONCE
Status: COMPLETED | OUTPATIENT
Start: 2024-01-01 | End: 2024-01-01

## 2024-01-01 RX ORDER — NALOXONE HYDROCHLORIDE 0.4 MG/ML
0.4 INJECTION, SOLUTION INTRAMUSCULAR; INTRAVENOUS; SUBCUTANEOUS
Status: DISCONTINUED | OUTPATIENT
Start: 2024-01-01 | End: 2024-01-01

## 2024-01-01 RX ORDER — ONDANSETRON 2 MG/ML
4 INJECTION INTRAMUSCULAR; INTRAVENOUS EVERY 6 HOURS PRN
Status: DISCONTINUED | OUTPATIENT
Start: 2024-01-01 | End: 2024-01-01 | Stop reason: HOSPADM

## 2024-01-01 RX ORDER — FENTANYL CITRATE 50 UG/ML
25-50 INJECTION, SOLUTION INTRAMUSCULAR; INTRAVENOUS EVERY 5 MIN PRN
Status: DISCONTINUED | OUTPATIENT
Start: 2024-01-01 | End: 2024-01-01

## 2024-01-01 RX ORDER — OXYCODONE HYDROCHLORIDE 10 MG/1
10 TABLET ORAL EVERY 4 HOURS PRN
Status: DISCONTINUED | OUTPATIENT
Start: 2024-01-01 | End: 2024-01-01

## 2024-01-01 RX ORDER — FUROSEMIDE 10 MG/ML
20 INJECTION INTRAMUSCULAR; INTRAVENOUS ONCE
Status: COMPLETED | OUTPATIENT
Start: 2024-01-01 | End: 2024-01-01

## 2024-01-01 RX ORDER — IODIXANOL 320 MG/ML
150 INJECTION, SOLUTION INTRAVASCULAR ONCE
Status: COMPLETED | OUTPATIENT
Start: 2024-01-01 | End: 2024-01-01

## 2024-01-01 RX ORDER — MINERAL OIL/HYDROPHIL PETROLAT
OINTMENT (GRAM) TOPICAL
Status: DISCONTINUED | OUTPATIENT
Start: 2024-01-01 | End: 2024-01-01 | Stop reason: HOSPADM

## 2024-01-01 RX ORDER — MORPHINE SULFATE 2 MG/ML
2 INJECTION, SOLUTION INTRAMUSCULAR; INTRAVENOUS ONCE
Status: COMPLETED | OUTPATIENT
Start: 2024-01-01 | End: 2024-01-01

## 2024-01-01 RX ORDER — HEPARIN SODIUM 10000 [USP'U]/100ML
0-5000 INJECTION, SOLUTION INTRAVENOUS CONTINUOUS
Status: DISCONTINUED | OUTPATIENT
Start: 2024-01-01 | End: 2024-01-01

## 2024-01-01 RX ORDER — AMOXICILLIN 250 MG
1 CAPSULE ORAL 2 TIMES DAILY PRN
Status: DISCONTINUED | OUTPATIENT
Start: 2024-01-01 | End: 2024-01-01

## 2024-01-01 RX ORDER — NALOXONE HYDROCHLORIDE 0.4 MG/ML
0.2 INJECTION, SOLUTION INTRAMUSCULAR; INTRAVENOUS; SUBCUTANEOUS
Status: DISCONTINUED | OUTPATIENT
Start: 2024-01-01 | End: 2024-01-01 | Stop reason: HOSPADM

## 2024-01-01 RX ORDER — DEXTROSE MONOHYDRATE 25 G/50ML
25-50 INJECTION, SOLUTION INTRAVENOUS
Status: DISCONTINUED | OUTPATIENT
Start: 2024-01-01 | End: 2024-01-01 | Stop reason: HOSPADM

## 2024-01-01 RX ORDER — CALCIUM CARBONATE 500 MG/1
1000 TABLET, CHEWABLE ORAL 4 TIMES DAILY PRN
Status: DISCONTINUED | OUTPATIENT
Start: 2024-01-01 | End: 2024-01-01

## 2024-01-01 RX ORDER — HYDROMORPHONE HCL IN WATER/PF 6 MG/30 ML
0.2 PATIENT CONTROLLED ANALGESIA SYRINGE INTRAVENOUS
Status: DISCONTINUED | OUTPATIENT
Start: 2024-01-01 | End: 2024-01-01

## 2024-01-01 RX ORDER — GLYCOPYRROLATE 0.2 MG/ML
0.2 INJECTION, SOLUTION INTRAMUSCULAR; INTRAVENOUS EVERY 4 HOURS PRN
Status: DISCONTINUED | OUTPATIENT
Start: 2024-01-01 | End: 2024-01-01 | Stop reason: HOSPADM

## 2024-01-01 RX ORDER — LIDOCAINE 4 G/G
1 PATCH TOPICAL
Status: DISCONTINUED | OUTPATIENT
Start: 2024-01-01 | End: 2024-01-01 | Stop reason: HOSPADM

## 2024-01-01 RX ORDER — FLUMAZENIL 0.1 MG/ML
0.2 INJECTION, SOLUTION INTRAVENOUS
Status: ACTIVE | OUTPATIENT
Start: 2024-01-01 | End: 2024-01-01

## 2024-01-01 RX ORDER — CARBOXYMETHYLCELLULOSE SODIUM 5 MG/ML
1-2 SOLUTION/ DROPS OPHTHALMIC
Status: DISCONTINUED | OUTPATIENT
Start: 2024-01-01 | End: 2024-01-01 | Stop reason: HOSPADM

## 2024-01-01 RX ORDER — KETOROLAC TROMETHAMINE 30 MG/ML
15 INJECTION, SOLUTION INTRAMUSCULAR; INTRAVENOUS ONCE
Status: COMPLETED | OUTPATIENT
Start: 2024-01-01 | End: 2024-01-01

## 2024-01-01 RX ORDER — HYDROMORPHONE HYDROCHLORIDE 1 MG/ML
0.5 INJECTION, SOLUTION INTRAMUSCULAR; INTRAVENOUS; SUBCUTANEOUS
Status: DISCONTINUED | OUTPATIENT
Start: 2024-01-01 | End: 2024-01-01 | Stop reason: HOSPADM

## 2024-01-01 RX ORDER — ONDANSETRON 4 MG/1
4 TABLET, ORALLY DISINTEGRATING ORAL EVERY 6 HOURS PRN
Status: DISCONTINUED | OUTPATIENT
Start: 2024-01-01 | End: 2024-01-01

## 2024-01-01 RX ORDER — MULTIPLE VITAMINS W/ MINERALS TAB 9MG-400MCG
1 TAB ORAL DAILY
Status: DISCONTINUED | OUTPATIENT
Start: 2024-01-01 | End: 2024-01-01

## 2024-01-01 RX ORDER — DEXTROSE MONOHYDRATE 100 MG/ML
INJECTION, SOLUTION INTRAVENOUS CONTINUOUS
Status: DISCONTINUED | OUTPATIENT
Start: 2024-01-01 | End: 2024-01-01

## 2024-01-01 RX ORDER — ACETAMINOPHEN 650 MG/1
650 SUPPOSITORY RECTAL EVERY 4 HOURS PRN
Status: DISCONTINUED | OUTPATIENT
Start: 2024-01-01 | End: 2024-01-01 | Stop reason: HOSPADM

## 2024-01-01 RX ORDER — HEPARIN SODIUM 200 [USP'U]/100ML
2 INJECTION, SOLUTION INTRAVENOUS EVERY 5 MIN PRN
Status: DISCONTINUED | OUTPATIENT
Start: 2024-01-01 | End: 2024-01-01

## 2024-01-01 RX ORDER — GLYCOPYRROLATE 0.2 MG/ML
INJECTION, SOLUTION INTRAMUSCULAR; INTRAVENOUS
Status: COMPLETED
Start: 2024-01-01 | End: 2024-01-01

## 2024-01-01 RX ORDER — HALOPERIDOL 5 MG/ML
2 INJECTION INTRAMUSCULAR EVERY 6 HOURS PRN
Status: DISCONTINUED | OUTPATIENT
Start: 2024-01-01 | End: 2024-01-01

## 2024-01-01 RX ORDER — HALOPERIDOL 5 MG/ML
1-2 INJECTION INTRAMUSCULAR EVERY 6 HOURS PRN
Status: DISCONTINUED | OUTPATIENT
Start: 2024-01-01 | End: 2024-01-01 | Stop reason: HOSPADM

## 2024-01-01 RX ORDER — ONDANSETRON 4 MG/1
4 TABLET, ORALLY DISINTEGRATING ORAL EVERY 6 HOURS PRN
Status: DISCONTINUED | OUTPATIENT
Start: 2024-01-01 | End: 2024-01-01 | Stop reason: HOSPADM

## 2024-01-01 RX ORDER — BISACODYL 10 MG
10 SUPPOSITORY, RECTAL RECTAL
Status: DISCONTINUED | OUTPATIENT
Start: 2024-09-14 | End: 2024-01-01 | Stop reason: HOSPADM

## 2024-01-01 RX ADMIN — Medication: at 02:58

## 2024-01-01 RX ADMIN — LIDOCAINE HYDROCHLORIDE 7 ML: 10 INJECTION, SOLUTION EPIDURAL; INFILTRATION; INTRACAUDAL; PERINEURAL at 14:22

## 2024-01-01 RX ADMIN — LIDOCAINE 1 PATCH: 4 PATCH TOPICAL at 20:56

## 2024-01-01 RX ADMIN — PIPERACILLIN AND TAZOBACTAM 4.5 G: 4; .5 INJECTION, POWDER, LYOPHILIZED, FOR SOLUTION INTRAVENOUS at 16:15

## 2024-01-01 RX ADMIN — HYDROMORPHONE HYDROCHLORIDE 0.2 MG: 0.2 INJECTION, SOLUTION INTRAMUSCULAR; INTRAVENOUS; SUBCUTANEOUS at 17:11

## 2024-01-01 RX ADMIN — HEPARIN SODIUM 1950 UNITS/HR: 10000 INJECTION, SOLUTION INTRAVENOUS at 09:06

## 2024-01-01 RX ADMIN — HYDROMORPHONE HYDROCHLORIDE 0.2 MG: 0.2 INJECTION, SOLUTION INTRAMUSCULAR; INTRAVENOUS; SUBCUTANEOUS at 01:39

## 2024-01-01 RX ADMIN — FENTANYL CITRATE 25 MCG: 50 INJECTION, SOLUTION INTRAMUSCULAR; INTRAVENOUS at 14:46

## 2024-01-01 RX ADMIN — HYDROMORPHONE HYDROCHLORIDE 0.5 MG: 1 INJECTION, SOLUTION INTRAMUSCULAR; INTRAVENOUS; SUBCUTANEOUS at 05:33

## 2024-01-01 RX ADMIN — MORPHINE SULFATE 1 MG: 2 INJECTION, SOLUTION INTRAMUSCULAR; INTRAVENOUS at 19:00

## 2024-01-01 RX ADMIN — HEPARIN SODIUM 2 BAG: 200 INJECTION, SOLUTION INTRAVENOUS at 14:23

## 2024-01-01 RX ADMIN — LIDOCAINE 1 PATCH: 4 PATCH TOPICAL at 20:38

## 2024-01-01 RX ADMIN — CEFEPIME HYDROCHLORIDE 2 G: 2 INJECTION, POWDER, FOR SOLUTION INTRAVENOUS at 00:54

## 2024-01-01 RX ADMIN — FENTANYL CITRATE 25 MCG: 50 INJECTION, SOLUTION INTRAMUSCULAR; INTRAVENOUS at 14:20

## 2024-01-01 RX ADMIN — ATROPINE SULFATE 2 DROP: 10 SOLUTION/ DROPS OPHTHALMIC at 08:19

## 2024-01-01 RX ADMIN — HYDROMORPHONE HYDROCHLORIDE 0.3 MG: 1 INJECTION, SOLUTION INTRAMUSCULAR; INTRAVENOUS; SUBCUTANEOUS at 15:53

## 2024-01-01 RX ADMIN — HEPARIN SODIUM 2100 UNITS/HR: 10000 INJECTION, SOLUTION INTRAVENOUS at 12:14

## 2024-01-01 RX ADMIN — POLYETHYLENE GLYCOL 3350 17 G: 17 POWDER, FOR SOLUTION ORAL at 14:51

## 2024-01-01 RX ADMIN — CEFEPIME HYDROCHLORIDE 2 G: 2 INJECTION, POWDER, FOR SOLUTION INTRAVENOUS at 17:39

## 2024-01-01 RX ADMIN — IODIXANOL 250 ML: 320 INJECTION, SOLUTION INTRAVASCULAR at 16:43

## 2024-01-01 RX ADMIN — MORPHINE SULFATE 1 MG: 2 INJECTION, SOLUTION INTRAMUSCULAR; INTRAVENOUS at 16:34

## 2024-01-01 RX ADMIN — MIDAZOLAM 0.5 MG: 1 INJECTION INTRAMUSCULAR; INTRAVENOUS at 15:28

## 2024-01-01 RX ADMIN — FENTANYL CITRATE 50 MCG: 50 INJECTION, SOLUTION INTRAMUSCULAR; INTRAVENOUS at 09:49

## 2024-01-01 RX ADMIN — MORPHINE SULFATE 1 MG: 2 INJECTION, SOLUTION INTRAMUSCULAR; INTRAVENOUS at 12:52

## 2024-01-01 RX ADMIN — KETOROLAC TROMETHAMINE 15 MG: 30 INJECTION, SOLUTION INTRAMUSCULAR at 14:58

## 2024-01-01 RX ADMIN — MORPHINE SULFATE 2 MG: 2 INJECTION, SOLUTION INTRAMUSCULAR; INTRAVENOUS at 04:54

## 2024-01-01 RX ADMIN — MORPHINE SULFATE 1 MG: 2 INJECTION, SOLUTION INTRAMUSCULAR; INTRAVENOUS at 07:58

## 2024-01-01 RX ADMIN — PIPERACILLIN AND TAZOBACTAM 4.5 G: 4; .5 INJECTION, POWDER, LYOPHILIZED, FOR SOLUTION INTRAVENOUS at 09:19

## 2024-01-01 RX ADMIN — MORPHINE SULFATE 1 MG: 2 INJECTION, SOLUTION INTRAMUSCULAR; INTRAVENOUS at 05:17

## 2024-01-01 RX ADMIN — MORPHINE SULFATE 2 MG: 2 INJECTION, SOLUTION INTRAMUSCULAR; INTRAVENOUS at 13:59

## 2024-01-01 RX ADMIN — DEXTROSE MONOHYDRATE: 100 INJECTION, SOLUTION INTRAVENOUS at 10:03

## 2024-01-01 RX ADMIN — CEFEPIME HYDROCHLORIDE 2 G: 2 INJECTION, POWDER, FOR SOLUTION INTRAVENOUS at 16:33

## 2024-01-01 RX ADMIN — ACETAMINOPHEN 650 MG: 650 SUPPOSITORY RECTAL at 21:26

## 2024-01-01 RX ADMIN — HEPARIN SODIUM 2250 UNITS/HR: 10000 INJECTION, SOLUTION INTRAVENOUS at 12:37

## 2024-01-01 RX ADMIN — OXYCODONE HYDROCHLORIDE 10 MG: 10 TABLET ORAL at 09:46

## 2024-01-01 RX ADMIN — MORPHINE SULFATE 1 MG: 2 INJECTION, SOLUTION INTRAMUSCULAR; INTRAVENOUS at 21:36

## 2024-01-01 RX ADMIN — GLYCOPYRROLATE 0.2 MG: 0.2 INJECTION, SOLUTION INTRAMUSCULAR; INTRAVENOUS at 05:33

## 2024-01-01 RX ADMIN — MORPHINE SULFATE 2 MG: 2 INJECTION, SOLUTION INTRAMUSCULAR; INTRAVENOUS at 08:39

## 2024-01-01 RX ADMIN — HYDROMORPHONE HYDROCHLORIDE 0.2 MG: 0.2 INJECTION, SOLUTION INTRAMUSCULAR; INTRAVENOUS; SUBCUTANEOUS at 22:40

## 2024-01-01 RX ADMIN — MORPHINE SULFATE 2 MG: 2 INJECTION, SOLUTION INTRAMUSCULAR; INTRAVENOUS at 08:55

## 2024-01-01 RX ADMIN — HYDROMORPHONE HYDROCHLORIDE 0.2 MG: 0.2 INJECTION, SOLUTION INTRAMUSCULAR; INTRAVENOUS; SUBCUTANEOUS at 13:38

## 2024-01-01 RX ADMIN — LIDOCAINE 1 PATCH: 4 PATCH TOPICAL at 19:36

## 2024-01-01 RX ADMIN — CEFEPIME HYDROCHLORIDE 2 G: 2 INJECTION, POWDER, FOR SOLUTION INTRAVENOUS at 10:09

## 2024-01-01 RX ADMIN — HEPARIN SODIUM 2250 UNITS/HR: 10000 INJECTION, SOLUTION INTRAVENOUS at 21:45

## 2024-01-01 RX ADMIN — DEXTROSE MONOHYDRATE 25 ML: 25 INJECTION, SOLUTION INTRAVENOUS at 10:02

## 2024-01-01 RX ADMIN — LIDOCAINE 1 PATCH: 4 PATCH TOPICAL at 20:09

## 2024-01-01 RX ADMIN — HYDROMORPHONE HYDROCHLORIDE 0.2 MG: 0.2 INJECTION, SOLUTION INTRAMUSCULAR; INTRAVENOUS; SUBCUTANEOUS at 19:59

## 2024-01-01 RX ADMIN — HYDROMORPHONE HYDROCHLORIDE 0.5 MG: 1 INJECTION, SOLUTION INTRAMUSCULAR; INTRAVENOUS; SUBCUTANEOUS at 18:11

## 2024-01-01 RX ADMIN — HYDROMORPHONE HYDROCHLORIDE 0.5 MG: 1 INJECTION, SOLUTION INTRAMUSCULAR; INTRAVENOUS; SUBCUTANEOUS at 23:44

## 2024-01-01 RX ADMIN — SODIUM CHLORIDE, PRESERVATIVE FREE 500 ML: 5 INJECTION INTRAVENOUS at 04:39

## 2024-01-01 RX ADMIN — MORPHINE SULFATE 1 MG: 2 INJECTION, SOLUTION INTRAMUSCULAR; INTRAVENOUS at 00:05

## 2024-01-01 RX ADMIN — HYDROMORPHONE HYDROCHLORIDE 0.5 MG: 1 INJECTION, SOLUTION INTRAMUSCULAR; INTRAVENOUS; SUBCUTANEOUS at 11:21

## 2024-01-01 RX ADMIN — CEFEPIME HYDROCHLORIDE 2 G: 2 INJECTION, POWDER, FOR SOLUTION INTRAVENOUS at 16:55

## 2024-01-01 RX ADMIN — FENTANYL CITRATE 25 MCG: 50 INJECTION, SOLUTION INTRAMUSCULAR; INTRAVENOUS at 14:33

## 2024-01-01 RX ADMIN — MORPHINE SULFATE 1 MG: 2 INJECTION, SOLUTION INTRAMUSCULAR; INTRAVENOUS at 21:10

## 2024-01-01 RX ADMIN — LIDOCAINE 1 PATCH: 4 PATCH TOPICAL at 20:19

## 2024-01-01 RX ADMIN — MIDAZOLAM 1 MG: 1 INJECTION INTRAMUSCULAR; INTRAVENOUS at 14:00

## 2024-01-01 RX ADMIN — MORPHINE SULFATE 2 MG: 2 INJECTION, SOLUTION INTRAMUSCULAR; INTRAVENOUS at 08:08

## 2024-01-01 RX ADMIN — CLINDAMYCIN IN 5 PERCENT DEXTROSE 900 MG: 18 INJECTION, SOLUTION INTRAVENOUS at 14:03

## 2024-01-01 RX ADMIN — DEXTROSE MONOHYDRATE: 100 INJECTION, SOLUTION INTRAVENOUS at 12:41

## 2024-01-01 RX ADMIN — FENTANYL CITRATE 50 MCG: 50 INJECTION, SOLUTION INTRAMUSCULAR; INTRAVENOUS at 14:00

## 2024-01-01 RX ADMIN — DEXTROSE MONOHYDRATE: 100 INJECTION, SOLUTION INTRAVENOUS at 04:43

## 2024-01-01 RX ADMIN — MORPHINE SULFATE 2 MG: 2 INJECTION, SOLUTION INTRAMUSCULAR; INTRAVENOUS at 05:07

## 2024-01-01 RX ADMIN — MORPHINE SULFATE 2 MG: 2 INJECTION, SOLUTION INTRAMUSCULAR; INTRAVENOUS at 20:16

## 2024-01-01 RX ADMIN — CEFEPIME HYDROCHLORIDE 2 G: 2 INJECTION, POWDER, FOR SOLUTION INTRAVENOUS at 08:30

## 2024-01-01 RX ADMIN — DEXTROSE AND SODIUM CHLORIDE: 5; 450 INJECTION, SOLUTION INTRAVENOUS at 10:08

## 2024-01-01 RX ADMIN — MORPHINE SULFATE 4 MG: 4 INJECTION INTRAVENOUS at 20:40

## 2024-01-01 RX ADMIN — MORPHINE SULFATE 1 MG: 2 INJECTION, SOLUTION INTRAMUSCULAR; INTRAVENOUS at 08:34

## 2024-01-01 RX ADMIN — HYDROMORPHONE HYDROCHLORIDE 0.5 MG: 1 INJECTION, SOLUTION INTRAMUSCULAR; INTRAVENOUS; SUBCUTANEOUS at 01:47

## 2024-01-01 RX ADMIN — DEXTROSE MONOHYDRATE 25 ML: 25 INJECTION, SOLUTION INTRAVENOUS at 07:58

## 2024-01-01 RX ADMIN — IOPAMIDOL 135 ML: 755 INJECTION, SOLUTION INTRAVENOUS at 20:03

## 2024-01-01 RX ADMIN — GLYCOPYRROLATE 0.2 MG: 0.2 INJECTION, SOLUTION INTRAMUSCULAR; INTRAVENOUS at 18:20

## 2024-01-01 RX ADMIN — HYDROMORPHONE HYDROCHLORIDE 0.5 MG: 1 INJECTION, SOLUTION INTRAMUSCULAR; INTRAVENOUS; SUBCUTANEOUS at 22:26

## 2024-01-01 RX ADMIN — OXYCODONE HYDROCHLORIDE 5 MG: 5 TABLET ORAL at 14:15

## 2024-01-01 RX ADMIN — DEXTROSE MONOHYDRATE 25 ML: 25 INJECTION, SOLUTION INTRAVENOUS at 05:17

## 2024-01-01 RX ADMIN — HEPARIN SODIUM 1800 UNITS/HR: 10000 INJECTION, SOLUTION INTRAVENOUS at 20:33

## 2024-01-01 RX ADMIN — CEFEPIME HYDROCHLORIDE 2 G: 2 INJECTION, POWDER, FOR SOLUTION INTRAVENOUS at 08:43

## 2024-01-01 RX ADMIN — MORPHINE SULFATE 2 MG: 2 INJECTION, SOLUTION INTRAMUSCULAR; INTRAVENOUS at 23:26

## 2024-01-01 RX ADMIN — MORPHINE SULFATE 2 MG: 2 INJECTION, SOLUTION INTRAMUSCULAR; INTRAVENOUS at 16:53

## 2024-01-01 RX ADMIN — GLYCOPYRROLATE 0.2 MG: 0.2 INJECTION, SOLUTION INTRAMUSCULAR; INTRAVENOUS at 23:45

## 2024-01-01 RX ADMIN — HEPARIN SODIUM 1800 UNITS/HR: 10000 INJECTION, SOLUTION INTRAVENOUS at 11:08

## 2024-01-01 RX ADMIN — DEXTROSE MONOHYDRATE 25 ML: 25 INJECTION, SOLUTION INTRAVENOUS at 11:03

## 2024-01-01 RX ADMIN — Medication: at 07:49

## 2024-01-01 RX ADMIN — MORPHINE SULFATE 2 MG: 2 INJECTION, SOLUTION INTRAMUSCULAR; INTRAVENOUS at 06:46

## 2024-01-01 RX ADMIN — DEXTROSE MONOHYDRATE: 100 INJECTION, SOLUTION INTRAVENOUS at 08:51

## 2024-01-01 RX ADMIN — PIPERACILLIN AND TAZOBACTAM 4.5 G: 4; .5 INJECTION, POWDER, LYOPHILIZED, FOR SOLUTION INTRAVENOUS at 21:55

## 2024-01-01 RX ADMIN — SENNOSIDES AND DOCUSATE SODIUM 2 TABLET: 50; 8.6 TABLET ORAL at 14:51

## 2024-01-01 RX ADMIN — MORPHINE SULFATE 1 MG: 2 INJECTION, SOLUTION INTRAMUSCULAR; INTRAVENOUS at 04:33

## 2024-01-01 RX ADMIN — HYDROMORPHONE HYDROCHLORIDE 0.2 MG: 0.2 INJECTION, SOLUTION INTRAMUSCULAR; INTRAVENOUS; SUBCUTANEOUS at 07:42

## 2024-01-01 RX ADMIN — CEFEPIME HYDROCHLORIDE 2 G: 2 INJECTION, POWDER, FOR SOLUTION INTRAVENOUS at 17:27

## 2024-01-01 RX ADMIN — HEPARIN SODIUM 2100 UNITS/HR: 10000 INJECTION, SOLUTION INTRAVENOUS at 00:13

## 2024-01-01 RX ADMIN — VANCOMYCIN HYDROCHLORIDE 1500 MG: 10 INJECTION, POWDER, LYOPHILIZED, FOR SOLUTION INTRAVENOUS at 17:24

## 2024-01-01 RX ADMIN — MORPHINE SULFATE 1 MG: 2 INJECTION, SOLUTION INTRAMUSCULAR; INTRAVENOUS at 10:57

## 2024-01-01 RX ADMIN — MORPHINE SULFATE 2 MG: 2 INJECTION, SOLUTION INTRAMUSCULAR; INTRAVENOUS at 06:05

## 2024-01-01 RX ADMIN — HYDROMORPHONE HYDROCHLORIDE 0.5 MG: 1 INJECTION, SOLUTION INTRAMUSCULAR; INTRAVENOUS; SUBCUTANEOUS at 16:42

## 2024-01-01 RX ADMIN — MORPHINE SULFATE 1 MG: 2 INJECTION, SOLUTION INTRAMUSCULAR; INTRAVENOUS at 02:34

## 2024-01-01 RX ADMIN — DEXAMETHASONE SODIUM PHOSPHATE 4 MG: 4 INJECTION, SOLUTION INTRA-ARTICULAR; INTRALESIONAL; INTRAMUSCULAR; INTRAVENOUS; SOFT TISSUE at 14:58

## 2024-01-01 RX ADMIN — MORPHINE SULFATE 1 MG: 2 INJECTION, SOLUTION INTRAMUSCULAR; INTRAVENOUS at 19:34

## 2024-01-01 RX ADMIN — MIDAZOLAM 0.5 MG: 1 INJECTION INTRAMUSCULAR; INTRAVENOUS at 14:20

## 2024-01-01 RX ADMIN — MIDAZOLAM 0.5 MG: 1 INJECTION INTRAMUSCULAR; INTRAVENOUS at 14:46

## 2024-01-01 RX ADMIN — PIPERACILLIN AND TAZOBACTAM 4.5 G: 4; .5 INJECTION, POWDER, LYOPHILIZED, FOR SOLUTION INTRAVENOUS at 04:41

## 2024-01-01 RX ADMIN — DEXTROSE AND SODIUM CHLORIDE: 5; 450 INJECTION, SOLUTION INTRAVENOUS at 21:10

## 2024-01-01 RX ADMIN — HEPARIN, PORCINE (PF) 10 UNIT/ML INTRAVENOUS SYRINGE 5 ML: at 22:16

## 2024-01-01 RX ADMIN — HEPARIN SODIUM 2100 UNITS/HR: 10000 INJECTION, SOLUTION INTRAVENOUS at 00:51

## 2024-01-01 RX ADMIN — VANCOMYCIN HYDROCHLORIDE 1500 MG: 10 INJECTION, POWDER, LYOPHILIZED, FOR SOLUTION INTRAVENOUS at 18:23

## 2024-01-01 RX ADMIN — HYDROMORPHONE HYDROCHLORIDE 0.5 MG: 1 INJECTION, SOLUTION INTRAMUSCULAR; INTRAVENOUS; SUBCUTANEOUS at 20:27

## 2024-01-01 RX ADMIN — MIDAZOLAM 0.5 MG: 1 INJECTION INTRAMUSCULAR; INTRAVENOUS at 16:21

## 2024-01-01 RX ADMIN — HEPARIN SODIUM 2250 UNITS/HR: 10000 INJECTION, SOLUTION INTRAVENOUS at 23:31

## 2024-01-01 RX ADMIN — MORPHINE SULFATE 2 MG: 2 INJECTION, SOLUTION INTRAMUSCULAR; INTRAVENOUS at 17:29

## 2024-01-01 RX ADMIN — FUROSEMIDE 20 MG: 10 INJECTION, SOLUTION INTRAMUSCULAR; INTRAVENOUS at 16:49

## 2024-01-01 RX ADMIN — MIDAZOLAM 0.5 MG: 1 INJECTION INTRAMUSCULAR; INTRAVENOUS at 14:33

## 2024-01-01 RX ADMIN — DEXTROSE AND SODIUM CHLORIDE: 5; 450 INJECTION, SOLUTION INTRAVENOUS at 15:33

## 2024-01-01 RX ADMIN — Medication 1 TABLET: at 07:58

## 2024-01-01 RX ADMIN — HYDROMORPHONE HYDROCHLORIDE 0.5 MG: 1 INJECTION, SOLUTION INTRAMUSCULAR; INTRAVENOUS; SUBCUTANEOUS at 10:08

## 2024-01-01 RX ADMIN — CEFEPIME HYDROCHLORIDE 2 G: 2 INJECTION, POWDER, FOR SOLUTION INTRAVENOUS at 00:38

## 2024-01-01 RX ADMIN — Medication 1 TABLET: at 08:30

## 2024-01-01 RX ADMIN — CHLOROTHIAZIDE SODIUM 500 MG: 500 INJECTION, POWDER, LYOPHILIZED, FOR SOLUTION INTRAVENOUS at 06:03

## 2024-01-01 RX ADMIN — MORPHINE SULFATE 1 MG: 2 INJECTION, SOLUTION INTRAMUSCULAR; INTRAVENOUS at 23:38

## 2024-01-01 RX ADMIN — FENTANYL CITRATE 25 MCG: 50 INJECTION, SOLUTION INTRAMUSCULAR; INTRAVENOUS at 15:28

## 2024-01-01 RX ADMIN — LIDOCAINE 1 PATCH: 4 PATCH TOPICAL at 20:33

## 2024-01-01 RX ADMIN — GLYCOPYRROLATE 0.2 MG: 0.2 INJECTION, SOLUTION INTRAMUSCULAR; INTRAVENOUS at 09:15

## 2024-01-01 RX ADMIN — HEPARIN SODIUM 2250 UNITS/HR: 10000 INJECTION, SOLUTION INTRAVENOUS at 10:02

## 2024-01-01 RX ADMIN — FUROSEMIDE 40 MG: 10 INJECTION, SOLUTION INTRAVENOUS at 06:30

## 2024-01-01 RX ADMIN — MORPHINE SULFATE 2 MG: 2 INJECTION, SOLUTION INTRAMUSCULAR; INTRAVENOUS at 02:13

## 2024-01-01 RX ADMIN — DEXTROSE MONOHYDRATE: 100 INJECTION, SOLUTION INTRAVENOUS at 19:06

## 2024-01-01 RX ADMIN — FENTANYL CITRATE 25 MCG: 50 INJECTION, SOLUTION INTRAMUSCULAR; INTRAVENOUS at 15:55

## 2024-01-01 RX ADMIN — FENTANYL CITRATE 25 MCG: 50 INJECTION, SOLUTION INTRAMUSCULAR; INTRAVENOUS at 16:22

## 2024-01-01 RX ADMIN — HYDROMORPHONE HYDROCHLORIDE 0.2 MG: 0.2 INJECTION, SOLUTION INTRAMUSCULAR; INTRAVENOUS; SUBCUTANEOUS at 04:37

## 2024-01-01 RX ADMIN — HEPARIN, PORCINE (PF) 10 UNIT/ML INTRAVENOUS SYRINGE 5 ML: at 23:33

## 2024-01-01 RX ADMIN — HEPARIN SODIUM 1950 UNITS/HR: 10000 INJECTION, SOLUTION INTRAVENOUS at 21:48

## 2024-01-01 RX ADMIN — LIDOCAINE 1 PATCH: 4 PATCH TOPICAL at 21:02

## 2024-01-01 RX ADMIN — CEFEPIME HYDROCHLORIDE 2 G: 2 INJECTION, POWDER, FOR SOLUTION INTRAVENOUS at 00:32

## 2024-01-01 RX ADMIN — DEXTROSE AND SODIUM CHLORIDE: 5; 450 INJECTION, SOLUTION INTRAVENOUS at 03:26

## 2024-01-01 RX ADMIN — MIDAZOLAM 0.5 MG: 1 INJECTION INTRAMUSCULAR; INTRAVENOUS at 15:55

## 2024-01-01 RX ADMIN — CEFEPIME HYDROCHLORIDE 2 G: 2 INJECTION, POWDER, FOR SOLUTION INTRAVENOUS at 01:05

## 2024-01-01 RX ADMIN — DEXTROSE MONOHYDRATE: 100 INJECTION, SOLUTION INTRAVENOUS at 22:43

## 2024-01-01 RX ADMIN — FUROSEMIDE 20 MG: 10 INJECTION, SOLUTION INTRAMUSCULAR; INTRAVENOUS at 16:15

## 2024-01-01 RX ADMIN — HEPARIN SODIUM 2250 UNITS/HR: 10000 INJECTION, SOLUTION INTRAVENOUS at 10:50

## 2024-01-01 RX ADMIN — OXYCODONE HYDROCHLORIDE 5 MG: 5 TABLET ORAL at 18:39

## 2024-01-01 RX ADMIN — CEFEPIME HYDROCHLORIDE 2 G: 2 INJECTION, POWDER, FOR SOLUTION INTRAVENOUS at 08:13

## 2024-01-01 RX ADMIN — HYDROMORPHONE HYDROCHLORIDE 0.5 MG: 1 INJECTION, SOLUTION INTRAMUSCULAR; INTRAVENOUS; SUBCUTANEOUS at 08:19

## 2024-01-01 ASSESSMENT — ACTIVITIES OF DAILY LIVING (ADL)
ADLS_ACUITY_SCORE: 49
ADLS_ACUITY_SCORE: 53
ADLS_ACUITY_SCORE: 48
ADLS_ACUITY_SCORE: 35
ADLS_ACUITY_SCORE: 49
ADLS_ACUITY_SCORE: 53
ADLS_ACUITY_SCORE: 49
ADLS_ACUITY_SCORE: 48
ADLS_ACUITY_SCORE: 53
ADLS_ACUITY_SCORE: 47
ADLS_ACUITY_SCORE: 35
ADLS_ACUITY_SCORE: 35
ADLS_ACUITY_SCORE: 46
ADLS_ACUITY_SCORE: 49
ADLS_ACUITY_SCORE: 48
ADLS_ACUITY_SCORE: 53
ADLS_ACUITY_SCORE: 48
ADLS_ACUITY_SCORE: 47
ADLS_ACUITY_SCORE: 49
ADLS_ACUITY_SCORE: 47
ADLS_ACUITY_SCORE: 46
ADLS_ACUITY_SCORE: 53
HEARING_DIFFICULTY_OR_DEAF: NO
ADLS_ACUITY_SCORE: 49
ADLS_ACUITY_SCORE: 51
ADLS_ACUITY_SCORE: 51
ADLS_ACUITY_SCORE: 53
ADLS_ACUITY_SCORE: 48
ADLS_ACUITY_SCORE: 49
ADLS_ACUITY_SCORE: 49
ADLS_ACUITY_SCORE: 51
ADLS_ACUITY_SCORE: 49
FALL_HISTORY_WITHIN_LAST_SIX_MONTHS: NO
ADLS_ACUITY_SCORE: 46
ADLS_ACUITY_SCORE: 49
ADLS_ACUITY_SCORE: 32
ADLS_ACUITY_SCORE: 49
ADLS_ACUITY_SCORE: 53
DRESSING/BATHING_DIFFICULTY: NO
ADLS_ACUITY_SCORE: 49
ADLS_ACUITY_SCORE: 51
ADLS_ACUITY_SCORE: 53
ADLS_ACUITY_SCORE: 48
ADLS_ACUITY_SCORE: 51
ADLS_ACUITY_SCORE: 48
DOING_ERRANDS_INDEPENDENTLY_DIFFICULTY: YES
ADLS_ACUITY_SCORE: 35
ADLS_ACUITY_SCORE: 47
ADLS_ACUITY_SCORE: 35
WEAR_GLASSES_OR_BLIND: YES
ADLS_ACUITY_SCORE: 35
ADLS_ACUITY_SCORE: 47
ADLS_ACUITY_SCORE: 48
ADLS_ACUITY_SCORE: 53
ADLS_ACUITY_SCORE: 51
ADLS_ACUITY_SCORE: 46
ADLS_ACUITY_SCORE: 48
ADLS_ACUITY_SCORE: 49
DIFFICULTY_COMMUNICATING: NO
ADLS_ACUITY_SCORE: 53
ADLS_ACUITY_SCORE: 47
ADLS_ACUITY_SCORE: 48
ADLS_ACUITY_SCORE: 49
ADLS_ACUITY_SCORE: 48
ADLS_ACUITY_SCORE: 35
ADLS_ACUITY_SCORE: 53
ADLS_ACUITY_SCORE: 53
ADLS_ACUITY_SCORE: 51
ADLS_ACUITY_SCORE: 35
ADLS_ACUITY_SCORE: 51
ADLS_ACUITY_SCORE: 47
ADLS_ACUITY_SCORE: 32
ADLS_ACUITY_SCORE: 48
ADLS_ACUITY_SCORE: 49
ADLS_ACUITY_SCORE: 46
ADLS_ACUITY_SCORE: 49
ADLS_ACUITY_SCORE: 48
ADLS_ACUITY_SCORE: 51
ADLS_ACUITY_SCORE: 46
ADLS_ACUITY_SCORE: 51
ADLS_ACUITY_SCORE: 51
ADLS_ACUITY_SCORE: 48
ADLS_ACUITY_SCORE: 49
ADLS_ACUITY_SCORE: 46
ADLS_ACUITY_SCORE: 53
ADLS_ACUITY_SCORE: 46
ADLS_ACUITY_SCORE: 49
ADLS_ACUITY_SCORE: 35
ADLS_ACUITY_SCORE: 46
ADLS_ACUITY_SCORE: 46
CHANGE_IN_FUNCTIONAL_STATUS_SINCE_ONSET_OF_CURRENT_ILLNESS/INJURY: NO
CONCENTRATING,_REMEMBERING_OR_MAKING_DECISIONS_DIFFICULTY: NO
ADLS_ACUITY_SCORE: 49
ADLS_ACUITY_SCORE: 35
ADLS_ACUITY_SCORE: 47
ADLS_ACUITY_SCORE: 49
ADLS_ACUITY_SCORE: 48
ADLS_ACUITY_SCORE: 53
ADLS_ACUITY_SCORE: 46
ADLS_ACUITY_SCORE: 53
ADLS_ACUITY_SCORE: 53
ADLS_ACUITY_SCORE: 49
ADLS_ACUITY_SCORE: 32
ADLS_ACUITY_SCORE: 35
WALKING_OR_CLIMBING_STAIRS_DIFFICULTY: YES
ADLS_ACUITY_SCORE: 35
ADLS_ACUITY_SCORE: 35
ADLS_ACUITY_SCORE: 49
ADLS_ACUITY_SCORE: 53
ADLS_ACUITY_SCORE: 53
ADLS_ACUITY_SCORE: 49
ADLS_ACUITY_SCORE: 47
ADLS_ACUITY_SCORE: 35
ADLS_ACUITY_SCORE: 47
ADLS_ACUITY_SCORE: 53
ADLS_ACUITY_SCORE: 47
ADLS_ACUITY_SCORE: 53
DIFFICULTY_EATING/SWALLOWING: NO
ADLS_ACUITY_SCORE: 47
ADLS_ACUITY_SCORE: 47
ADLS_ACUITY_SCORE: 53
ADLS_ACUITY_SCORE: 53
ADLS_ACUITY_SCORE: 49
ADLS_ACUITY_SCORE: 47
ADLS_ACUITY_SCORE: 49
ADLS_ACUITY_SCORE: 53
ADLS_ACUITY_SCORE: 49
ADLS_ACUITY_SCORE: 35
ADLS_ACUITY_SCORE: 46
ADLS_ACUITY_SCORE: 32
ADLS_ACUITY_SCORE: 47
ADLS_ACUITY_SCORE: 49
ADLS_ACUITY_SCORE: 48
ADLS_ACUITY_SCORE: 47
ADLS_ACUITY_SCORE: 24
ADLS_ACUITY_SCORE: 47
ADLS_ACUITY_SCORE: 51
ADLS_ACUITY_SCORE: 46
ADLS_ACUITY_SCORE: 49
ADLS_ACUITY_SCORE: 46
ADLS_ACUITY_SCORE: 49
ADLS_ACUITY_SCORE: 35
WALKING_OR_CLIMBING_STAIRS: AMBULATION DIFFICULTY, REQUIRES EQUIPMENT
ADLS_ACUITY_SCORE: 53
ADLS_ACUITY_SCORE: 48
ADLS_ACUITY_SCORE: 49
ADLS_ACUITY_SCORE: 35
ADLS_ACUITY_SCORE: 49
ADLS_ACUITY_SCORE: 48
TOILETING_ISSUES: NO
ADLS_ACUITY_SCORE: 46
ADLS_ACUITY_SCORE: 49
ADLS_ACUITY_SCORE: 35
ADLS_ACUITY_SCORE: 48
ADLS_ACUITY_SCORE: 47
ADLS_ACUITY_SCORE: 47
ADLS_ACUITY_SCORE: 53
ADLS_ACUITY_SCORE: 53
ADLS_ACUITY_SCORE: 51
ADLS_ACUITY_SCORE: 49
ADLS_ACUITY_SCORE: 47
ADLS_ACUITY_SCORE: 32
ADLS_ACUITY_SCORE: 35
ADLS_ACUITY_SCORE: 47
ADLS_ACUITY_SCORE: 53
ADLS_ACUITY_SCORE: 53
ADLS_ACUITY_SCORE: 32
ADLS_ACUITY_SCORE: 35
ADLS_ACUITY_SCORE: 53
ADLS_ACUITY_SCORE: 49
ADLS_ACUITY_SCORE: 53
ADLS_ACUITY_SCORE: 46
ADLS_ACUITY_SCORE: 32
ADLS_ACUITY_SCORE: 49
ADLS_ACUITY_SCORE: 49
ADLS_ACUITY_SCORE: 53
ADLS_ACUITY_SCORE: 47
ADLS_ACUITY_SCORE: 51
ADLS_ACUITY_SCORE: 49
ADLS_ACUITY_SCORE: 49
ADLS_ACUITY_SCORE: 53
ADLS_ACUITY_SCORE: 49
ADLS_ACUITY_SCORE: 46
ADLS_ACUITY_SCORE: 46
ADLS_ACUITY_SCORE: 47
ADLS_ACUITY_SCORE: 53
ADLS_ACUITY_SCORE: 51
ADLS_ACUITY_SCORE: 48
ADLS_ACUITY_SCORE: 48
ADLS_ACUITY_SCORE: 47
ADLS_ACUITY_SCORE: 49
ADLS_ACUITY_SCORE: 35
ADLS_ACUITY_SCORE: 49
ADLS_ACUITY_SCORE: 49
ADLS_ACUITY_SCORE: 47
ADLS_ACUITY_SCORE: 47

## 2024-01-01 ASSESSMENT — COLUMBIA-SUICIDE SEVERITY RATING SCALE - C-SSRS
6. HAVE YOU EVER DONE ANYTHING, STARTED TO DO ANYTHING, OR PREPARED TO DO ANYTHING TO END YOUR LIFE?: NO
2. HAVE YOU ACTUALLY HAD ANY THOUGHTS OF KILLING YOURSELF IN THE PAST MONTH?: NO
1. IN THE PAST MONTH, HAVE YOU WISHED YOU WERE DEAD OR WISHED YOU COULD GO TO SLEEP AND NOT WAKE UP?: NO

## 2024-09-04 NOTE — ED PROVIDER NOTES
"    [place] EMERGENCY DEPARTMENT ([facility])    9/04/24       ED PROVIDER NOTE   ED 15   History     Chief Complaint   Patient presents with    Leg Swelling     The history is provided by the patient and medical records (Medical records are limited, patient does not get regular checkups).     Sherwin Benites is a 68 year old female with history of COPD, obstructive sleep apnea, morbid obesity complicated by obesity hypoventilation syndrome on chronic O2 3 LPM who presents to the emergency department with abdominal pain, shortness of breath, poor appetite, poor oral intake, decreased activity tolerance, generalized weakness and bilateral lower extremity swelling.  Patient states that these symptoms have been progressive in onset but ongoing for some time.  She states that this abdominal pain has been ongoing for a year along with poor appetite, has lost over 150 lbs over the past year.  She states that typically she ambulates via walker and lives in a home with her partner who is in a wheelchair.  However she has been increasingly weak to the point where she cannot even ambulate with her walker and cannot transfer in and out of bed and has increased dyspnea with exertion.  Her legs have been increasingly swollen over the past 2 months.  911 was called and paramedics noted that her O2 sats were at 71% even on her usual O2 3LPM.  They increased her O2 via nasal cannula to 6 L/min with improvement of her O2 sats to the 90s.  She was maintained on that through triage.  She reports history of prior MI. No chest pain, states she takes hawthorn. No history of clots.  She notes being given warfarin during her hospitalization in 2013 \"and it ruined stuff,\" messed up her hands and legs. Not currently on anticoagulation. Has been having some low back pain radiating to her anterior lower abdomen \"feels like having a baby every day,\" along with diarrhea, mild cough as well as bleeding from either vaginal or urinary " source.  Cannot differentiate whether the bleeding is from her vagina or in her urine.  She wears menstrual pads for this.  No recent antibiotics.  Denies falls.     Past Medical History  Past Medical History:   Diagnosis Date    Arthritis ~2002, ~2015    thumbs, feet, knees, hips    COPD (chronic obstructive pulmonary disease) (H) Feb 2013    HN flu    Depressive disorder younger person    stay ok w/vit-min & good nutrition    Heart disease ~2004    1 heart attack then, another later.    Morbid obesity (H)     Pneumonia     Hx of multiple pneumonia    Premature infant     born 3 months early    Uncomplicated asthma baby    winter bronchitis in adulthood     Past Surgical History:   Procedure Laterality Date    BREAST BIOPSY, RT/LT  2005    Right    BREAST SURGERY  ~1995?    biopsy only -neg    CHOLECYSTECTOMY       ORDER FOR DME      Allergies   Allergen Reactions    Echinacea Difficulty breathing    Latex     Penicillins      Can't tolerate the smell of the tablet     Family History  Family History   Problem Relation Age of Onset    C.A.D. Father     Cerebrovascular Disease Father         Due to surgery above.    Genitourinary Problems Father         CKD    Coronary Artery Disease Father         Carotid cleaned out    Substance Abuse Father         alcoholic    Alzheimer Disease Mother     Respiratory Mother         copd    Substance Abuse Mother         alcoholic    Unknown/Adopted Mother         age 79 bp Rx reaction    Unknown/Adopted Maternal Grandmother         age 34 childbirth    Cerebrovascular Disease Maternal Grandfather         age 82    Coronary Artery Disease Paternal Grandfather         age 55    Coronary Artery Disease Other         age 66 endocarditis    Coronary Artery Disease Other         age 71 myocarditis w/decompensation    Coronary Artery Disease Other         age 76 arteriosclerotic heart disease    Coronary Artery Disease Other         age 73 coronary sclerosis    Coronary Artery Disease  Other         age 84 myocardian infarction    Coronary Artery Disease Other         age 61    Coronary Artery Disease Other         age 83    Hypertension Other         age 77    Cerebrovascular Disease Other         age 75    Cerebrovascular Disease Other         age 89    Cerebrovascular Disease Other         age 83 apoplexy    Other Cancer Other         89 unk cancer    Substance Abuse Other         alcoholic    Substance Abuse Other         alcoholic    Substance Abuse Other     Unknown/Adopted Other         age 22 childbirth    Unknown/Adopted Other         age 89 pneumonia    Unknown/Adopted Other         age 81 pneumonia    Unknown/Adopted Other         age 74 acute nephritis     Social History   Social History     Tobacco Use    Smoking status: Never    Smokeless tobacco: Never   Substance Use Topics    Alcohol use: Never    Drug use: Never      A medically appropriate review of systems was performed with pertinent positives and negatives noted in the HPI, and all other systems negative.    Physical Exam   BP: 133/76  Pulse: 85  Temp: 98.4  F (36.9  C)  Resp: 16  SpO2: 96 %    Physical Exam  Physical Exam   Constitutional: oriented to person, place, and time. appears well-developed and well-nourished.   HENT:   Head: Normocephalic and atraumatic.   Neck: Normal range of motion.   Pulmonary/Chest: Effort normal. No respiratory distress.  Clear lungs bilaterally.  Cardiac: No murmurs, rubs, gallops. RRR.  Abdominal: Abdomen soft, nontender, nondistended. No rebound tenderness.  MSK: Long bones without deformity or evidence of trauma.  Bilateral lower extremity edema.  Neurological: alert and oriented to person, place, and time.   Skin: Skin is warm and dry.  No erythema or rashes.  Psychiatric:  normal mood and affect.  behavior is normal. Thought content normal.     ED Course, Procedures, & Data      Procedures  Results for orders placed during the hospital encounter of 09/04/24    POC US ECHO  LIMITED    Impression  Limited Bedside Cardiac Ultrasound, performed and interpreted by me.  Indication: Shortness of Breath.  Parasternal long axis, parasternal short axis, apical 4 chamber, and lung views were acquired.  Difficult views due to habitus    Findings:  Left ventricle appears to be with good ejection fraction.  No large pericardial effusion.  No B-lines bilaterally.    IMPRESSION: Appears to have grossly normal left ventricular function..            EKG Interpretation:      Interpreted by Barry Freeman MD  Time reviewed: 1645  Symptoms at time of EKG: SOB   Rhythm: normal sinus   Rate: Normal  Axis: Left Axis Deviation  Ectopy: none  Conduction: normal  ST Segments/ T Waves: No acute ischemic changes  Q Waves: none  Comparison to prior: QRS is widened from prior 10 years ago, no acute ischemic changes.    Clinical Impression: Intraventricular block, no obvious signs of ischemia or infarction.         Results for orders placed or performed during the hospital encounter of 09/04/24   POC US ECHO LIMITED     Status: None    Impression    Limited Bedside Cardiac Ultrasound, performed and interpreted by me.   Indication: Shortness of Breath.  Parasternal long axis, parasternal short axis, apical 4 chamber, and lung views were acquired.   Difficult views due to habitus    Findings:    Left ventricle appears to be with good ejection fraction.  No large pericardial effusion.  No B-lines bilaterally.    IMPRESSION: Appears to have grossly normal left ventricular function..       XR Chest 2 Views     Status: None    Narrative    Exam: XR CHEST 2 VIEWS, 9/4/2024 5:19 PM    Comparison: CT chest 4/19/2013    History: sob    Findings:  AP and lateral views of the chest.. Trachea is midline. Overall heart  size is within normal limits. Prominent pulmonary artery shadow. No  focal airspace opacity. No pneumothorax. The right costophrenic angle  is sharp. The left costophrenic angle is out of the field-of-view.  The  visualized upper abdomen is unremarkable. No acute osseous  abnormalities. Degenerative changes of the shoulders.      Impression    Impression:   1. No acute airspace abnormality.  2. Prominent pulmonary artery shadow, can be seen with pulmonary  hypertension.    I have personally reviewed the examination and initial interpretation  and I agree with the findings.    JANELL ARAMBULA MD         SYSTEM ID:  [id number]   US Lower Extremity Venous Duplex Bilateral     Status: Abnormal   Result Value Ref Range    Radiologist flags Deep vein thrombosis (Urgent)     Narrative    EXAMINATION: DOPPLER VENOUS ULTRASOUND OF BILATERAL LOWER EXTREMITIES,  9/4/2024 6:26 PM     COMPARISON: 6/18/2013    HISTORY: lower extremity edema    TECHNIQUE:  Gray-scale evaluation with compression, spectral flow and  color Doppler assessment of the deep venous system of both legs from  groin to knee, and then at the ankles.    FINDINGS:  Right:   Partial compressibility of the right CFV. No compressibility of the  popliteal vein, posterior tibial vein. The GSV is fully compressible.    Left:  Patent left CFV, profundus, femoral vein, popliteal vein, and  posterior tibial vein.       Impression    IMPRESSION:  Partially occlusive deep vein thrombosis of the right CFV. Fully  occlusive deep vein thrombosis of the right popliteal and posterior  tibial vein.      [Urgent Result: Deep vein thrombosis]    Finding was identified on 9/4/2024 6:27 PM.     Dr. Freeman was contacted by Dr. Agustin Whaley at 9/4/2024 6:34 PM and  verbalized understanding of the urgent finding.     I have personally reviewed the examination and initial interpretation  and I agree with the findings.    JANELL ARAMBULA MD         SYSTEM ID:  [id number]   CT Abdomen Pelvis w Contrast     Status: None    Narrative    EXAM: CT ABDOMEN PELVIS W CONTRAST  LOCATION: [facility]  DATE: 9/4/2024    INDICATION: low hgb, abdominal  pain  COMPARISON: Same day bilateral lower extremity ultrasound, Pelvic ultrasound 6/26/2013  TECHNIQUE: CT scan of the abdomen and pelvis was performed following injection of IV contrast. Multiplanar reformats were obtained. Dose reduction techniques were used.  CONTRAST: 135ml isovue 370    FINDINGS:   LOWER CHEST: Please see report from concurrent chest CT for findings above the diaphragm.    HEPATOBILIARY: Cholecystectomy. No evidence of biliary obstruction. Perfusional changes adjacent to falciform. No suspicious liver lesion visualized.    PANCREAS: Normal.    SPLEEN: Normal.    ADRENAL GLANDS: Normal.    KIDNEYS/BLADDER: Normal.    BOWEL: No hydronephrosis. Urinary bladder is unremarkable.    LYMPH NODES: Multiple enlarged retroperitoneal lymph nodes with retrocaval mass measuring 4.1 x 3.4 cm (series 4 image 97).    VASCULATURE: Filling defect noted in the right common femoral vein (series 4 image 331) likely correspond to findings on same day venous ultrasound..    PELVIC ORGANS: Likely irregular mass in the cervix measuring up to 8.2 cm (series 7 image 78). Possible superimposed uterine fibroids.    MUSCULOSKELETAL: No acute bony abnormality or suspicious osseous lesions. Mild body wall edema.      Impression    IMPRESSION:   1.  Likely large cervical mass suspicious for primary neoplasm.  2.  Enlarged retroperitoneal lymph nodes, suspicious for disease involvement.  3.  Deep vein thrombus in the right common femoral vein, as seen on same day vascular ultrasound.    Findings discussed with Dr. Freeman at 9:42 PM on 9/4/2024 by Dr. Gutierrez   CT Chest Pulmonary Embolism w Contrast     Status: Abnormal   Result Value Ref Range    Radiologist flags (AA)      New diagnosis of pulmonary embolism and presumed metastatic adenopathy.    Narrative    EXAM: CT CHEST PULMONARY EMBOLISM W CONTRAST  LOCATION: [facility]  DATE: 9/4/2024    INDICATION: hypoxia, elevated d  dimer  COMPARISON: CT AP earlier today, CT chest 4/19/2013, CTA chest 2/7/2013  TECHNIQUE: CT chest pulmonary angiogram during arterial phase injection of IV contrast. Multiplanar reformats and MIP reconstructions were performed. Dose reduction techniques were used.   CONTRAST: 135ml isovue 370    FINDINGS:  ANGIOGRAM CHEST: Excellent opacification of pulmonary arteries and branches. Bilateral segmental and subsegmental pulmonary emboli noted in the right upper, middle and both lower lobes. Main pulmonary artery is enlarged to 3.9 cm.  No RV strain. Aorta   and branches are patent.    LUNGS AND PLEURA: There is a continued mosaic appearance to the lungs with bandlike areas of fibroatelectasis anteriorly in both upper lobes, decreased from before. No effusions. No intralobular septal thickening.    MEDIASTINUM/AXILLAE: There a few small left supraclavicular nodes measuring up to a centimeter. There is an enlarged lower right para-aortic node above the hiatus measuring 1.5 cm (axial image 262). No axillary adenopathy. Multiple mediastinal and   bilateral hilar nodes are bit more prominent than before with the largest measuring 1.2 cm (axial image 133). No pericardiophrenic nodes. There is a trace pericardial effusion.    CORONARY ARTERY CALCIFICATION: Cannot evaluate.    UPPER ABDOMEN: Enlarged retrocaval node noted at the level of the right renal artery measuring 4.6 x 2.9 cm (axial image 321). Multiple smaller left para-aortic nodes are present. Incidental splenule. Marked pancreatic atrophy. Prior cholecystectomy.      MUSCULOSKELETAL: No suspicious lesions. Third spacing of fluid with subcutaneous edema especially dependently.      Impression    IMPRESSION:  1.  Patient has bilateral segmental and subsegmental pulmonary emboli without RV strain.  2.  Main pulmonary artery is enlarged at 3.9 cm, little changed over the last decade and consistent with pulmonary arterial hypertension..  3.  Suspicious adenopathy as  noted above in the left supraclavicular, lower right para-aortic and  left retrocaval region, highly suspicious for malignancy, likely cervical (see report of CT AP earlier today).  4.  Mosaic appearance to the lungs again noted with fewer areas of fibroatelectasis. Long-standing appearance suggests chronic small airway disease.  5.  There is a very small pericardial effusion.    [Critical Result: New diagnosis of pulmonary embolism and presumed metastatic adenopathy.]    Finding was identified on 9/4/2024 9:08 PM CDT.     1.  Dr. Barry Freeman was contacted by me on 9/4/2024 9:10 PM CDT and verbalized understanding of the critical result.    Comprehensive metabolic panel     Status: Abnormal   Result Value Ref Range    Sodium 138 135 - 145 mmol/L    Potassium 4.1 3.4 - 5.3 mmol/L    Carbon Dioxide (CO2) 19 (L) 22 - 29 mmol/L    Anion Gap 14 7 - 15 mmol/L    Urea Nitrogen 32.9 (H) 8.0 - 23.0 mg/dL    Creatinine 1.53 (H) 0.51 - 0.95 mg/dL    GFR Estimate 37 (L) >60 mL/min/1.73m2    Calcium 8.2 (L) 8.8 - 10.4 mg/dL    Chloride 105 98 - 107 mmol/L    Glucose 84 70 - 99 mg/dL    Alkaline Phosphatase 126 40 - 150 U/L    AST 17 0 - 45 U/L    ALT <5 0 - 50 U/L    Protein Total 6.0 (L) 6.4 - 8.3 g/dL    Albumin 2.7 (L) 3.5 - 5.2 g/dL    Bilirubin Total 0.3 <=1.2 mg/dL   Troponin T, High Sensitivity     Status: Abnormal   Result Value Ref Range    Troponin T, High Sensitivity 39 (H) <=14 ng/L   Lactic acid whole blood with 1x repeat in 2 hr when >2     Status: Normal   Result Value Ref Range    Lactic Acid, Initial 1.2 0.7 - 2.0 mmol/L   D dimer quantitative     Status: Abnormal   Result Value Ref Range    D-Dimer Quantitative 2.91 (H) 0.00 - 0.50 ug/mL FEU    Narrative    This D-dimer assay is intended for use in conjunction with a clinical pretest probability assessment model to exclude pulmonary embolism (PE) and deep venous thrombosis (DVT) in outpatients suspected of PE or DVT. The cut-off value is 0.50 ug/mL  FEU.    For patients 50 years of age or older, the application of age-adjusted cut-off values for D-Dimer may increase the specificity without significant effect on sensitivity. The literature suggested calculation age adjusted cut-off in ug/L = age in years x 10 ug/L. The results in this laboratory are reported as ug/mL rather than ug/L. The calculation for age adjusted cut off in ug/mL= age in years x 0.01 ug/mL. For example, the cut off for a 76 year old male is 76 x 0.01 ug/mL = 0.76 ug/mL (760 ug/L).    M Anibal et al. Age adjusted D-dimer cut-off levels to rule out pulmonary embolism: The ADJUST-PE Study. BARBARA 2014;311:2086-1140.; HJ Rebecca et al. Diagnostic accuracy of conventional or age adjusted D-dimer cutoff values in older patients with suspected venous thromboembolism. Systemic review and meta-analysis. BMJ 2013:346:f2492.   Nt probnp inpatient (BNP)     Status: Abnormal   Result Value Ref Range    N terminal Pro BNP Inpatient 23,140 (H) 0 - 900 pg/mL   Lipase     Status: Normal   Result Value Ref Range    Lipase 14 13 - 60 U/L   TSH with free T4 reflex     Status: Abnormal   Result Value Ref Range    TSH 4.65 (H) 0.30 - 4.20 uIU/mL   Magnesium     Status: Normal   Result Value Ref Range    Magnesium 2.1 1.7 - 2.3 mg/dL   CBC with platelets and differential     Status: Abnormal   Result Value Ref Range    WBC Count 17.9 (H) 4.0 - 11.0 10e3/uL    RBC Count 2.63 (L) 3.80 - 5.20 10e6/uL    Hemoglobin 4.9 (LL) 11.7 - 15.7 g/dL    Hematocrit 18.6 (L) 35.0 - 47.0 %    MCV 71 (L) 78 - 100 fL    MCH 18.6 (L) 26.5 - 33.0 pg    MCHC 26.3 (L) 31.5 - 36.5 g/dL    RDW 22.5 (H) 10.0 - 15.0 %    Platelet Count 288 150 - 450 10e3/uL    % Neutrophils 85 %    % Lymphocytes 8 %    % Monocytes 6 %    % Eosinophils 0 %    % Basophils 0 %    % Immature Granulocytes 1 %    NRBCs per 100 WBC 0 <1 /100    Absolute Neutrophils 15.1 (H) 1.6 - 8.3 10e3/uL    Absolute Lymphocytes 1.4 0.8 - 5.3 10e3/uL    Absolute Monocytes 1.2  0.0 - 1.3 10e3/uL    Absolute Eosinophils 0.1 0.0 - 0.7 10e3/uL    Absolute Basophils 0.0 0.0 - 0.2 10e3/uL    Absolute Immature Granulocytes 0.2 <=0.4 10e3/uL    Absolute NRBCs 0.0 10e3/uL   Extra Tube ([place] Draw)     Status: None ()    Narrative    The following orders were created for panel order Extra Tube ([place] Draw).  Procedure                               Abnormality         Status                     ---------                               -----------         ------                     Extra Red Top Tube[[id number]]                                                          Extra Blood Bank Purple ...[[id number]]                                                   Please view results for these tests on the individual orders.   T4 free     Status: Normal   Result Value Ref Range    Free T4 1.15 0.90 - 1.70 ng/dL   Iron and iron binding capacity     Status: Abnormal   Result Value Ref Range    Iron 9 (L) 37 - 145 ug/dL    Iron Binding Capacity 248 240 - 430 ug/dL    Iron Sat Index 4 (L) 15 - 46 %   Adult Type and Screen     Status: None   Result Value Ref Range    ABO/RH(D) A POS     Antibody Screen Negative Negative    SPECIMEN EXPIRATION DATE [id number]    Prepare red blood cells (unit)     Status: None (Preliminary result)   Result Value Ref Range    Blood Component Type Red Blood Cells     Product Code Y0964I11     Unit Status Ready for issue     Unit Number [id number]     CROSSMATCH Compatible     CODING SYSTEM [id number]    Prepare red blood cells (unit)     Status: None   Result Value Ref Range    Blood Component Type Red Blood Cells     Product Code G3855A07     Unit Status Transfused     Unit Number [id number]     CROSSMATCH Compatible     CODING SYSTEM [id number]     ISSUE DATE AND TIME [id number]     UNIT ABO/RH A+     UNIT TYPE ISBT 6200    CBC with platelets differential     Status: Abnormal    Narrative    The following orders were created for panel order CBC with platelets  differential.  Procedure                               Abnormality         Status                     ---------                               -----------         ------                     CBC with platelets and d...[[id number]]  Abnormal            Final result                 Please view results for these tests on the individual orders.   ABO/Rh type and screen     Status: None    Narrative    The following orders were created for panel order ABO/Rh type and screen.  Procedure                               Abnormality         Status                     ---------                               -----------         ------                     Adult Type and Screen[[id number]]                            Final result                 Please view results for these tests on the individual orders.     Medications - No data to display  Labs Ordered and Resulted from Time of ED Arrival to Time of ED Departure   LACTIC ACID WHOLE BLOOD WITH 1X REPEAT IN 2 HR WHEN >2 - Normal       Result Value    Lactic Acid, Initial 1.2     COMPREHENSIVE METABOLIC PANEL   TROPONIN T, HIGH SENSITIVITY   D DIMER QUANTITATIVE   NT PROBNP INPATIENT   LIPASE   TSH WITH FREE T4 REFLEX   MAGNESIUM   ROUTINE UA WITH MICROSCOPIC REFLEX TO CULTURE   CBC WITH PLATELETS AND DIFFERENTIAL     POC US ECHO LIMITED   Final Result   Limited Bedside Cardiac Ultrasound, performed and interpreted by me.    Indication: Shortness of Breath.   Parasternal long axis, parasternal short axis, apical 4 chamber, and lung views were acquired.    Difficult views due to habitus      Findings:     Left ventricle appears to be with good ejection fraction.  No large pericardial effusion.  No B-lines bilaterally.      IMPRESSION: Appears to have grossly normal left ventricular function..            XR Chest 2 Views    (Results Pending)   US Lower Extremity Venous Duplex Bilateral    (Results Pending)          Critical Care Addendum  My initial assessment, based on my review of  nursing observations, review of vital signs, physical exam, and interpretation of labs, ct , established a high suspicion that Sherwin Benites has respiratory distress, which requires immediate intervention, and therefore she is critically ill.     After the initial assessment, the care team initiated medication therapy with heparin, prbc transfusion  to provide stabilization care. Due to the critical nature of this patient, I reassessed vital signs, physical exam, and respiratory status multiple times prior to her disposition.     Time also spent performing documentation, reviewing test results, discussion with consultants, and coordination of care.     Critical care time (excluding teaching time and procedures): 45 minutes.       Assessment & Plan    MDM  Patient here with fatigue.  She is found to be profoundly anemic from 2 years of vaginal bleeding.  She likely has cervical cancer based on imaging.  She was transfused.  She does have a DVT and PE.  I did do a PERT activation due to elevated BNP, hypoxia.  No urgent or emergent thrombectomy or tPA required.  I discussed with hematology before starting heparin, they recommended heparin without a bolus.  Patient was transfused 2 units.  She has remained normotensive and with a normal heart rate.  Patient deferred a gynecologic exam as it seems that the bleeding is fairly minimal.  Will admit to medicine at this point for further care.    I have reviewed the nursing notes. I have reviewed the findings, diagnosis, plan and need for follow up with the patient.    New Prescriptions    No medications on file       Final diagnoses:   Multiple subsegmental pulmonary emboli without acute cor pulmonale (H)   Acute and chronic respiratory failure with hypoxia (H)     IAmaris, am serving as a trained medical scribe to document services personally performed by Barry Freeman MD based on the provider's statements to me on September 4, 2024.  This document has been  checked and approved by the attending provider.    I, Barry Freeman MD, was physically present and have reviewed and verified the accuracy of this note documented by Amaris Chanel, medical scribe.      Barry Freeman MD   [facility] EMERGENCY DEPARTMENT  9/4/2024           Barry Freeman MD  09/04/24 [id number]

## 2024-09-04 NOTE — TELEPHONE ENCOUNTER
Called and spoke with patient and relayed provider message.    She will call EMS for transport as she has no ride to the hospital.    Concerned it is documented that provider recommended ED which provider stated in triage response.    Christine M Klisch, RN

## 2024-09-04 NOTE — ED TRIAGE NOTES
BIBA from home c/o bilateral lower extremity edema. Does c/o of some SOB is on 6L NC on 3L NC at baseline. LS clear.

## 2024-09-04 NOTE — TELEPHONE ENCOUNTER
Reviewed RN note and strongly recommend patient goes to ED to seek care immediately.  Patient has not been seen in over 3 years and ED is most appropriate given the complexity and severity of symptoms reported.    Chiqui Gallegos, DNP, APRN, CNP

## 2024-09-04 NOTE — TELEPHONE ENCOUNTER
"Nurse Triage SBAR    Is this a 2nd Level Triage? YES, LICENSED PRACTITIONER REVIEW IS REQUIRED    Situation: Patient having multiple symptoms including severe abdominal/pelvic pain that has persisted for about a year. Please see assessment notes for further symptoms/description.    Background:   Patient reports symptoms started last summer, mid July    Assessment: Patient reports intermittent 10/10 abdominal/pelvic pain, shortness of breath that requires her to increase oxygen from 3L to 4.5L, she reports losing 150lbs over the past year related to nausea and abdominal pain, and notes leg/thigh swelling reporting thighs are swollen \"like 34 inches\" skin on lower leg is damp and she can barely move her legs. She also reports irregular vaginal bleeding. She takes 6 acetaminophen a day for the pain, they are 650mg pills.    Protocol Recommended Disposition:   Go to ED Now    Recommendation:   Gave care advice, recommended ED. Pt would like PCP to document it be medically necessary for pt to be seen in ED and have her team call and confirm they can do this before she goes so her insurance will cover the visit. She may also need EMS as she does not have a vehicle to take her to the hospital.    Routed to provider    Does the patient meet one of the following criteria for ADS visit consideration? 16+ years old, with an [facility] PCP     TIP  Providers, please consider if this condition is appropriate for management at one of our Acute and Diagnostic Services sites.     If patient is a good candidate, please use dotphrase <dot>triageresponse and select Refer to ADS to document.      Reason for Disposition   SEVERE abdominal pain (e.g., excruciating)    Additional Information   Negative: Passed out (i.e., fainted, collapsed and was not responding)   Negative: Shock suspected (e.g., cold/pale/clammy skin, too weak to stand, low BP, rapid pulse)   Negative: Sounds like a life-threatening emergency to the triager   Negative: " "Followed an abdomen (stomach) injury   Negative: Chest pain   Negative: Abdominal pain and pregnant < 20 weeks   Negative: Abdominal pain and pregnant 20 or more weeks   Negative: Pain is mainly in upper abdomen (if needed ask: 'is it mainly above the belly button?')   Negative: Abdomen bloating or swelling are main symptoms    Answer Assessment - Initial Assessment Questions  1. LOCATION: \"Where does it hurt?\"       Starts in lower back and migrates to the front \"around the bikini line from 1 hip to the other hip\"    2. RADIATION: \"Does the pain shoot anywhere else?\" (e.g., chest, back)      Just the pelvic region.    3. ONSET: \"When did the pain begin?\" (e.g., minutes, hours or days ago)       Started around 2023, mid July, started taking acetaminophen in August     4. SUDDEN: \"Gradual or sudden onset?\"      Gradual- started with nausea    5. PATTERN \"Does the pain come and go, or is it constant?\"     - If it comes and goes: \"How long does it last?\" \"Do you have pain now?\"      (Note: Comes and goes means the pain is intermittent. It goes away completely between bouts.)     - If constant: \"Is it getting better, staying the same, or getting worse?\"       (Note: Constant means the pain never goes away completely; most serious pain is constant and gets worse.)       Comes and goes, lasts 4-5 hours a day, sometimes will have 3-4 hours without pain, but otherwise does not go away just becomes more tolerable.    6. SEVERITY: \"How bad is the pain?\"  (e.g., Scale 1-10; mild, moderate, or severe)     - MILD (1-3): Doesn't interfere with normal activities, abdomen soft and not tender to touch.      - MODERATE (4-7): Interferes with normal activities or awakens from sleep, abdomen tender to touch.      - SEVERE (8-10): Excruciating pain, doubled over, unable to do any normal activities.        10/10 severe pain \"feels like having a baby every day\"    7. RECURRENT SYMPTOM: \"Have you ever had this type of stomach pain before?\" " "If Yes, ask: \"When was the last time?\" and \"What happened that time?\"       \"Only whenever I was young I had IBS stuff like that but not really\"    8. CAUSE: \"What do you think is causing the stomach pain?\"      Infection? Liver giving out? Old age?    9. RELIEVING/AGGRAVATING FACTORS: \"What makes it better or worse?\" (e.g., antacids, bending or twisting motion, bowel movement)      Relief: Acetaminophen 6/day 650mg pills. Low fiber foods.  Aggravating: high protein foods, if she eats hamburger meat,     10. OTHER SYMPTOMS: \"Do you have any other symptoms?\" (e.g., back pain, diarrhea, fever, urination pain, vomiting)        150 lbs weight loss in the last year, leg swelling, nausea , difficulty walking and moving, irregular vaginal bleeding, SOB    11. PREGNANCY: \"Is there any chance you are pregnant?\" \"When was your last menstrual period?\"        N/A no idea of LMP    Protocols used: Abdominal Pain - Female-A-[place]    "

## 2024-09-05 PROBLEM — I26.94 MULTIPLE SUBSEGMENTAL PULMONARY EMBOLI WITHOUT ACUTE COR PULMONALE (H): Status: ACTIVE | Noted: 2024-01-01

## 2024-09-05 PROBLEM — J96.21 ACUTE AND CHRONIC RESPIRATORY FAILURE WITH HYPOXIA (H): Status: ACTIVE | Noted: 2024-01-01

## 2024-09-05 NOTE — CONSULTS
Cardiology Inpatient Consultation  Date of Service: 09/05/2024  Patient: Sherwin Benites  MRN: [id number]  Admission Date: 9/4/2024  Hospital Day: 0            IMPRESSION     Bilateral segmental and subsegmental pulmonary emboli with right lower extremity deep venous thrombosis  Right ventricular dysfunction  Acute hypoxic respiratory failure  Underlying chronic lung disease (COPD, RICKY)  Morbid obesity  Severe chronic blood loss anemia  Cervical mass with concern for malignancy             ASSESSMENT AND PLAN     Patient with chronic underlying lung disease who presented with multiple complaints. She was found to have a new cervical mass concerning for malignancy with severe anemia secondary to chronic vaginal bleeding (presumably from the mass). She was noted to have bilateral segmental/subsegmental pulmonary emboli with associated RV dilation/dysfunction on TTE as well as respiratory failure requiring BiPAP. She has remained stable from symptom perspective and is hemodynamically stable on my assessment with BPs in the 120/50s. Given the relatively large PE burden with new RV dysfunction and respiratory failure, she technically does meet criteria for thrombectomy; however, as her emboli are relatively small and distal, it seems that this would not be a feasible procedure. I discussed the imaging with the interventional radiologist and he is in agreement that the emboli are too distal to perform a thrombectomy. Moreover, the patient would prefer not to undergo a procedure. Catheter-mediated lytic therapy is contraindicated with severe anemia with ongoing blood loss. As patient is hemodynamically stable at present, reasonable to continue anticoagulation with close monitoring.    Recommendations:   - Continue heparin gtt  - No procedures at this time. There are indications present supporting thrombectomy though is unlikely feasible with her clot distribution on my review of imaging and discussion with  interventional cardiologist. Catheter-mediated lytic therapy contraindicated with severe anemia and ongoing bleeding.  - Advise against diuresis at this time. CXR does not support pulmonary edema as a cause of her respiratory failure. Not overloaded by TTE. As she has mild RV failure with large clot burden, she is preload dependent and diuresis could possibly lead to hemodynamic decompensation.    Plan of care discussed with Dr. Santizo, who agrees with above plan.    Thank you for consulting the cardiovascular services at the [facility]. Please do not hesitate to call us with any questions.     Pedro Brooks MD  Cardiovascular Disease Fellow             HISTORY OF PRESENT ILLNESS     Sherwin Benites is a 68 year old female with PMHx of COPD, RICKY, and morbid obesity who presented with multiple complaints and was found to have severe chronic blood loss anemia, cervical mass suspicious for malignancy, and bilateral segmental/subsegmental pulmonary emboli. Cardiology is consulted due to concern for RV dysfunction on TTE.    Patient reports hat she has been feeling poorly for several months. She denies chest pain but has had worsening dyspnea and fatigue insidiously over the past 2-3 months with a more acute worsening over the past several days. She has not noted significant worsening of breathing  in the past several hours. She denies chest pain. She is not dizzy and has not had syncopal episodes. She feels like she is breathing better on BiPAP. She normally uses PAP therapy overnight but not during the day. She has a friend that had a thrombectomy and she does not want one.    At the time of interview, the patient denies chest pain, orthopnea, PND, palpitations, lightheadedness, or syncope.     Review of Systems:    Complete review of systems was performed and negative except per HPI.             CARDIAC HISTORY AND IMAGING     12-Lead EC24: NSR, LAE, LAD, LBBB    Transthoracic  Echocardiogram(s):  9/5/24: normal LV function, moderate RV dilation with mildly reduced function, flattened septum, IVC 3    Catheterization(s):   N/A    CMR and/or Additional Imaging  N/A            PAST MEDICAL HISTORY      Past Medical History:   Diagnosis Date    Arthritis ~2002, ~2015    thumbs, feet, knees, hips    COPD (chronic obstructive pulmonary disease) (H) Feb 2013    HN flu    Depressive disorder younger person    stay ok w/vit-min & good nutrition    Heart disease ~2004    1 heart attack then, another later.    Morbid obesity (H)     Pneumonia     Hx of multiple pneumonia    Premature infant     born 3 months early    Uncomplicated asthma baby    winter bronchitis in adulthood     Active Problems:  Patient Active Problem List    Diagnosis Date Noted    Acute and chronic respiratory failure with hypoxia (H) 09/05/2024     Priority: Medium    Multiple subsegmental pulmonary emboli without acute cor pulmonale (H) 09/05/2024     Priority: Medium    Poor dentition 02/02/2021     Priority: Medium    Other viral warts 02/02/2021     Priority: Medium    Hypoxemia 02/02/2021     Priority: Medium    Leg edema 07/18/2013     Priority: Medium    Supplemental oxygen dependent 03/26/2013     Priority: Medium    Obesity hypoventilation syndrome (H) 02/14/2013     Priority: Medium    H/O respiratory failure 02/14/2013     Priority: Medium    Sleep apnea 02/14/2013     Priority: Medium    Headaches, tension 02/14/2013     Priority: Medium     Suspect due to untreated sleep apnea, but unclear  Problem list name updated by automated process. Provider to review and confirm      CARDIOVASCULAR SCREENING; LDL GOAL LESS THAN 130 02/14/2013     Priority: Medium    Abnormal EKG 02/14/2013     Priority: Medium    Morbid obesity (H) 02/07/2013     Priority: Medium    Primary localized osteoarthrosis, lower leg 01/31/2012     Priority: Medium     Social History:  Social History     Tobacco Use    Smoking status: Never     Smokeless tobacco: Never   Substance Use Topics    Alcohol use: Never    Drug use: Never     Family History:  Family History   Problem Relation Age of Onset    C.A.D. Father     Cerebrovascular Disease Father         Due to surgery above.    Genitourinary Problems Father         CKD    Coronary Artery Disease Father         Carotid cleaned out    Substance Abuse Father         alcoholic    Alzheimer Disease Mother     Respiratory Mother         copd    Substance Abuse Mother         alcoholic    Unknown/Adopted Mother         age 79 bp Rx reaction    Unknown/Adopted Maternal Grandmother         age 34 childbirth    Cerebrovascular Disease Maternal Grandfather         age 82    Coronary Artery Disease Paternal Grandfather         age 55    Coronary Artery Disease Other         age 66 endocarditis    Coronary Artery Disease Other         age 71 myocarditis w/decompensation    Coronary Artery Disease Other         age 76 arteriosclerotic heart disease    Coronary Artery Disease Other         age 73 coronary sclerosis    Coronary Artery Disease Other         age 84 myocardian infarction    Coronary Artery Disease Other         age 61    Coronary Artery Disease Other         age 83    Hypertension Other         age 77    Cerebrovascular Disease Other         age 75    Cerebrovascular Disease Other         age 89    Cerebrovascular Disease Other         age 83 apoplexy    Other Cancer Other         89 unk cancer    Substance Abuse Other         alcoholic    Substance Abuse Other         alcoholic    Substance Abuse Other     Unknown/Adopted Other         age 22 childbirth    Unknown/Adopted Other         age 89 pneumonia    Unknown/Adopted Other         age 81 pneumonia    Unknown/Adopted Other         age 74 acute nephritis     Medications:  Current Facility-Administered Medications   Medication Dose Route Frequency Provider Last Rate Last Admin    multivitamin w/minerals (THERA-VIT-M) tablet 1 tablet  1 tablet Oral  Daily Raul Lundy MD        sodium chloride (PF) 0.9% PF flush 3 mL  3 mL Intracatheter Q8H Raul Lundy MD   3 mL at 09/05/24 0441     Current Facility-Administered Medications   Medication Dose Route Frequency Provider Last Rate Last Admin    heparin 25,000 units in 0.45% NaCl 250 mL ANTICOAGULANT infusion  0-5,000 Units/hr Intravenous Continuous Raul Lundy MD 19.5 mL/hr at 09/05/24 0906 1,950 Units/hr at 09/05/24 0906             PHYSICAL EXAM     Temp:  [97.5  F (36.4  C)-98.8  F (37.1  C)] 98.8  F (37.1  C)  Pulse:  [] 87  Resp:  [16-20] 16  BP: ()/(47-76) 122/55  FiO2 (%):  [40 %] 40 %  SpO2:  [77 %-100 %] 100 %    Intake/Output Summary (Last 24 hours) at 9/5/2024 1358  Last data filed at 9/5/2024 1000  Gross per 24 hour   Intake 1057.48 ml   Output 520 ml   Net 537.48 ml     GEN: Morbidly obese, NAD.  CV: RRR, normal S1, S2. No murmurs. JVP flat. No edema. Symmetric 2+ radial pulses.  Pulm: Clear bilaterally with normal WOB.  Abd: Nondistended.            DIAGNOSTICS     All labs and imaging were reviewed, of note:    CMP  Recent Labs   Lab 09/05/24  0858 09/04/24  1645    138   POTASSIUM 4.6 4.1   CHLORIDE 106 105   CO2 21* 19*   ANIONGAP 11 14   GLC 72 84   BUN 30.0* 32.9*   CR 1.22* 1.53*   GFRESTIMATED 48* 37*   HAYDEE 7.9* 8.2*   MAG  --  2.1   PROTTOTAL  --  6.0*   ALBUMIN  --  2.7*   BILITOTAL  --  0.3   ALKPHOS  --  126   AST  --  17   ALT  --  <5     CBC  Recent Labs   Lab 09/05/24  1255 09/05/24  0858 09/05/24  0402 09/04/24  1815   WBC  --  20.4*  --  17.9*   RBC  --  3.72*  --  2.63*   HGB 7.9* 8.0*  8.0* 6.8* 4.9*   HCT  --  28.8*  --  18.6*   MCV  --  77*  --  71*   MCH  --  21.8*  --  18.6*   MCHC  --  28.1*  --  26.3*   RDW  --  23.2*  --  22.5*   PLT  --  190  --  288     INRNo lab results found in last 7 days.  Arterial Blood Gas  Recent Labs   Lab 09/05/24  1255 09/05/24  1030   O2PER 40 40     Troponin  Lab Results   Component Value Date    CTROPT 36 (H)  09/05/2024    CTROPT 39 (H) 09/04/2024

## 2024-09-05 NOTE — PROGRESS NOTES
"  Admission          9/4/2024  4:07 PM  -----------------------------------------------------------  Reason for admission:  ailure to thrive and SOB and found to have, cervical mass and severe anemia.   Admitted from: ED  Via: transport   Accompanied by:Self  Belongings: remains with patient  Teaching: orientation to unit and call light- call light within reach.  Access: 2 right PIV  Telemetry:Placed on pt, shows SR  Ht./Wt.: complete  Code Status verified on armband: yes  2 RN Skin Assessment Completed By: still needs to be complete  Med Rec completed: no  Suction/Ambu bag/Flowmeter at bedside: yes  Is patient having diarrhea upon admission- if YES fill out testing algorithm : no    C. Diff Testing Algorithm (MUST be marked YES)   3 or more loose stools in 24 hrs. [] Yes [x] No       Additional symptoms:(At least ONE must be marked yes)   Abdominal pain/discomfort [] Yes [x] No   Fever at least 38C (100.4 F) [] Yes [x] No   Elevated WBC(>11,000) [] Yes [] No       Exclusion Criteria:  (MUST be marked YES)   Off laxatives for at least 48 hrs. [] Yes [] No       Pt status:    /57 (BP Location: Left arm)   Pulse 92   Temp 98.2  F (36.8  C) (Axillary)   Resp 18   Ht 1.676 m (5' 6\")   Wt 118.3 kg (260 lb 12.9 oz)   SpO2 96%   BMI 42.09 kg/m       Neuro: A&Ox4.   Cardiac: afebrile. OVSS.   Respiratory: Sating at 96%on 10 L of oxymask.  GI/: voiding via purewick .   Diet/appetite: clear liquid diet.   Activity:  Assist of 2. Bed alarm on.  Pain: At acceptable level on current regimen.   Skin: No new deficits noted.  LDA's: 2 right PIV.     Plan: pt refused double skin check. admission questions still needs to be complete. Continue with POC. Notify primary team with changes.   "

## 2024-09-05 NOTE — PROGRESS NOTES
NICU Progress Note    This is a  male infant born 2018  8:38 PM at Gestational Age: 36w5d now at age: 13 Days  Patient Active Problem List    Diagnosis Date Noted   • Respiratory distress syndrome in  2018     Priority: High   • Need for observation and evaluation of  for sepsis 2018     Priority: Low     Subjective: Baby remains on room air without alarms. Tolerating feeds, po feeding well.     Circ done 10/5    Objective:  Vitals Last Value 24-Hour Range   Temperature 98.9 °F (37.2 °C) (10/08/18 0830) Temp  Min: 98.1 °F (36.7 °C)  Max: 99 °F (37.2 °C)   Pulse (!) 177 (10/08/18 0830) Pulse  Min: 149  Max: 181   Respiratory 43 (10/08/18 0830) Resp  Min: 26  Max: 70   Non-Invasive Blood Pressure 87/51 (10/08/18 08) BP  Min: 73/41  Max: 87/51   Arterial Blood Pressure 53/30 (18 0900) No Data Recorded   Mean Arterial Pressure 61 (10/08/18 0830) MAP (mmHg)  Min: 50  Max: 61   Pulse Oximetry 100 % (10/03/18 1100) No Data Recorded     Vitals Today Admitted   Weight 3429 g (10/07/18 2030) Weight: 3190 g (18)   Height N/A Length: 20.5\" (52.1 cm) (18)   BMI N/A BMI (Calculated): 11.86 (18)     Weight over the past 48 hours:    Patient Vitals for the past 48 hrs:   Weight   10/06/18 2030 3349 g   10/07/18 2030 3429 g   Weight change: 80 g     Last Apnea:    none      Physical Exam:  Birthweight:  7 lb 0.5 oz (3190 g) with Weight change: 80 g 7% since birth  General: sleeping supine in open crib, NAD  Skin:  Pink, well perfused,  Anterior [place]:  Soft, flat. Tortle cap in place   Normal facies, no anomalies/bruising noted.    Lungs:  Clear to auscultation, no retractions  CV:  RRR with no murmur, 2+ femoral pulses  Abdomen:  Soft, no masses, non distended   male circumcised.   Normal tone and movement with exam    Fluids:  Date 10/07/18 0700 - 10/08/18 0659 10/08/18 0700 - 10/09/18 0659   Shift 4710-5866 [id number] 0554-9515 24 Hour Total  Brief hematology note    Called regarding low hgb to 4.9 ( last hgb from 2013)  in the setting of ongoing bleeding, unclear if vaginal or urinary source, alternates between spotting and increase size.     On further work up, FTH DVT with what appears with significant clot burden.     She is overall HDS ( HR 90s, -120s) except for increasing oxygen requirements from BL ( appears to be on 3 L at home ), currently at 6L.     ED team working on CTPE.     Recommendations:     Discussed with on call staff Dr. Grimes, since her bleeding appears to be more chronic in nature, it would be ok for her to get heparin gtt WITHOUT a bolus for her DVT and get transfuse to reach  hgb goal >7 while working up etiology of bleeding.     However if having significant bleeding, would favor transfusing, assess response and then consider heparin gtt.     Likewise, if significant bleeding happened while on heparin gtt, it needs it be turn off IMMEDIATELY and check hgb stat.     Please also try to obtain iron studies before transfusion.     Consider consult on AM if further questions.     Naty Durbin MD  PGY-4    [id number] 9600-0659 24 Hour Total   INTAKE   P.O.(mL/kg) 189(56.44) 92(26.83) 189(55.12) 470(137.07) 73(21.29)   73(21.29)   Shift Total(mL/kg) 189(56.44) 92(26.83) 189(55.12) 470(137.07) 73(21.29)   73(21.29)   OUTPUT   Drains 0   0         Tube Feeding Residual Discarded (mL) ([REMOVED] ROHITH/PED GI Tube 10/04/18 Nasogastric Right Nare) 0   0       Shift Total 0   0       Weight (kg) 3.35 3.43 3.43 3.43 3.43 3.43 3.43 3.43     Intake/Output       10/07 0700 - 10/08 0659 10/08 0700 - 10/09 0659    P.O. (mL/kg) 470 (137.07) 73 (21.29)    NG/GT (mL/kg)      Total Intake(mL/kg) 470 (137.07) 73 (21.29)    Urine (mL/kg/hr) 0 (0)     Drains 0     Stool 0     Total Output 0     Net +470 +73          Urine Occurrence 8 x 1 x    Stool Occurrence 7 x 1 x    Unmeasured Breast Milk Occurrence 1 x           Feeds: 63 cc q 3hrs Neosure 22 kcal TF: 148 cc/kg/d  100% PO    Residuals over last 24 hours:  No Data Recorded  Residuals over last 48 hours:  No Data Recorded    Last Void:  1 (10/08/18 0830) x 8  Last Stool:  1 (10/08/18 0830) x 7    Labs:    Recent Results (from the past 24 hour(s))   Hematocrit - Point of Care    Collection Time: 10/08/18  5:35 AM   Result Value Ref Range    HEMATOCRIT POC 41.0 (L) 42.0 - 66.0 %   Glucose - Point of Care    Collection Time: 10/08/18  5:35 AM   Result Value Ref Range    GLUCOSE POC 88 54 - 117 mg/dL   Hemoglobin - Point of Care    Collection Time: 10/08/18  5:35 AM   Result Value Ref Range    Hemoglobin POC 13.9 13.5 - 21.5 g/dL       Medications:  Current Facility-Administered Medications   Medication Dose Route Frequency Provider Last Rate Last Dose   • pediatric multivitamin with iron (POLY-VI-SOL WITH IRON) oral solution 0.5 mL  0.5 mL Oral Q24H Krystian Kuhn MD   0.5 mL at 10/07/18 1749   • acetaminophen (TYLENOL) suspension 40 mg  40 mg Oral Q6H PRN Krystian Kuhn MD   40 mg at 10/05/18 2030   • lidocaine HCl (XYLOCAINE) 1 % injection 4-10 mg  0.4-1 mL Subcutaneous PRN Krystian Kuhn,  MD   10 mg at 10/05/18 1121   • hepatitis B immune globulin (NABI-HB,HYPERHEP B) injection 0.5 mL  0.5 mL Intramuscular Once PRN Krystian Kuhn MD       • glycerin 99.5% 0.375 g  liquid  0.3 mL Rectal Q12H PRN Krystian Kuhn MD   0.375 g at 18 0895       Impression:   male infant at 36 5/7 weeks, now corrected to 38w 4d -   RDS now on RA, s/p surf x1  R/O sepsis was on amp/gent for 36 hrs  Feeding issues  Hyperbilirubinemia, slowly decreasing off phototherapy    Plan:  Resp:  RA- monitor.    CV:  Follow VS and BP.  FEN:  Continue current feeds- change to ad nayla without mins.  PO with cues.-160 cc/kg/d, encourage PO  ID:  Follow clinically now off Amp/Gent  Heme: Will follow clinically for jaundice.  Neuro- Follow clinically.  Circ done 10/5  I spoke to mom on the phone.     Current antibiotic status:  None       I saw and examined Willie Davis on 2018 at 10:42 AM and patient requires: NICU Intensive Care: Cardiac monitoring, Constant monitoring by health care team and Continuous monitoring of VS

## 2024-09-05 NOTE — PLAN OF CARE
Goal Outcome Evaluation:                        Problem: Adult Inpatient Plan of Care  Goal: Absence of Hospital-Acquired Illness or Injury  Intervention: Identify and Manage Fall Risk  Recent Flowsheet Documentation  Taken 9/5/2024 1200 by Nelda Taylor RN  Safety Promotion/Fall Prevention:   activity supervised   assistive device/personal items within reach   clutter free environment maintained   increase visualization of patient   lighting adjusted   mobility aid in reach   room near nurse's station   safety round/check completed   supervised activity   toileting scheduled  Taken 9/5/2024 0800 by Nelda Taylor RN  Safety Promotion/Fall Prevention:   activity supervised   assistive device/personal items within reach   clutter free environment maintained   increase visualization of patient   lighting adjusted   mobility aid in reach   room near nurse's station   safety round/check completed   supervised activity   toileting scheduled  Intervention: Prevent Skin Injury  Recent Flowsheet Documentation  Taken 9/5/2024 1200 by Nelda Taylor RN  Body Position: turned  Taken 9/5/2024 0800 by Nelda Taylor RN  Body Position: turned  Skin Protection: adhesive use limited  Device Skin Pressure Protection: absorbent pad utilized/changed  Intervention: Prevent and Manage VTE (Venous Thromboembolism) Risk  Recent Flowsheet Documentation  Taken 9/5/2024 0800 by Nelda Taylor RN  VTE Prevention/Management: other (see comments)  Intervention: Prevent Infection  Recent Flowsheet Documentation  Taken 9/5/2024 1200 by Nelda Taylor RN  Infection Prevention:   rest/sleep promoted   single patient room provided  Taken 9/5/2024 0800 by Nelda Taylor RN  Infection Prevention:   rest/sleep promoted   single patient room provided   Patient was on 10L oxymask for a short time this morning then Sherwin started complaining of sob and sats were upper 70's to mid 80's. RT paged and  rapid response team was called. Patient placed on the bipap at 40 percent fio2 and sats were upper 90's. Labs were drawn. Chest xray and echo done. Troponin 36. Hemoglobin 8 and vbg drawn etc. Heparin 10A 0.32 and no change to rate. Heparin infusing at 1950 units per hour and recheck around 4 pm. Strict NPO. Respiratory panel done. After a few hours on bipap we tried to trial the oxymask mask to give the patient a break from the bipap and sats drifted down to the low 80's so patient placed back on bipap. Assist x 2 with the gait belt and walker to the commode. No stool. Voided x 3+. Having a small amount of vaginal bleeding. Bed alarm on. Turning every few hours and pillows placed under legs and arms. Wife is here and is very supportive.

## 2024-09-05 NOTE — PROGRESS NOTES
Brief Progress Note    Gyn onc requested to assist with garner catheter placement as ordered by primary team after two failed attempts by bedside RN.     Garner catheter placed without issue. Clear urine in garner bag. UA to be collected by bedside RN.    Myron Ivey MD, MPH  Ob/Gyn Resident, PGY-4

## 2024-09-05 NOTE — CODE/RAPID RESPONSE
Rapid Response Team Note    Assessment   A rapid response was called on Sherwin Benites due to acute hypoxic respiratory failure and acute respiratory distress. Patient with increasing O2 needs and increased work of breath on 15L oxymask. This presentation is likely due to PE, concerned for possible submassive PE with significantly elevated NT-proBNP (though no signs on CT to suggest obvious right heart strain), but differential includes pulmonary edema, TACO/TRALI, and exacerbated by underlying co-morbidities including COPD (baseline 3L NC), and HFpEF (grade II diastolic dysfunction noted on prior TTE), and suspected pulmonary artery hypertension. Sepsis on differential with leukocytosis, but patient afebrile. No signs of PNA or infectious source on imaging. Low suspicion for infectious etiology at this time.     Plan   -  Restart BiPAP  -  stat CXR, ABG, lactic acid  -  stat EKG, troponin, and bedside TTE. If any signs of right heart strain with activate PERT, and discuss with cards/IR  -  stat BMP, CBC  -  HOLD lasix, as patient is pre-load dependent with PE and concern for R heart strain.   -  Agree with UA and stool studies with leukocytosis and reported diarrhea (already ordered) .   -  Continue heparin drip for now, unless patient develops significant vaginal bleeding will hold and talk to gyn/onc  -  The Internal Medicine primary team was  able to be reached and came to bedside.  -  Disposition: [place] [facility] [place]  -  Reassessment and plan follow-up will be performed by the primary team    Rowan Pimentel PA-C  Rapid Response Team [facility]  Securely message with [employer]     Medical Decision Making       30 MINUTES SPENT BY ME on the date of service doing chart review, history, exam, documentation & further activities per the note.      Sherwin is critically ill with acute hypoxic respiratory failure. These features are alarming and indicate that the patient is at imminent risk of  life-threatening organ failure. Acute deterioration is being managed by the following critical interventions: oxygen by BiPAP    Total Critical Care time spent by me, excluding procedures, was 30 minutes.    Hospital Course   Brief Summary of events leading to rapid response:   RRT was called for hypoxia and increased work of breathing despite being on 15L oxymask.     Sherwin Benites is a 68 year old female admitted on 9/4/2024. She has hx of chronic resp failure 2/2 COPD/RICKY, morbid obesity, chronic vaginal bleeding who presented to ED with failure to thrive picture and SOB and found to have acute on chronic hypoxic respiratory failure 2/2 DVT/PE without any signs of right heart strain on CT, cervical mass and severe anemia with hgb 4.9. Patient started on high intensity drip and now s/p 3 units of pRBC with repeat hgb 6.8. Per nursing only mild vaginal bleeding noted overnight.     Patient reports continued and unchanged SOB/CHEW since admission. Denies any F/C, CP, or cough.     Physical Exam   Vital Signs: Temp: 97.8  F (36.6  C) Temp src: Axillary BP: 108/49 Pulse: 92   Resp: 17 SpO2: 100 % O2 Device: BiPAP/CPAP Oxygen Delivery: 15 LPM  Weight: 260 lbs 12.87 oz      Exam:   GENERAL: Alert and awake. Answering questions appropriately. Ill appearing.   CARDIOVASCULAR: RRR. S1, S2. No murmurs, rubs, or gallops.   RESPIRATORY: Labored on 15L oxymask with accessory muscle use. Clear to auscultation bilaterally, no rales, rhonchi or wheezes.   EXTREMITIES: +1-2 pitting edema in LE  SKIN: Intact. Warm and dry. No jaundice. Pale.       Significant Results and Procedures   See chart for details.     Sepsis Evaluation   The patient is not known to have an infection.    NO EVIDENCE OF SEPSIS at this time.  Vital sign, physical exam, and lab findings are due to see above. .

## 2024-09-05 NOTE — PROGRESS NOTES
CLINICAL NUTRITION SERVICES - ASSESSMENT NOTE     Nutrition Prescription    RECOMMENDATIONS FOR MDs/PROVIDERS TO ORDER:  Recommend diet advancement to Regular once medically appropriate. Recommend advance to full liquid at this time as pt would like to try nutrition drink.  -Vocera texted primary team and they would assess diet advancement appropriateness pending BiPAP need. Passed along to bedside RN via [employer] text as well.    Malnutrition Status:    Severe malnutrition in the context of chronic illness    Recommendations already ordered by Registered Dietitian (RD):  Supplements:  -Tractive Strawberry - offer @ all meals (once diet advanced)    Thera-vit-M 1 tablet daily - suspect multiple nutrient deficiencies with severe wt loss and limited diet    Future/Additional Recommendations:  Monitor diet advancement and PO adequacy    If requires EN support, rec (pt voiced preference for organic nutrition drinks/products):  Tractive Peptide 1.5 (or equivalent) @ 45 mL/hr (1080 mL/day) + 1 pkt ProSource TF20 BID to provide 1821 kcal (25 kcal/kg), 120 g protein (1.6 g/kg), 149 g CHO, 16 g fiber, 83 g fat (40% from MCTs), and 756 mL free water daily    - Initiate @ 15 mL/hr and advance by 10 mL q8hr as tolerated to goal rate.   - Do not advance unless K+ >/= 3, Mg++ >/=1.5, and phos >/=1.9  - 30 mL q4hr fluid flushes for tube patency. Additional fluids and/or adjustments per MD.   - Multivitamin/mineral (15 mL/day via FT) to help ensure micronutrient needs being met with suspected hypermetabolic demands and potential interruptions to TF infusions.  - If gastric access: HOB >30 degrees.  If proning with gastric feeds, place pt in Reverse Trendelenburg position of 10-25 degrees per ASPEN guidelines.       REASON FOR ASSESSMENT  Sherwin Benites is a/an 68 year old female assessed by the dietitian for Provider Order - weight loss    NUTRITION HISTORY  Pt not previously known to Clinical Nutrition.    No food allergies  "noted.    Met with pt at bedside. Pt on BiPAP and having wraps placed on BLE for edema, but wanted to speak with writer. She was having periods of pain during wrap placement making conversation a bit challenging. She reports wt loss started about a year ago when she began having abdominal cramping/pain and diarrhea after eating. She reports fear with eating because of the pain and eating much less than 50% of her normal PO with these symptoms.     CURRENT NUTRITION ORDERS  Diet: Clear Liquid  Intake/Tolerance: Pt reports feeling thirsty but also hesitant/fearful of PO.     LABS  Labs reviewed  Electrolytes  Potassium (mmol/L)   Date Value   09/04/2024 4.1   02/19/2021 4.2   07/09/2013 3.8   02/08/2013 3.9    Blood Glucose  Glucose (mg/dL)   Date Value   09/04/2024 84   02/19/2021 87   07/09/2013 106 (H)   02/08/2013 135 (H)   02/07/2013 97    Inflammatory Markers  WBC (10e9/L)   Date Value   07/09/2013 8.2   02/08/2013 4.3   02/07/2013 5.4     WBC Count (10e3/uL)   Date Value   09/04/2024 17.9 (H)     Albumin (g/dL)   Date Value   09/04/2024 2.7 (L)   02/07/2013 4.0      Magnesium (mg/dL)   Date Value   09/04/2024 2.1   02/08/2013 2.1     Sodium (mmol/L)   Date Value   09/04/2024 138   02/19/2021 139   07/09/2013 141   02/08/2013 144    Renal  Urea Nitrogen (mg/dL)   Date Value   09/04/2024 32.9 (H)   02/19/2021 13   07/09/2013 14   02/08/2013 14     Creatinine (mg/dL)   Date Value   09/04/2024 1.53 (H)   02/19/2021 0.75   07/09/2013 0.70   02/08/2013 0.67     Additional  Triglycerides (mg/dL)   Date Value   02/19/2021 118   07/09/2013 109         MEDICATIONS  Medications reviewed    ANTHROPOMETRICS  Height: 167.6 cm (5' 6\")  Most Recent Weight: 118.3 kg (260 lb 12.9 oz)    IBW: 59.1 kg   BMI: 42.09 kg/m2; Obesity Grade III BMI >40   Weight History: No recent wt hx on file. Pt reports at least 150 lbs wt loss over the past year (at least 35% wt loss over past year per pt's report).  Wt Readings from Last 20 " Encounters:   09/05/24 118.3 kg (260 lb 12.9 oz)   02/19/21 (!) 164.2 kg (362 lb)   12/17/13 (!) 154 kg (339 lb 6.4 oz)   07/16/13 (!) 158.8 kg (350 lb)   07/10/13 (!) 154.7 kg (341 lb)   07/02/13 (!) 156.5 kg (345 lb)   06/18/13 (!) 156.5 kg (345 lb)   06/11/13 (!) 155.1 kg (342 lb)   04/25/13 (!) 151 kg (333 lb)   04/05/13 (!) 153.8 kg (339 lb)   04/02/13 (!) 153.6 kg (338 lb 9.6 oz)   03/26/13 (!) 157.4 kg (347 lb)   03/19/13 (!) 158.1 kg (348 lb 9.6 oz)   03/08/13 (!) 159.2 kg (351 lb)   02/22/13 (!) 159.2 kg (351 lb)   02/14/13 (!) 158.3 kg (349 lb)   02/09/13 (!) 157.4 kg (347 lb)   02/07/13 (!) 159.2 kg (351 lb)   01/31/12 (!) 159.2 kg (351 lb)   02/22/08 131.6 kg (290 lb 1.6 oz)     Dosing Weight: 74 kg (adjusted, based on admit wt of 118.3 kg on 9/5 and IBW of 59.1 kg)    ASSESSED NUTRITION NEEDS  Estimated Energy Needs: 4809-0600 kcals/day (20 - 25 kcals/kg)  Justification: Obese  Estimated Protein Needs: 111-148 grams protein/day (1.5 - 2 grams of pro/kg)  Justification: Obesity guidelines  Estimated Fluid Needs: 1 mL/kcal   Justification: Maintenance or Per provider pending fluid status    PHYSICAL FINDINGS  See malnutrition section below.  -BiPAP  -Pallor  -Skin appears thin/fragile    MALNUTRITION  % Intake: </= 50% for >/= 5 days (severe)  % Weight Loss: > 20% in 1 year (severe)  Subcutaneous Fat Loss: Facial region:  Moderate, Upper arm:  Moderate, and Lower arm:  Moderate  Muscle Loss: Upper arm (bicep, tricep):  Moderate, Lower arm  (forearm):  Moderate, and Calf: Mild-moderate per visual prior to wraps - difficult to fully assess with body habitus  Fluid Accumulation/Edema: Moderate (3+ BLE edema per H&P)  Malnutrition Diagnosis: Severe malnutrition in the context of chronic illness    NUTRITION DIAGNOSIS  Inadequate oral intake related to abdominal pain and diarrhea as evidenced by pt report of these symptoms for at least the past year leading to eating much less than 50% of usual PO and wt loss  of at least 150 lbs with signs of moderate muscle and subcutaneous adipose wasting.      INTERVENTIONS  Implementation  -Nutrition Education: Provided education on RD role. Pt reports fear of eating, asking writer what she should try - encouraged bland foods like broth, toast, banana, eggs, yogurt when first starting to eat. Encouraged trying nutrition drinks, she is most interested in TeeBeeDee due to it being organic.  -Medical food supplement therapy     Goals  Diet adv > CLD v nutrition support within 2-3 days.     Monitoring/Evaluation  Progress toward goals will be monitored and evaluated per protocol.  Abigail Contreras RD, LD  Available on [employer]Like - can search by name or unit Dietitian  **Clinical Nutrition is no longer available via pager

## 2024-09-05 NOTE — PROGRESS NOTES
[facility]    Medicine Progress Note - Hospitalist Service, GOLD TEAM 1    Date of Admission:  9/4/2024    Assessment & Plan   Sherwin Benites is a 68 year old female admitted on 9/4/2024. She has hx of chronic resp failure 2/2 COPD/RICKY, morbid obesity, chronic vaginal bleeding who presented to ED with failure to thrive picture and SOB and found to have DVT/PE, cervical mass and severe anemia.      # Bilateral segmental and subsegmental PE  # RLE DVT  # Acute on chronic respiratory failure   # Hx of RICKY on home nocturnal bipap  # Hx of COPD on 3L oxygen at baseline  - Pt presented with SOB, found to be hypoxic by paramedics, CT PE revealed b/l PE, no CT e/o right heart strain, however, does have elevated BNP. ED contacted Hematology, ok to continue heparin gtt without bolus, transfuse and monitor   - 9/5/2024 patient's respiratory status worsened requiring BiPAP. Rapid called. CXR with mild increase in suprahilar streaky opacities and bronchial wall thickening. White count is up to 20.4. VBG 7.29/48. Troponin is 36. Stat echo with flattened septum, right ventricular pressure, moderate right ventricular dilation, mild reduction in ventricular function. Per cards fellow IVF fairly flat. Lactic acid wnl.   - etiology may be multifactorial. PE may be contributing but overall clot burden not that impressive so unclear if worsening respiratory status is attributed solely to PE. No wheezing or concern for COPD exacerbation. Low concern for infection at this time with suspicion that leukocytosis is likely reacted. TACO is a possibility given recent transfusions. However per CARDS fellow IVC is flat. Need to be careful with diuresis as she is preload dependent with PE.   - Of note, PERT team was activated in the ED. No indication for TPA or embolectomy at this time.   Plan  - continue heparin drip -> high intensity without bolus  - hematology consulted given complex bleeding and  clotting picture  - continue BiPAP for now   - check pro-calcitonin   - upgrade status to IMC     # Acute on chronic blood loss anemia 2/2 vaginal bleeding   - Hgb in 4's in the ED, likely due to chronic vaginal bleeding.   - S/p 3U PRBCS so far and responding appropriately  Plan  - monitor hemoglobin every 6 hours  - hematology consult as above    # Cervical mass concerning for malignancy  # Chronic vaginal bleeding   # suspicious left supraclavicular, lower right para-aortic and left retrocaval regoin adenopathy that is concerning for malignancy   - Pt also c/o abdominal pain and given severity of anemia CT A/P was obtained which revealed large cervical mass. GynOnc recommended medicine admission given medical complexity   Plan  - Gyn-onc following: plan for speculum exam with bx      # Generalized weakness  # Failure to thrive   # Weight loss   # B/L LE edema   # Morbid obesity   - Symptoms likey 2/2 cancer and anemia   - Will consult Nutrition, PT/OT  - Consult Lymphedema      # MEENAKSHI vs CKD  - Pt's Cr is 1.53, most recent is 0.75 in 2021.   - improved somewhat with fluids  Plan  - continue to monitor      # Hx of COPD/RICKY  # Report of distant Heart attack   - Pt reports having heart attack in 2004 but not any any meds. She is on oxygen during the day and BiPAP at night   - Cont to monitor   - Cont PTA nocturnal BiPAP    # Leukocytosis   - possibly reactive   Plan  - C diff pending due to report of diarrhea   - UA is pending  - procal pending  - low threshold for antibiotics if decompensates further                Diet: Combination Diet Clear Liquid    DVT Prophylaxis: heparin drip  Miller Catheter: Not present  Lines: None     Cardiac Monitoring: ACTIVE order. Indication: PE  Code Status: Full Code      Clinically Significant Risk Factors Present on Admission              # Hypoalbuminemia: Lowest albumin = 2.7 g/dL at 9/4/2024  4:45 PM, will monitor as appropriate        # Anemia: based on hgb <11       # Severe  "Obesity: Estimated body mass index is 42.09 kg/m  as calculated from the following:    Height as of this encounter: 1.676 m (5' 6\").    Weight as of this encounter: 118.3 kg (260 lb 12.9 oz).                    Disposition Plan     Medically Ready for Discharge: 2-4 days           The patient's care was discussed with the attending    Cheikh Hammer PA-C  Hospitalist Service, GOLD TEAM 1  [facility]  Securely message with [employer] (more info)  Text page via [employer] Paging/Directory   See signed in provider for up to date coverage information  ______________________________________________________________________    Interval History   See above    Physical Exam   Vital Signs: Temp: 98.2  F (36.8  C) Temp src: Axillary BP: 126/57 Pulse: 92   Resp: 18 SpO2: 96 % O2 Device: Oxymask Oxygen Delivery: 10 LPM  Weight: 260 lbs 12.87 oz    General Appearance: mild respiratory distress  HEENT: Anicteric sclera, MMM   Respiratory: Lungs CTAB, No wheezing , no crackles, + Tachypneic   Cardiovascular: Regular rate , Regular rhythm , no murmur   GI: Abdomen soft, non-tender, active bowel sounds. No guarding or rebound  Skin: No rash or jaundice on exposed skin   Musculoskeletal: No lower extremity edema   Neurologic: Oriented X 3 , CN II-XII grossly intact, Moves extremities equally       Medical Decision Making       60 MINUTES SPENT BY ME on the date of service doing chart review, history, exam, documentation & further activities per the note.      Data   ------------------------- PAST 24 HR DATA REVIEWED -----------------------------------------------  "

## 2024-09-05 NOTE — PROVIDER NOTIFICATION
09/05/24 0900   Call Information   Date of Call 09/05/24   Time of Call 0837   Name of person requesting the team Nelda GARCIA   Title of person requesting team RN   RRT Arrival time 0840   Time RRT ended 0912   Reason for call   Type of RRT Adult   Primary reason for call Respiratory   Respiratory Labored breathing;O2sat less than 88% for greater than 5 minutes despite O2   Was patient transferred from the ED, ICU, or PACU within last 24 hours prior to RRT call? No   SBAR   Situation RRT called for increased RR and work of breathing. O2 sats drop to 85% while on oxymask while up and moving. Hgb has been low but 3 units pRBC given (labs pending).   Background per provider note admitted on 9/4/2024. She has hx of chronic resp failure 2/2 COPD/RICKY, morbid obesity, chronic vaginal bleeding who presented to ED with failure to thrive picture and SOB and found to have DVT/PE, cervical mass and severe anemia.   Notable History/Conditions COPD;Cardiac   Assessment pt sating low 90s. A&Ox4. Pt then placed on Bipap FiO2 40%. Hgb 8.0 on re-check.   Interventions CXR;ECG;Labs;Other (describe)  (ECHO and BIpap)   Adjustments to Recommend NA   Patient Outcome   Patient Outcome Stabilized on unit  (made IMC status but will remain on 5C)   RRT Team   Attending/Primary/Covering Physician Gold 1   Date Attending Physician notified 09/05/24   Time Attending Physician notified 0837   Physician(s) Rowan Pimentel PA-C   Lead RN Aftab Ant   RN Rhonda Winchester   Post RRT Intervention Assessment   Post RRT Assessment Stable/Improved   Date Follow Up Done 09/05/24   Time Follow Up Done 1145   Comments VBG scheduled around noon. pt remains on Bipapa. VSS. A&O x4

## 2024-09-05 NOTE — CONSULTS
"    Gynecologic Oncology Consult Note    Name: Sherwin Benites    MRN: [id number]   YOB: 1955         We were asked to see Sherwin Benites at the request of Dr. Freeman for CT findings concerning for cervical malignancy.          Chief Complaint:   Leg swelling    History is obtained from the patient          History of Present Illness:   Sherwin Benites is a 68 year old  female who is being seen for evaluation of imaging concerning for cervical malignancy. She presented to the ED today for various acute on chronic issue including: weight loss, fatigue, leg swelling abdominal pain. She feels these symptoms and being \"sick\" started about a year ago and have worsened until today she knew she needed to be in the hospital.  Specifically, she reports nausea that began a year ago and progressed to pain with eating. She then developed pain that would precede bowel movements. The pain was low back pain or low abdominal pain \"along the bikini line\" and once it started it was unrelenting and would not go away for long times. She often takes 4 pills of tylenol q4h to address this pain. She states somewhere along this course she began avoiding certain foods and limiting PO, leading to a significant weight loss and onset of fatigue that precluded more and more activities. She also has leg swelling accompanied by sometimes her lower legs being damp, as if the swelling is leaking out.    She also reports a long history of vaginal bleeding, possibly for over a decade. She denies menses in this time as she reports her vaginal bleeding was nearly daily and was not cyclical. She states she saw an OBGYN over 10 years ago (exact timing uncertain) and that they trialed her on hormones which caused her to gain significant weight so she discontinued them. She thinks that maybe biopsies were done at that time but does not recall what the result was, does not remember being told of any abnormality. She believes she received " pap smears throughout her life and does not recall any abnormal results. Per chart review she did have a pap smear (NILM, HPV ne, biopsy of a cervical polyp (benign) and an endometrial bisopy (benign with simple hyperplasia) in 2013.     Denies vomiting, fevers, chills, chest pain, SOB, dysuria, changes in vaginal discharge, stool or flatus per vagina, or any other complaints at this time.            Past Medical History:   Gyn Hx:   - Menopause: uncertain given consistent noncyclical bleeding at least through her late 50s to 60s  - Last pap:  per chart review  - Abnormal paps: no known    OB Hx: ? - vaginal delivery    Gender: patient self reports prior test revealing both XX and XY chromosomes in their DNA; may have been first caught on [email or web address] commercial test but thinks it was confirmed in other tests, unable to confirm on chart review  Ask preferred pronouns/gender identity tomorrow     Past Medical History:   Diagnosis Date    Arthritis ~, ~    thumbs, feet, knees, hips    COPD (chronic obstructive pulmonary disease) (H) 2013    HN flu    Depressive disorder younger person    stay ok w/vit-min & good nutrition    Heart disease ~    1 heart attack then, another later.    Morbid obesity (H)     Pneumonia     Hx of multiple pneumonia    Premature infant     born 3 months early    Uncomplicated asthma baby    winter bronchitis in adulthood            Past Surgical History:     Past Surgical History:   Procedure Laterality Date    BREAST BIOPSY, RT/LT      Right    BREAST SURGERY  ~?    biopsy only -neg    CHOLECYSTECTOMY              Social History:     Social History     Tobacco Use    Smoking status: Never    Smokeless tobacco: Never   Substance Use Topics    Alcohol use: Never            Family History:     Family History   Problem Relation Age of Onset    C.A.D. Father     Cerebrovascular Disease Father         Due to surgery above.    Genitourinary Problems Father          CKD    Coronary Artery Disease Father         Carotid cleaned out    Substance Abuse Father         alcoholic    Alzheimer Disease Mother     Respiratory Mother         copd    Substance Abuse Mother         alcoholic    Unknown/Adopted Mother         age 79 bp Rx reaction    Unknown/Adopted Maternal Grandmother         age 34 childbirth    Cerebrovascular Disease Maternal Grandfather         age 82    Coronary Artery Disease Paternal Grandfather         age 55    Coronary Artery Disease Other         age 66 endocarditis    Coronary Artery Disease Other         age 71 myocarditis w/decompensation    Coronary Artery Disease Other         age 76 arteriosclerotic heart disease    Coronary Artery Disease Other         age 73 coronary sclerosis    Coronary Artery Disease Other         age 84 myocardian infarction    Coronary Artery Disease Other         age 61    Coronary Artery Disease Other         age 83    Hypertension Other         age 77    Cerebrovascular Disease Other         age 75    Cerebrovascular Disease Other         age 89    Cerebrovascular Disease Other         age 83 apoplexy    Other Cancer Other         89 unk cancer    Substance Abuse Other         alcoholic    Substance Abuse Other         alcoholic    Substance Abuse Other     Unknown/Adopted Other         age 22 childbirth    Unknown/Adopted Other         age 89 pneumonia    Unknown/Adopted Other         age 81 pneumonia    Unknown/Adopted Other         age 74 acute nephritis     No knowledge of any cancer in primary relatives. Also denies knowledge of cancer in more distant relatives but says she was not specifically paying attention to this ever.           Allergies:     Allergies   Allergen Reactions    Echinacea Difficulty breathing    Latex     Penicillins      Can't tolerate the smell of the tablet            Medications:     Current Facility-Administered Medications   Medication Dose Route Frequency Provider Last Rate Last Admin    heparin  25,000 units in 0.45% NaCl 250 mL ANTICOAGULANT infusion  0-5,000 Units/hr Intravenous Continuous Barry Freeman MD 18 mL/hr at 09/04/24 2301 1,800 Units/hr at 09/04/24 2301     Current Outpatient Medications   Medication Sig Dispense Refill    ORDER FOR DME Please use BiPAP as directed by your sleep provider.  .                Review of Systems:    ROS: 10 point ROS negative other than those noted above in the HPI.       Physical Exam:     Vitals:    09/04/24 2300 09/04/24 2330 09/04/24 2353 09/05/24 0008   BP: 128/63 117/57 115/53 102/52   Pulse: 95 95 92 96   Resp:  18 18 18   Temp:  98.3  F (36.8  C) 98  F (36.7  C) 98.6  F (37  C)   TempSrc:       SpO2: 96% 94%  95%     General: NAD, sick appearing  Resp: visibly increased work of breathing on supplemental oxygen, CPAP in room  CV: pale appearing  Abdomen: soft, non distended, tender. No organomegaly  : normal external genitalia; bimanual exam can palpate coarse mass protruding from cervix; SSE significantly limited by bed and habitus but visualized blood in the vault, cleared with proctoswab without ongoing bleeding visualized, portion of mass seen from cervix but largely unable to visualize  Ext: Warm, well perfused, 3+ severe pitting edema to hips; excess skin on legs limiting mobility      Data     Results for orders placed or performed during the hospital encounter of 09/04/24 (from the past 24 hour(s))   POC US ECHO LIMITED    Impression    Limited Bedside Cardiac Ultrasound, performed and interpreted by me.   Indication: Shortness of Breath.  Parasternal long axis, parasternal short axis, apical 4 chamber, and lung views were acquired.   Difficult views due to habitus    Findings:    Left ventricle appears to be with good ejection fraction.  No large pericardial effusion.  No B-lines bilaterally.    IMPRESSION: Appears to have grossly normal left ventricular function..       Comprehensive metabolic panel   Result Value Ref Range    Sodium 138  135 - 145 mmol/L    Potassium 4.1 3.4 - 5.3 mmol/L    Carbon Dioxide (CO2) 19 (L) 22 - 29 mmol/L    Anion Gap 14 7 - 15 mmol/L    Urea Nitrogen 32.9 (H) 8.0 - 23.0 mg/dL    Creatinine 1.53 (H) 0.51 - 0.95 mg/dL    GFR Estimate 37 (L) >60 mL/min/1.73m2    Calcium 8.2 (L) 8.8 - 10.4 mg/dL    Chloride 105 98 - 107 mmol/L    Glucose 84 70 - 99 mg/dL    Alkaline Phosphatase 126 40 - 150 U/L    AST 17 0 - 45 U/L    ALT <5 0 - 50 U/L    Protein Total 6.0 (L) 6.4 - 8.3 g/dL    Albumin 2.7 (L) 3.5 - 5.2 g/dL    Bilirubin Total 0.3 <=1.2 mg/dL   Troponin T, High Sensitivity   Result Value Ref Range    Troponin T, High Sensitivity 39 (H) <=14 ng/L   Lactic acid whole blood with 1x repeat in 2 hr when >2   Result Value Ref Range    Lactic Acid, Initial 1.2 0.7 - 2.0 mmol/L   D dimer quantitative   Result Value Ref Range    D-Dimer Quantitative 2.91 (H) 0.00 - 0.50 ug/mL FEU    Narrative    This D-dimer assay is intended for use in conjunction with a clinical pretest probability assessment model to exclude pulmonary embolism (PE) and deep venous thrombosis (DVT) in outpatients suspected of PE or DVT. The cut-off value is 0.50 ug/mL FEU.    For patients 50 years of age or older, the application of age-adjusted cut-off values for D-Dimer may increase the specificity without significant effect on sensitivity. The literature suggested calculation age adjusted cut-off in ug/L = age in years x 10 ug/L. The results in this laboratory are reported as ug/mL rather than ug/L. The calculation for age adjusted cut off in ug/mL= age in years x 0.01 ug/mL. For example, the cut off for a 76 year old male is 76 x 0.01 ug/mL = 0.76 ug/mL (760 ug/L).    M Ainbal et al. Age adjusted D-dimer cut-off levels to rule out pulmonary embolism: The ADJUST-PE Study. BARBARA 2014;311:2971-9952.; HJ Rebecca et al. Diagnostic accuracy of conventional or age adjusted D-dimer cutoff values in older patients with suspected venous thromboembolism. Systemic review  and meta-analysis. BMJ 2013:346:f2492.   Nt probnp inpatient (BNP)   Result Value Ref Range    N terminal Pro BNP Inpatient 23,140 (H) 0 - 900 pg/mL   Lipase   Result Value Ref Range    Lipase 14 13 - 60 U/L   TSH with free T4 reflex   Result Value Ref Range    TSH 4.65 (H) 0.30 - 4.20 uIU/mL   Magnesium   Result Value Ref Range    Magnesium 2.1 1.7 - 2.3 mg/dL   T4 free   Result Value Ref Range    Free T4 1.15 0.90 - 1.70 ng/dL   Iron and iron binding capacity   Result Value Ref Range    Iron 9 (L) 37 - 145 ug/dL    Iron Binding Capacity 248 240 - 430 ug/dL    Iron Sat Index 4 (L) 15 - 46 %   Extra Tube ([facility] Draw)    Narrative    The following orders were created for panel order Extra Tube ([facility] Draw).  Procedure                               Abnormality         Status                     ---------                               -----------         ------                     Extra Red Top Tube[[id number]]                                                          Extra Blood Bank Purple ...[[id number]]                                                   Please view results for these tests on the individual orders.   XR Chest 2 Views    Narrative    Exam: XR CHEST 2 VIEWS, 9/4/2024 5:19 PM    Comparison: CT chest 4/19/2013    History: sob    Findings:  AP and lateral views of the chest.. Trachea is midline. Overall heart  size is within normal limits. Prominent pulmonary artery shadow. No  focal airspace opacity. No pneumothorax. The right costophrenic angle  is sharp. The left costophrenic angle is out of the field-of-view. The  visualized upper abdomen is unremarkable. No acute osseous  abnormalities. Degenerative changes of the shoulders.      Impression    Impression:   1. No acute airspace abnormality.  2. Prominent pulmonary artery shadow, can be seen with pulmonary  hypertension.    I have personally reviewed the examination and initial interpretation  and I agree with the findings.    JANELL ARAMBULA MD          SYSTEM ID:  [id number]   CBC with platelets differential    Narrative    The following orders were created for panel order CBC with platelets differential.  Procedure                               Abnormality         Status                     ---------                               -----------         ------                     CBC with platelets and d...[[id number]]  Abnormal            Final result                 Please view results for these tests on the individual orders.   CBC with platelets and differential   Result Value Ref Range    WBC Count 17.9 (H) 4.0 - 11.0 10e3/uL    RBC Count 2.63 (L) 3.80 - 5.20 10e6/uL    Hemoglobin 4.9 (LL) 11.7 - 15.7 g/dL    Hematocrit 18.6 (L) 35.0 - 47.0 %    MCV 71 (L) 78 - 100 fL    MCH 18.6 (L) 26.5 - 33.0 pg    MCHC 26.3 (L) 31.5 - 36.5 g/dL    RDW 22.5 (H) 10.0 - 15.0 %    Platelet Count 288 150 - 450 10e3/uL    % Neutrophils 85 %    % Lymphocytes 8 %    % Monocytes 6 %    % Eosinophils 0 %    % Basophils 0 %    % Immature Granulocytes 1 %    NRBCs per 100 WBC 0 <1 /100    Absolute Neutrophils 15.1 (H) 1.6 - 8.3 10e3/uL    Absolute Lymphocytes 1.4 0.8 - 5.3 10e3/uL    Absolute Monocytes 1.2 0.0 - 1.3 10e3/uL    Absolute Eosinophils 0.1 0.0 - 0.7 10e3/uL    Absolute Basophils 0.0 0.0 - 0.2 10e3/uL    Absolute Immature Granulocytes 0.2 <=0.4 10e3/uL    Absolute NRBCs 0.0 10e3/uL   ABO/Rh type and screen    Narrative    The following orders were created for panel order ABO/Rh type and screen.  Procedure                               Abnormality         Status                     ---------                               -----------         ------                     Adult Type and Screen[[id number]]                            Final result                 Please view results for these tests on the individual orders.   Adult Type and Screen   Result Value Ref Range    ABO/RH(D) A POS     Antibody Screen Negative Negative    SPECIMEN EXPIRATION DATE [id number]      Lower Extremity Venous Duplex Bilateral   Result Value Ref Range    Radiologist flags Deep vein thrombosis (Urgent)     Narrative    EXAMINATION: DOPPLER VENOUS ULTRASOUND OF BILATERAL LOWER EXTREMITIES,  9/4/2024 6:26 PM     COMPARISON: 6/18/2013    HISTORY: lower extremity edema    TECHNIQUE:  Gray-scale evaluation with compression, spectral flow and  color Doppler assessment of the deep venous system of both legs from  groin to knee, and then at the ankles.    FINDINGS:  Right:   Partial compressibility of the right CFV. No compressibility of the  popliteal vein, posterior tibial vein. The GSV is fully compressible.    Left:  Patent left CFV, profundus, femoral vein, popliteal vein, and  posterior tibial vein.       Impression    IMPRESSION:  Partially occlusive deep vein thrombosis of the right CFV. Fully  occlusive deep vein thrombosis of the right popliteal and posterior  tibial vein.      [Urgent Result: Deep vein thrombosis]    Finding was identified on 9/4/2024 6:27 PM.     Dr. Freeman was contacted by Dr. Agustin Whaley at 9/4/2024 6:34 PM and  verbalized understanding of the urgent finding.     I have personally reviewed the examination and initial interpretation  and I agree with the findings.    JANELL ARAMBULA MD         SYSTEM ID:  [id number]   Prepare red blood cells (unit)   Result Value Ref Range    Blood Component Type Red Blood Cells     Product Code P1059Z99     Unit Status Transfused     Unit Number [id number]     CROSSMATCH Compatible     CODING SYSTEM [id number]     ISSUE DATE AND TIME [id number]     UNIT ABO/RH A+     UNIT TYPE ISBT 6200    Prepare red blood cells (unit)   Result Value Ref Range    Blood Component Type Red Blood Cells     Product Code [id number]     Unit Status Transfused     Unit Number [id number]     CROSSMATCH Compatible     CODING SYSTEM [id number]     ISSUE DATE AND TIME [id number]     UNIT ABO/RH A+     UNIT TYPE ISBT 6200    CT Abdomen Pelvis w Contrast    Narrative     EXAM: CT ABDOMEN PELVIS W CONTRAST  LOCATION: [facility]  DATE: 9/4/2024    INDICATION: low hgb, abdominal pain  COMPARISON: Same day bilateral lower extremity ultrasound, Pelvic ultrasound 6/26/2013  TECHNIQUE: CT scan of the abdomen and pelvis was performed following injection of IV contrast. Multiplanar reformats were obtained. Dose reduction techniques were used.  CONTRAST: 135ml isovue 370    FINDINGS:   LOWER CHEST: Please see report from concurrent chest CT for findings above the diaphragm.    HEPATOBILIARY: Cholecystectomy. No evidence of biliary obstruction. Perfusional changes adjacent to falciform. No suspicious liver lesion visualized.    PANCREAS: Normal.    SPLEEN: Normal.    ADRENAL GLANDS: Normal.    KIDNEYS/BLADDER: Normal.    BOWEL: No hydronephrosis. Urinary bladder is unremarkable.    LYMPH NODES: Multiple enlarged retroperitoneal lymph nodes with retrocaval mass measuring 4.1 x 3.4 cm (series 4 image 97).    VASCULATURE: Filling defect noted in the right common femoral vein (series 4 image 331) likely correspond to findings on same day venous ultrasound..    PELVIC ORGANS: Likely irregular mass in the cervix measuring up to 8.2 cm (series 7 image 78). Possible superimposed uterine fibroids.    MUSCULOSKELETAL: No acute bony abnormality or suspicious osseous lesions. Mild body wall edema.      Impression    IMPRESSION:   1.  Likely large cervical mass suspicious for primary neoplasm.  2.  Enlarged retroperitoneal lymph nodes, suspicious for disease involvement.  3.  Deep vein thrombus in the right common femoral vein, as seen on same day vascular ultrasound.    Findings discussed with Dr. Freeman at 9:42 PM on 9/4/2024 by Dr. Gutierrez   CT Chest Pulmonary Embolism w Contrast   Result Value Ref Range    Radiologist flags (AA)      New diagnosis of pulmonary embolism and presumed metastatic adenopathy.    Narrative    EXAM: CT CHEST PULMONARY EMBOLISM  W CONTRAST  LOCATION: [facility]  DATE: 9/4/2024    INDICATION: hypoxia, elevated d dimer  COMPARISON: CT AP earlier today, CT chest 4/19/2013, CTA chest 2/7/2013  TECHNIQUE: CT chest pulmonary angiogram during arterial phase injection of IV contrast. Multiplanar reformats and MIP reconstructions were performed. Dose reduction techniques were used.   CONTRAST: 135ml isovue 370    FINDINGS:  ANGIOGRAM CHEST: Excellent opacification of pulmonary arteries and branches. Bilateral segmental and subsegmental pulmonary emboli noted in the right upper, middle and both lower lobes. Main pulmonary artery is enlarged to 3.9 cm.  No RV strain. Aorta   and branches are patent.    LUNGS AND PLEURA: There is a continued mosaic appearance to the lungs with bandlike areas of fibroatelectasis anteriorly in both upper lobes, decreased from before. No effusions. No intralobular septal thickening.    MEDIASTINUM/AXILLAE: There a few small left supraclavicular nodes measuring up to a centimeter. There is an enlarged lower right para-aortic node above the hiatus measuring 1.5 cm (axial image 262). No axillary adenopathy. Multiple mediastinal and   bilateral hilar nodes are bit more prominent than before with the largest measuring 1.2 cm (axial image 133). No pericardiophrenic nodes. There is a trace pericardial effusion.    CORONARY ARTERY CALCIFICATION: Cannot evaluate.    UPPER ABDOMEN: Enlarged retrocaval node noted at the level of the right renal artery measuring 4.6 x 2.9 cm (axial image 321). Multiple smaller left para-aortic nodes are present. Incidental splenule. Marked pancreatic atrophy. Prior cholecystectomy.      MUSCULOSKELETAL: No suspicious lesions. Third spacing of fluid with subcutaneous edema especially dependently.      Impression    IMPRESSION:  1.  Patient has bilateral segmental and subsegmental pulmonary emboli without RV strain.  2.  Main pulmonary artery is enlarged at  3.9 cm, little changed over the last decade and consistent with pulmonary arterial hypertension..  3.  Suspicious adenopathy as noted above in the left supraclavicular, lower right para-aortic and  left retrocaval region, highly suspicious for malignancy, likely cervical (see report of CT AP earlier today).  4.  Mosaic appearance to the lungs again noted with fewer areas of fibroatelectasis. Long-standing appearance suggests chronic small airway disease.  5.  There is a very small pericardial effusion.    [Critical Result: New diagnosis of pulmonary embolism and presumed metastatic adenopathy.]    Finding was identified on 2024 9:08 PM CDT.     1.  Dr. Barry Freeman was contacted by me on 2024 9:10 PM CDT and verbalized understanding of the critical result.          Assessment and Recommendations   Impression:   Sherwin Benites is a 68 year old  female with incidental imaging finding concerning for cervical cancer.      Plan:   # cervical mass  # vaginal bleeding  # anemia  Agree with history and imaging concerning for cervical or uterine cancer. Bimanual exam supports diagnosis of cervical mass although SSE was limited by positioning, habitus and bed. Some blood per vagina after bimanual but active bleeding not appreciated on spec exam. Given various health issues recommend admitting to medicine with Gyn Onc following closely via consult.  - plan for repeat speculum exam with biopsy today  - no CI to heparin gtt, agree with hem/onc rec for heparin gtt without bolus  - if overt vaginal bleeding with initiation of heparin, page gyn onc for vaginal packing overnight  - suspect she will require more than 2U pRBCs already ordered, suggest prepping more now  - please get a patient weight for consideration of possible radiation option      Thank you for allowing us to be involved in the care of your patient. Please do not hesitate to give us a call with further questions.     Gyn Onc Team Pager: [phone]      Arsh Lackey MD  [facility]  Gynecology Oncology Resident, PGY-2  09/05/2024 12:48 AM    To reach the GYNECOLOGY ONCOLOGY team for this patient, please page [phone]     Provider Disclosure:   I agree with above History, Review of Systems, Physical exam and Plan. I have reviewed the content of the documentation and have edited it as needed. I have seen and personally performed the services documented here and the documentation accurately represents those services and the decisions I have made.     Electronically signed by:   Tyrone Thomas MD   Gynecologic Oncology   [facility]

## 2024-09-05 NOTE — H&P
[facility]    History and Physical - Hospitalist Service, GOLD TEAM        Date of Admission:  9/4/2024    Assessment & Plan      Sherwin Benites is a 68 year old female admitted on 9/4/2024. She has hx of chronic resp failure 2/2 COPD/RICKY, morbid obesity, chronic vaginal bleeding who presented to ED with failure to thrive picture and SOB and found to have DVT/PE, cervical mass and severe anemia.     # Bilateral segmental and subsegmental PE  # RLE DVT  # Acute on chronic respiratory failure   # Hx of RICKY on home nocturnal bipap  # Acute on chronic blood loss anemia 2/2 vaginal bleeding   Pt presented with SOB, found to be hypoxic by paramedics, CT PE revealed b/l PE, no CT e/o right heart strain, however, does have elevated BNP. Pt also noted to Have Hgb in 4's, likely due to chronic vaginal bleeding. ED contacted Hematology, ok to continue heparin gtt without bolus, transfuse and monitor   - Heparin gtt  - S/p 2U PRBCS  - Will repeat Hgb, then monitor Q6H for now   - Will get TTE, if e/o strain will likely need to contact PERT team  - Cardiac monitor for now  - Formal Hematology consult    # Cervical mass   # Chronic vaginal bleeding   Pt also c/o abdominal pain and given severity of anemia CT A/P was obtained which revealed large cervical mass. GynOnc recommended medicine admission given medical complexity   - Gyn-onc following: plan for speculum exam with bx tomorrow     # Generalized weakness  # Failure to thrive   # Weight loss   # B/L LE edema   # Morbid obesity   Symptoms likey 2/2 cancer and anemia   - Will consult Nutrition, PT/OT  - Consult Lymphedema     # MEENAKSHI vs CKD  Pt's Cr is 1.53, most recent is 0.75 in 2021.   - Will give mild bolus given poor PO intake and monitor Cr    # Hx of COPD/RICKY  # Report of distant Heart attack   Pt reports having heart attack in 2004 but not any any meds. She is on oxygen during the day and BiPAP at night   - Cont to monitor   -  Cont PTA nocturnal BiPAP          Diet: Combination Diet Clear Liquid    DVT Prophylaxis: Heparin gtt  Miller Catheter: Not present  Lines: None     Cardiac Monitoring: ACTIVE order. Indication: PE  Code Status: Full Code      Clinically Significant Risk Factors Present on Admission              # Hypoalbuminemia: Lowest albumin = 2.7 g/dL at 9/4/2024  4:45 PM, will monitor as appropriate        # Anemia: based on hgb <11                        Disposition Plan     Medically Ready for Discharge: Anticipated in 2-4 Days           Raul Lundy MD  Hospitalist Service, [facility]  Securely message with [employer] (more info)  Text page via [facility] Paging/Directory   See signed in provider for up to date coverage information    ______________________________________________________________________    Chief Complaint   SOB    History is obtained from the patient    History of Present Illness   Sherwin Benites is a 68 year old female with hx of RICKY, morbid obesity, chronic vaginal bleeding and report of distant heart attack who presented with multiple complaint and found to have b/l PE, cervical mass and severe anemia.     Patient states she has had intermittent vaginal bleeding for years, lately she has been very fatigued and SOB. She has lost about 150 lbs in the last several months due to pain in her abdomen especially when she eats. She denies fever, chills or night sweats. She does report intermittent diarrhea. Denies chest pain, does report RLE calf pain. She reports her last hospitalization was for the flue in 2015. Tries to avoid hospitals and doctors. On presentation she was hypoxic but afebrile and HDS. She was was found to be anemic with CT PE e/o b/l PE and CT A/P e/o cervical mass. She was seen by Gyn-On c. .       Past Medical History    Past Medical History:   Diagnosis Date    Arthritis ~2002, ~2015    thumbs, feet, knees, hips    COPD (chronic  obstructive pulmonary disease) (H) Feb 2013    HN flu    Depressive disorder younger person    stay ok w/vit-min & good nutrition    Heart disease ~2004    1 heart attack then, another later.    Morbid obesity (H)     Pneumonia     Hx of multiple pneumonia    Premature infant     born 3 months early    Uncomplicated asthma baby    winter bronchitis in adulthood       Past Surgical History   Past Surgical History:   Procedure Laterality Date    BREAST BIOPSY, RT/LT  2005    Right    BREAST SURGERY  ~1995?    biopsy only -neg    CHOLECYSTECTOMY         Prior to Admission Medications   Prior to Admission Medications   Prescriptions Last Dose Informant Patient Reported? Taking?   ORDER FOR DME Unknown  Yes Yes   Sig: Please use BiPAP as directed by your sleep provider.  .       Facility-Administered Medications: None           Physical Exam   Vital Signs: Temp: 98.3  F (36.8  C) Temp src: Axillary BP: 113/50 Pulse: 85   Resp: 18 SpO2: 96 % O2 Device: BiPAP/CPAP Oxygen Delivery: 7 LPM  Weight: 0 lbs 0 oz    Constitutional: Awake, alert, cooperative, no apparent distress.  HEENT: Moist mucous membranes, normal dentition.  Respiratory: On bipap, CTAB  Cardiovascular: Regular rate and rhythm, normal S1 and S2, 3+ b/l edema   GI: Soft, non-distended, mild TTP  Skin: No rashes, no cyanosis, no edema.  Musculoskeletal: No joint swelling, erythema or tenderness.  Neurologic: Cranial nerves 2-12 intact, normal strength and sensation.  Psychiatric: Alert, oriented to person, place and time, no obvious anxiety or depression.      Medical Decision Making       75 MINUTES SPENT BY ME on the date of service doing chart review, history, exam, documentation & further activities per the note.      Data     I have personally reviewed the following data over the past 24 hrs:    17.9 (H)  \   4.9 (LL)   / 288     138 105 32.9 (H) /  84   4.1 19 (L) 1.53 (H) \     ALT: <5 AST: 17 AP: 126 TBILI: 0.3   ALB: 2.7 (L) TOT PROTEIN: 6.0 (L)  LIPASE: 14     Trop: 39 (H) BNP: 23,140 (H)     TSH: 4.65 (H) T4: 1.15 A1C: N/A     Procal: N/A CRP: N/A Lactic Acid: 1.2       INR:  N/A PTT:  N/A   D-dimer:  2.91 (H) Fibrinogen:  N/A

## 2024-09-06 NOTE — PROGRESS NOTES
"9/6/2024  2:51 AM    HOSPITAL MEDICINE NOTE:  Was notified of \"low\" (patient has been running low blood glucose) and that she is NPO and is on BIPAP and has no IV fluid(s) running.  Is not a diabetic.  Plan:  D5 1/2NS - 50cc/hr.  Discussed with RN and charge nurse.    Steve Mayen MD  [phone] pager  [phone] mobile/cell    "

## 2024-09-06 NOTE — PHARMACY-VANCOMYCIN DOSING SERVICE
Pharmacy Vancomycin Initial Note  Date of Service 2024  Patient's  1955  68 year old, female    Indication: Sepsis    Current estimated CrCl = Estimated Creatinine Clearance: 69.8 mL/min (A) (based on SCr of 1.01 mg/dL (H)).    Creatinine for last 3 days  2024:  4:45 PM Creatinine 1.53 mg/dL  2024:  8:58 AM Creatinine 1.22 mg/dL  2024: 12:23 AM Creatinine 1.09 mg/dL; 12:50 AM Creatinine 1.07 mg/dL;  8:30 AM Creatinine 1.01 mg/dL    Recent Vancomycin Level(s) for last 3 days  No results found for requested labs within last 3 days.      Vancomycin IV Administrations (past 72 hours)        No vancomycin orders with administrations in past 72 hours.                    Nephrotoxins and other renal medications (From now, onward)      Start     Dose/Rate Route Frequency Ordered Stop    24 1300  vancomycin (VANCOCIN) 1,500 mg in 0.9% NaCl 250 mL intermittent infusion         1,500 mg  166.7 mL/hr over 90 Minutes Intravenous EVERY 24 HOURS 24 1243              Contrast Orders - past 72 hours (72h ago, onward)      Start     Dose/Rate Route Frequency Stop    24 1940  iopamidol (ISOVUE-370) solution 135 mL         135 mL Intravenous ONCE 24            Insight[employer] Prediction of Planned Initial Vancomycin Regimen  Regimen: 1500 mg IV every 24 hours.  Start time: 12:42 on 2024  Exposure target: AUC24 (range)400-600 mg/L.hr   AUC24,ss: 551 mg/L.hr  Probability of AUC24 > 400: 75 %  Ctrough,ss: 14 mg/L  Probability of Ctrough,ss > 20: 31 %  Probability of nephrotoxicity (Lodise PAUL ): 9 %      Plan:  Start vancomycin 1500 mg IV q24h.   Vancomycin monitoring method: AUC  Vancomycin therapeutic monitoring goal: 400-600 mg*h/L  Pharmacy will check vancomycin levels as appropriate in 1-3 Days.    Serum creatinine levels will be ordered daily for the first week of therapy and at least twice weekly for subsequent weeks.      Christophe Parra, PharmD

## 2024-09-06 NOTE — PROCEDURES
[facility]    Procedure: IR Procedure Note    Date/Time: 9/6/2024 4:28 PM    Performed by: Dion Prasad MD  Authorized by: Dion Prasad MD  IR Fellow Physician: Brandan Kitchen    Pre Procedure Diagnosis: Uterine hemorrhage  Post Procedure Diagnosis: Uterine hemorrhage    UNIVERSAL PROTOCOL   Site Marked: Yes  Prior Images Obtained and Reviewed:  Yes  Required items: Required blood products, implants, devices and special equipment available    Patient identity confirmed:  Verbally with patient, arm band, provided demographic data and hospital-assigned identification number  Patient was reevaluated immediately before administering moderate or deep sedation or anesthesia  Confirmation Checklist:  Patient's identity using two indicators, relevant allergies, procedure was appropriate and matched the consent or emergent situation and correct equipment/implants were available  Time out: Immediately prior to the procedure a time out was called    Universal Protocol: the Joint Commission Universal Protocol was followed    Preparation: Patient was prepped and draped in usual sterile fashion       ANESTHESIA    Anesthesia:  Local infiltration  Local Anesthetic:  Lidocaine 1% without epinephrine      SEDATION    Patient Sedated: No    See dictated procedure note for full details.  Findings: Enlarged Left Uterine artery   Diminutive right uterine artery  Normal IVC caliber    Specimens: none    Procedural Complications: None    Condition: Stable    Plan: Successful Embo L uterine Artery  Unable to cannulate Left uterine Artery  Successful IVC filter placement      PROCEDURE  Describe Procedure: Pelvic Angiogram  IVC cavogram  Embolization of L uterine artery with 400 and 600 HydroPearls  Randall IVC filter, retrievable  Patient Tolerance:  Patient tolerated the procedure well with no immediate complications  Length of time physician/provider present for 1:1 monitoring during  sedation:  0 min and 113-127 min   IVC filter is retrievable, once patient is able to tolerate AC

## 2024-09-06 NOTE — PLAN OF CARE
Patient had temp taken at 96.5 with cool skin. Pt on BiPAP at 50% FiO2, which was able to decrease to 30% FiO2 throughout the day. Patient also used Oxymask throughout shift successfully at 6-10LPM. BP soft in 90s (systolic) over 50s (diastolic). Lowest MAP was 53, and MD notified. 1 unit of RBCs given. Temp stabilized.     Pt had episode of substantial bleeding this morning. Pt passed multiple clots ranging from quarter sized to lemon sized, as well as soaking 4 ABD pads and chux pad within one hour. Heparin drip discontinued. Provider notified and was at bedside within one hour. RRT called for bleeding with hypothermic temps. GYN/ONC consulted, biopsy obtained and vaginal packing was placed. Patient saturated most of 1 ABD pad following packing. Will continue to monitor.     Other Circulatory: Potassium electrolyte protocol but no replacements needed. Telemetry discontinued today.   Respiratory: Additional work of breathing noted with use of abdominal muscles.  GI/: Borderline inadequate urine output, provider aware. No BM today. Hypoactive bowel sounds.  Diet/appetite: NPO. Additional 25 mg of D50 given until BG > 80 x 2 this morning. Now on Q4H blood glucose checks.  Activity:  Lift assist, turned frequently.  Pain: Patient sensitive to touch on LE calves. Morphine given x1, with 50 mcg given of Fentanyl during GYN/ONC bedside procedure. Resting comfortably before heading to IR.  Skin: No new deficits noted. Pale and cool to the touch during morning episode. Remains pale, but extremities warm before heading to IR. Monitor bridge of nose while on BiPAP due to red blanchable area.  LDA's: R PIV x2, one saline locked and the other infusing D5 1/2 NS.     Plan: Currently in IR for IVC filter placement and UAE. Radiation oncology consulted today. Notify primary team with changes.       Problem: ARDS (Acute Respiratory Distress Syndrome)  Goal: Effective Oxygenation  Outcome: Progressing  Intervention: Optimize  Oxygenation, Ventilation and Perfusion  Flowsheets  Taken 9/6/2024 1456  Lung Protection Measures: fluid excess minimized  Head of Bed (HOB) Positioning: HOB at 30-45 degrees  Stabilization Measures: blood products administered  Taken 9/6/2024 0817  Head of Bed (HOB) Positioning: HOB at 30-45 degrees     Problem: Pain Acute  Goal: Optimal Pain Control and Function  Outcome: Progressing  Intervention: Develop Pain Management Plan  Flowsheets (Taken 9/6/2024 1456)  Pain Management Interventions: medication (see MAR)  Intervention: Prevent or Manage Pain  Flowsheets  Taken 9/6/2024 1456  Medication Review/Management:   medications reviewed   high-risk medications identified  Taken 9/6/2024 0747  Medication Review/Management:   medications reviewed   high-risk medications identified

## 2024-09-06 NOTE — PROGRESS NOTES
Pt taken down to IR around 1315 on hovermat.  Pt returned from IR at 1729.    Pt transferred to/from: IR  Reason for transfer: Finished UAE and IVC filter procedure  Family aware of transfer: Significant other in room when pt returned  Disposition of belongings: Remained in room  Teaching: Re-oriented to room and update pt's wife  Report called to/from: IR staff  Skin double check completed by: Cari Lopez RN and Fanny Jacobs RN

## 2024-09-06 NOTE — PROVIDER NOTIFICATION
Earl text conversation with primary team    JN - Hello! We are trying to contact team about this patient. Patient is having substantial blood loss, unmeasured, with multiple blood clots quarter sized and golf ball sized due to the vaginal bleeding. Heparin drip has been stopped at the moment.  Read - 8:16 am  DEACON - Lab is in the room drawing now.    Cheikh Hammer - 8:26 am  vital signs are ok?    Read - 8:29 am  DEACON - HR 80s. /44 MAP 62. O2 weaned down to 10L oxy mask since she was uncomfortable with BiPap. SpO2 90s    Cheikh Hammer - 8:29 am  that sounds good    Cheikh Hammer - 8:29 am  i will see if gyn can come take a look at the bleeding    Read - 8:33 am  DEACON - Oh yes! The blood filled most of a chux pad. Can you put in conditional orders for transfusions? Also need BG orders (Q4-6?). Had to give 25mg more of dextrose in IV to get BG >80x2    Cheikh Hammer - 8:35 am  yea I can put in sugars checks    Cheikh Hammer - 8:36 am  I would prefer to transfuse manually as opposed to conditional orders    Read - 8:38 am  DEACON - Her temp is 94.5 orally and axillary. Her skin feels cool but she says she was hot earlier.    Cheikh Hammer - 8:40 am  got it    Read - 8:40 am  DEACON - Getting a new thermometer to check again.    Cheikh Hammer - 8:41 am  ok    Read - 8:44 am  DEACON - 96.6 with new thermometer. We'll keep an eye on it.    Cheikh Hammer - 8:47 am  Ok    Read - 8:47 am  DEACON - Pt has passed a new 60ml clot. Single clot.    Read - 8:49 am  DEACON - She also soaked through 2 ABD pads since cleaning her up 1 hr ago

## 2024-09-06 NOTE — CONSULTS
Classical (Benign) Hematology Consult Note    I reviewed the patient's history and pertinent medical records and I met with the patient and discussed my impression and recommendations on 9/5/2024.      HISTORY  She is a 68-year old woman with a prolonged (several year) history of vaginal bleeding not previously investigated who presented with severe dyspnea and leg swelling.  She has been diagnosed with lower extremity deep vein thromboses as well as bilateral segmental and subsegmental pulmonary emboli.  She was also noted to have a cervical mass and associated adenopathy concerning for gynecologic malignancy.  Hematology was asked to comment on anticoagulation management for the VTE in the setting of vaginal bleeding, severe anemia (hemoglobin <5 on presentation), and potential need for gynecologic procedure.    DATA  --Hemoglobin 4.9 on presentation likely reflecting both blood loss and marrow suppression in the setting of potential malignancy, curtis to 8.0 with transfusion of 3U PRBC (appropriate response) and subsequent minor drop to 7.7 during the day; likely reflects minor ongoing loss without significant/hemorrhagic burden  --Leukocytosis, likely reactive  --Normal platelet count  --Mild MEENAKSHI on presentation, improved but not completely resolved  --Ferritin 7 on presentation (pre-transfusion) reflecting significant iron deficiency likely in the setting of ongoing blood loss from vaginal bleeding  --Chest CT with bilateral segmental and subsegmental pulmonary emboli  --Abdomen/pelvis CT with large cervical mass and enlarged retroperitoneal nodes suspicious for cervical malignancy with bay spread  --Ultrasound with DVT right popliteal, posterior tibial, common femoral veins    IMPRESSION  Based on the available history, my independent interpretation of the laboratory test results and discussion with the patient, I believe that she experienced VTE events which are likely provoked in the setting of malignancy  (cancer-associated thrombosis).  Given the relatively extensive nature of these clots, anticoagulation is certainly indicated.  She does have vaginal bleeding and anemia (with evidence of significant iron deficiency) but it is likely that this has been ongoing for quite some time (years) leading to slow decline in hemoglobin rather than an acute blood loss.  She also responded quite appropriately to transfusion.  Therefore, anticoagulation benefit likely outweighs the risk at this time.  Unfractionated heparin infusion is a good choice for now while the gynecologic oncology team determines the need for procedure to diagnose/treat underlying possible malignancy.  After procedure(s) have been completed and she is hemodynamically stable, we would consider transitioning to DOAC (likely apixaban) for long-term anticoagulation.  She will need ongoing/indefinite anticoagulation in the setting of malignancy, if this is the diagnosis that is made.      RECOMMENDATIONS  --Unfractionated heparin for now, pending gynecologic oncology assessment/plan and any pending procedural needs  --After procedures complete and plan in place for further management from gynecologic perspective and once hemodynamically stable, transition to apixaban 5mg twice daily for long-term anticoagulation (indefinite in the setting of presumed malignancy)  --She has received 3U PRBC and this will replace a good deal of the underlying iron deficiency and she will need follow-up labs as outpatient to see if further supplementation is necessary    Overall, I spent 80 minutes today (DOS) face-to-face and/or coordinating care as documented above and specifically listed as below:  --Reviewing documentation related to patient's history and this current admission available in EPIC   --Review and clinical interpretation of pertinent laboratory test results and radiology reports from this admission  --Discussion of the patient's case with the members of the  Hematology Consult Team  --Discussion with the patient   --Documentation in the electronic health record    Kayla Grimes MD  Date of Service: 9/5/2024

## 2024-09-06 NOTE — IR NOTE
Patient Name: Sherwin Benites  Medical Record Number: [id number]  Today's Date: 9/6/2024    Procedure: IVC filter placement and pelvic angiogram with embolization left uterine artery.  Proceduralist: Dr. Prasad and Dr. Kitchen  Pathology present: N/A    Procedure Start: 1418  Procedure end: 1625  Sedation medications administered: 200 mcg fentanyl, 4 mg versed    Other medications given: 4 mg Decadron, 15 mg Toradol    Interventional access: Right femoral artery and right internal jugular vein    Right femoral artery closed with angioseal deployed @ 1611    2 hrs FLAT bedrest until 1811  RLE straight for 2 hrs until 1811    Report given to: Cari on 5C  : n/a    Other Notes: Pt arrived to IR room 4 from . Consent reviewed. Pt denies any questions or concerns regarding procedure. Pt positioned supine and monitored per protocol. Pt tolerated procedure without any noted complications. Pt transferred back to .

## 2024-09-06 NOTE — PROGRESS NOTES
[facility]    Medicine Progress Note - Hospitalist Service, GOLD TEAM 1    Date of Admission:  9/4/2024    Assessment & Plan   Sherwin Benites is a 68 year old female admitted on 9/4/2024. She has hx of chronic resp failure 2/2 COPD/RICKY, morbid obesity, chronic vaginal bleeding who presented to ED with failure to thrive picture and SOB and found to have DVT/PE, cervical mass and severe anemia.     Interim history  Significant vaginal bleeding this morning with clots. Heparin drip held and gyn onc evaluated at the bedside. IR planning for intervention as below. Per patient breathing still is labored. No other complaints.     Today  1 unit PRBC, monitor hemoglobin every 6 hours  Resume heparin when bleeding stable  IR planning AUE today  IR planning IVC filter today   Continue BIPAP, wean as able      # Bilateral segmental and subsegmental PE  # RLE DVT  # Acute on chronic respiratory failure   # Hx of RICKY on home nocturnal bipap  # Hx of COPD on 3L oxygen at baseline  #  Leukocytosis   - Pt presented with SOB, found to be hypoxic by paramedics, CT PE revealed b/l PE, no CT e/o right heart strain, however, does have elevated BNP. ED contacted Hematology, ok to continue heparin gtt without bolus, transfuse and monitor   - 9/5/2024 patient's respiratory status worsened requiring BiPAP. Rapid called. CXR with mild increase in suprahilar streaky opacities and bronchial wall thickening. White count is up to 20.4. VBG 7.29/48. Troponin is 36. Stat echo with flattened septum, right ventricular pressure, moderate right ventricular dilation, mild reduction in ventricular function. Per cards fellow IVF fairly flat. Lactic acid wnl.   - etiology may be multifactorial. PE may be contributing but overall clot burden not that impressive so unclear if worsening respiratory status is attributed solely to PE. No wheezing or concern for COPD exacerbation. Low concern for infection at this  time with suspicion that leukocytosis is likely reacted. TACO is a possibility given recent transfusions. However per CARDS fellow IVC is flat. Need to be careful with diuresis as she is preload dependent with PE.   - Of note, PERT team was activated in the ED. No indication for TPA or embolectomy at this time.   - 9/6. Heparin drip on hold due to severe bleeding.   Plan  - resume heparin drip when able   - plan for IR placement IVC filter today  - continue BiPAP for now   - D5 fluids while on BiPAP  - vancomycin/cefepime (D1) 9/6 for possible pneumonia  - IMC status    # Acute on chronic blood loss anemia 2/2 vaginal bleeding   - Hgb in 4's in the ED, likely due to chronic vaginal bleeding.   - S/p 4U PRBCS so far   - continues to have bleeding issues while on heparin drip   Plan  - monitor hemoglobin every 6 hours  -  # Cervical mass concerning for malignancy  # Chronic vaginal bleeding   # suspicious left supraclavicular, lower right para-aortic and left retrocaval regoin adenopathy that is concerning for malignancy   - Pt also c/o abdominal pain and given severity of anemia CT A/P was obtained which revealed large cervical mass. GynOnc recommended medicine admission given medical complexity   - Gyn-onc following: speculum exam with biopsy 9/6  Plan  - plan for IR AUE on 9/6  - follow up biopsy results     # Generalized weakness  # Failure to thrive   # Weight loss   # B/L LE edema   # Morbid obesity   - Symptoms likey 2/2 cancer and anemia   - Will consult Nutrition, PT/OT  - Consult Lymphedema      # MEENAKSHI vs CKD  - Pt's Cr is 1.53, most recent is 0.75 in 2021.   - improved somewhat with fluids  Plan  - continue to monitor      # Hx of COPD/RICKY  # Report of distant Heart attack   - Pt reports having heart attack in 2004 but not any any meds. She is on oxygen during the day and BiPAP at night   - Cont to monitor   - Cont PTA nocturnal BiPAP                Diet: Snacks/Supplements Adult: Nargismacario Wright Standard Oral  "Supplement; With Meals  NPO per Anesthesia Guidelines for Procedure/Surgery Except for: Meds, No Exceptions    DVT Prophylaxis: heparin drip  Miller Catheter: PRESENT, indication: Insertion difficulty;Other (Comment) (intense pain with movement and respiratory distress)  Lines: None     Cardiac Monitoring: ACTIVE order. Indication: PE  Code Status: Full Code      Clinically Significant Risk Factors              # Hypoalbuminemia: Lowest albumin = 2.7 g/dL at 9/4/2024  4:45 PM, will monitor as appropriate  # Coagulation Defect: INR = 1.40 (Ref range: 0.85 - 1.15) and/or PTT = N/A, will monitor for bleeding              # Severe Obesity: Estimated body mass index is 42.09 kg/m  as calculated from the following:    Height as of this encounter: 1.676 m (5' 6\").    Weight as of this encounter: 118.3 kg (260 lb 12.9 oz)., PRESENT ON ADMISSION  # Severe Malnutrition: based on nutrition assessment, PRESENT ON ADMISSION                Disposition Plan     Medically Ready for Discharge: Anticipated in 5+ Days           The patient's care was discussed with the attending physician    Cheikh Hammer, PA-C  Hospitalist Service, GOLD TEAM 1  [facility]  Securely message with [employer] (more info)  Text page via [facility] Paging/Directory   See signed in provider for up to date coverage information  ______________________________________________________________________    Interval History   See above     Physical Exam   Vital Signs: Temp: 97.7  F (36.5  C) Temp src: Axillary BP: 114/50 Pulse: 88   Resp: 18 SpO2: 98 % O2 Device: Oxymask Oxygen Delivery: 6 LPM  Weight: 260 lbs 12.87 oz    General Appearance: respiratory distress  HEENT: Anicteric sclera, MMM   Respiratory: Lungs CTAB, tachypneic   Cardiovascular: Regular rate , Regular rhythm , no murmur   GI: Abdomen soft, non-tender, active bowel sounds. No guarding or rebound  Skin: No rash or jaundice on exposed skin   Musculoskeletal: " No lower extremity edema   Neurologic: Oriented X 3 , CN II-XII grossly intact, Moves extremities equally       Medical Decision Making       60 MINUTES SPENT BY ME on the date of service doing chart review, history, exam, documentation & further activities per the note.      Data   ------------------------- PAST 24 HR DATA REVIEWED -----------------------------------------------

## 2024-09-06 NOTE — PRE-PROCEDURE
GENERAL PRE-PROCEDURE:     Verbal consent obtained?: Yes    Written consent obtained?: Yes    Risks and benefits: Risks, benefits and alternatives were discussed    Consent given by:  Guardian  Patient states understanding of procedure being performed: Yes    Patient's understanding of procedure matches consent: Yes    Procedure consent matches procedure scheduled: Yes    Expected level of sedation:  Moderate  Appropriately NPO:  Yes  ASA Class:  2  Mallampati  :  Grade 2- soft palate, base of uvula, tonsillar pillars, and portion of posterior pharyngeal wall visible  Lungs:  Lungs clear with good breath sounds bilaterally  Heart:  Normal heart sounds and rate  History & Physical reviewed:  History and physical reviewed and no updates needed  Statement of review:  I have reviewed the lab findings, diagnostic data, medications, and the plan for sedation

## 2024-09-06 NOTE — CONSULTS
"      [facility] Consult Service Note  Interventional Radiology  09/06/24   11:55 AM    Consult Requested: \"Possible uterine artery embolization\"    Recommendations/Plan:    Patient is approved for IR UAE and IVC filter placement.    Timing of procedure is TBD based on IR staffing/schedule and triage.  Please contact the IR charge RN at [phone] for estimated time of procedure.     Labs WNL for procedure.    Orders entered for procedure, NPO status, and pre procedure IV antibiotics (signed and held)  Medications to be held include: Heparin currently held due to bleeding.   Informed consent will be completed prior to procedure.     Case and imaging discussed with IR attending   Recommendations were reviewed with Jesus Montiel and Cheikh Hammer PA-C.     History of Present Illness:  Sherwin Benites is a 68 year old female admitted on 9/4/2024. She has hx of chronic resp failure 2/2 COPD/RICKY, morbid obesity, chronic vaginal bleeding who presented to ED with failure to thrive picture and SOB and found to have DVT/PE, cervical mass and severe anemia. Patient has been on a heparin gtt for DVT/PE. Patient has had several year hx of vaginal bleeding. Patient unfortunately has had new onset bleeding vaginally. Four ABD pads have been soaked this morning, with golf ball sized clots. Patient had a cervical biopsy completed previously. Team is requesting a UAE along with an IVC filter placement. Team does not feel that patient has an active ongoing infection.    Diagnostic labs to be entered by: N/A     Pertinent Imaging Reviewed:   CT abdomen pelvis w/ contrast 9/4/24  No results found for this or any previous visit (from the past 24 hour(s)).    Expected date of discharge:  09/07/2024    Vitals:   /48 (BP Location: Left arm)   Pulse 79   Temp 97.6  F (36.4  C) (Axillary)   Resp 20   Ht 1.676 m (5' 6\")   Wt 118.3 kg (260 lb 12.9 oz)   SpO2 98%   BMI 42.09 kg/m      Pertinent Labs " Reviewed:  CBC:  Lab Results   Component Value Date    WBC 19.8 (H) 09/06/2024    WBC 20.5 (H) 09/06/2024    WBC 20.1 (H) 09/06/2024    WBC 8.2 07/09/2013    WBC 4.3 02/08/2013    WBC 5.4 02/07/2013     Lab Results   Component Value Date    HGB 7.0 09/06/2024    HGB 7.4 09/06/2024    HGB 7.2 09/06/2024    HGB 12.3 07/09/2013    HGB 13.3 02/08/2013    HGB 13.4 02/07/2013     Lab Results   Component Value Date     09/06/2024     09/06/2024     09/06/2024     07/09/2013     02/10/2013     02/08/2013    INR:  Lab Results   Component Value Date    INR 1.40 (H) 09/06/2024    INR 1.10 07/09/2013          COVID Results:  COVID-19 Antibody Results, Testing for Immunity           No data to display              COVID-19 PCR Results           No data to display             Potassium   Date Value Ref Range Status   09/06/2024 3.8 3.4 - 5.3 mmol/L Final   02/19/2021 4.2 3.4 - 5.3 mmol/L Final          Isidra Wayne PA-C  Interventional Radiology  Pager: [phone]

## 2024-09-06 NOTE — PROGRESS NOTES
Hematology/Oncology Fellow Note  09/06/2024    I was contacted by the primary team Cheikh Hammer regarding Sherwin Benites.    Patient had a rapid response called this morning for increased vaginal bleeding and hypothermia occurring after starting heparin. Heparin was held. Patient was transfused 1u pRBCs. Interventional Radiology evaluated the patient and plans to take the patient for arterial embolization to lessen the risk of further bleeding. Per report, they plan for concurrent IVC placement at that time.    The Hematology team agrees with IVC filter placement at this time given her acute bleed and likely ongoing risk factors for both bleeding and clotting. We recommend placement of a retrievable filter and for follow-up plans to be made prior to discharge regarding consideration for filter removal.    Despite IVC filter placement, patient will remain at risk for clotting both with DVTs distal to the filter, but also with risk for clot extension through the filter. Thus in the coming days once bleeding has stabilized post-procedurally, we recommend repeating therapeutic heparin challenge to assess for anticoagulation tolerance and starting therapeutic anticoagulation prior to discharge. Patient remains at elevated clotting risk due to her history of cancer and we overall recommend lifelong anticoagulation.     Objective    CBC  Recent Labs   Lab Test 09/06/24  1017   WBC 19.8*   HGB 7.0*   HCT 25.6*       BMP  Recent Labs   Lab Test 09/06/24  0830      POTASSIUM 3.8   CHLORIDE 109*   CO2 22   BUN 27.8*   CR 1.01*    LFT  Recent Labs   Lab Test 09/04/24  1645   BILITOTAL 0.3   ALKPHOS 126   AST 17   ALT <5        Echo Complete   Final Result      XR Chest Port 1 View   Final Result   Impression: Increasing suprahilar streaky opacities and bronchial wall   thickening may indicate slightly increasing inflammatory change.   Upright PA and lateral examination when clinical condition permits is    recommended for better detail. Thoracic spondylosis. Apparent calcific   tendinitis of supraspinatus at the right shoulder. Cardiomegaly.      LIVAN POWER MD            SYSTEM ID:  [id number]      CT Chest Pulmonary Embolism w Contrast   Final Result   Abnormal   IMPRESSION:   1.  Patient has bilateral segmental and subsegmental pulmonary emboli without RV strain.   2.  Main pulmonary artery is enlarged at 3.9 cm, little changed over the last decade and consistent with pulmonary arterial hypertension..   3.  Suspicious adenopathy as noted above in the left supraclavicular, lower right para-aortic and  left retrocaval region, highly suspicious for malignancy, likely cervical (see report of CT AP earlier today).   4.  Mosaic appearance to the lungs again noted with fewer areas of fibroatelectasis. Long-standing appearance suggests chronic small airway disease.   5.  There is a very small pericardial effusion.      [Critical Result: New diagnosis of pulmonary embolism and presumed metastatic adenopathy.]      Finding was identified on 9/4/2024 9:08 PM CDT.       1.  Dr. Barry Freeman was contacted by me on 9/4/2024 9:10 PM CDT and verbalized understanding of the critical result.       CT Abdomen Pelvis w Contrast   Final Result   IMPRESSION:    1.  Likely large cervical mass suspicious for primary neoplasm.   2.  Enlarged retroperitoneal lymph nodes, suspicious for disease involvement.   3.  Deep vein thrombus in the right common femoral vein, as seen on same day vascular ultrasound.      Findings discussed with Dr. Freeman at 9:42 PM on 9/4/2024 by Dr. Gutierrez      US Lower Extremity Venous Duplex Bilateral   Final Result   Abnormal   IMPRESSION:   Partially occlusive deep vein thrombosis of the right CFV. Fully   occlusive deep vein thrombosis of the right popliteal and posterior   tibial vein.         [Urgent Result: Deep vein thrombosis]      Finding was identified on 9/4/2024 6:27 PM.       Dr. Freeman was  contacted by Dr. Agustin Whaley at 9/4/2024 6:34 PM and   verbalized understanding of the urgent finding.       I have personally reviewed the examination and initial interpretation   and I agree with the findings.      JANELL ARAMBULA MD            SYSTEM ID:  [id number]      XR Chest 2 Views   Final Result   Impression:    1. No acute airspace abnormality.   2. Prominent pulmonary artery shadow, can be seen with pulmonary   hypertension.      I have personally reviewed the examination and initial interpretation   and I agree with the findings.      JANELL ARAMBULA MD            SYSTEM ID:  [id number]      POC US ECHO LIMITED   Final Result   Limited Bedside Cardiac Ultrasound, performed and interpreted by me.    Indication: Shortness of Breath.   Parasternal long axis, parasternal short axis, apical 4 chamber, and lung views were acquired.    Difficult views due to habitus      Findings:     Left ventricle appears to be with good ejection fraction.  No large pericardial effusion.  No B-lines bilaterally.      IMPRESSION: Appears to have grossly normal left ventricular function..            Echo Complete    (Results Pending)   IR IVC Filter Placement    (Results Pending)   IR Uterine Artery Non Fibroid Embo    (Results Pending)       Thank you for contacting hematology/oncology, please don't hesitate to re-page with questions.    Pedro Raza MD  Hematology/Oncology/BMT Fellow PGY4  Pager: [phone]

## 2024-09-06 NOTE — PROVIDER NOTIFICATION
09/06/24 1000   Call Information   Date of Call 09/06/24   Time of Call 0904   Name of person requesting the team Cari   Title of person requesting team RN   RRT Arrival time 0920   Time RRT ended 1000   Reason for call   Type of RRT Adult   Primary reason for call Pain;Staff concerned;Sudden or unanticipated change in patient condition   Pain Acute and dramatic increase in pain   Pain Location vagina, legs   Was patient transferred from the ED, ICU, or PACU within last 24 hours prior to RRT call? No   SBAR   Situation pt bleeding vaginally, hypothermic, low hgb.   Background per provider note admitted on 9/4/2024. She has hx of chronic resp failure 2/2 COPD/RICKY, morbid obesity, chronic vaginal bleeding who presented to ED with failure to thrive picture and SOB and found to have DVT/PE, cervical mass and severe anemia.   Notable History/Conditions Cardiac;Cancer;COPD   Assessment A+O, Bipap, hypothermic, pale, bleeding from vagina   Interventions Meds;Labs;Other (describe)  (GYN/ONC in room to stop bleed)   Patient Outcome   Patient Outcome Stabilized on unit   RRT Team   Attending/Primary/Covering Physician Gold 1   Date Attending Physician notified 09/06/24   Time Attending Physician notified 0904   Physician(s) Natasha Pimentel PAC   Lead RN Meenakshi Cook RN   Post RRT Intervention Assessment   Post RRT Assessment Stable/Improved   Date Follow Up Done 09/06/24   Time Follow Up Done 1235

## 2024-09-06 NOTE — PLAN OF CARE
"/50 (BP Location: Left arm)   Pulse 88   Temp 97.7  F (36.5  C) (Axillary)   Resp 18   Ht 1.676 m (5' 6\")   Wt 118.3 kg (260 lb 12.9 oz)   SpO2 96%   BMI 42.09 kg/m           Neuro: A&O x4, able to make her needs known   Respiratory: sating >95%, on BIPAP machine FiO2 at 50%. No c/o SOB   Cardiac: VSS, Afebrile. No c/o chest  pain, HR- (80's).  GI:denies nausea, LBM 9/3  : Adequate UOP via Urinal   Diet: NPO  Pain: PRN morphine given x2  Mobility: heavy assist x2 with lift   LDA's: R-PIV x2   Hepp gtt infusing @ 2100 Units/hr.   Dextrose 5% gtt infusing @ 50 ml/hr     Plan: Continue to monitor pain, VS, heart rhythm, fluid status, bowel status, cardiac and respiratory status. Notify care team of changes in patient condition or other concerns.    "

## 2024-09-06 NOTE — CODE/RAPID RESPONSE
Rapid Response Team Note    Assessment   A rapid response was called on Sherwin Benites due to  bleeding, hypothermia, and nursing request for eval . This presentation is likely due to vaginal bleeding from presumed cervical cancer, heparin on hold, blood ordered and Gyn at bedside. BP currently stable, no tachycardia, but with cool extremities could be headed towards shock if bleeding not controlled. With hypothermia, hypoglycemia overnight, and persistent leukocytosis, there is concern for developing sepsis. Infectious work up has been negative (Bcx NGTD, UA, CT chest/abd/pelvis). Will repeat Bcx, and monitor closely, with plans to start empiric broad spectrum abx if develops any further decompensation.     Plan   -  Agree with 1 unit of pRBC, (2 prepared), with repeat hgb after transfusion  -  Agree with holding heparin drip  -  Gyn at bedside with vaginal packing  -  Blood culture, procal, and lactic acid   -  Bear hugger for hypothermia.   -  Low threshold to start abx, though unclear source. Reporting diarrhea yesterday but no further stools. C. Diff ordered  -  The Internal Medicine primary team was able to be reached and they are in agreement with the above plan.  -  Disposition: The patient will remain on the current unit. We will continue to monitor this patient closely.  -  Reassessment and plan follow-up will be performed by the primary team    Rowan Pimentel PA-C  Rapid Response Team [facility]  Securely message with [employer]     Medical Decision Making       25 MINUTES SPENT BY ME on the date of service doing chart review, history, exam, documentation & further activities per the note.          Hospital Course   Brief Summary of events leading to rapid response:   RRT was called for increased vaginal bleeding, hypothermia and request for another set of eyes by nursing.     Patient in the last hour soaked through 4 ABD pads with significant clotting. Nursing report extremities feel cool.     Physical Exam    Vital Signs: Temp: 97.7  F (36.5  C) Temp src: Axillary BP: 92/71 Pulse: 73   Resp: 18 SpO2: 100 % O2 Device: BiPAP/CPAP Oxygen Delivery: 10 LPM  Weight: 260 lbs 12.87 oz  Exam:   GENERAL: Alert and awake. Acute on chronically ill appearing. Pale.   RESPIRATORY: Non-labored breathing on BiPAP.  : Miller catheter in place with clear yellow urine. Significant vaginal bleeding with multiple soaked ABD pads and golf ball sized clot.   EXTREMITIES: + LE edema  SKIN: Pale.    Significant Results and Procedures   See chart    Sepsis Evaluation   The patient is not known to have an infection.    NO EVIDENCE OF SEPSIS at this time.  Vital sign, physical exam, and lab findings are due to see above. .

## 2024-09-06 NOTE — PLAN OF CARE
"Continues on BIPAP- increased to 50% at 2025 when desat'd to upper 80's while deeply sleeping.  Now upper 90's-100 on 50% BIPAP.  Nose was reddened so switch to full BIPAP mask.  Tele in NSR-Sinus tachy.  Was alarming frequently after moving patient.  EKG done and unchanged from prior.  Adjusted views on monitor and changed patches.  Pt requiring frequent assistance and wanting to get out of bed.  Allowed to dangle at bedside-was very difficult and painful to move.  Would benefit from Bariatric bed.  Crying in pain and received one time order for Morphine 1 mg which provided relief and allowed pt to rest.  However, did not last.  Lido patch placed on low back.  Pt cries out when skin is touched, especially on legs.  Paged MD for pain control again and new orders received for Morphine Q3 hrs and icy hot for hips and back.  Hep 10a level therapeutic.  Recheck due at 2330.  Currently infusing at 1950 units/hr.  Miller placed with Gyn/Onc assistance.  UA sent.  Vaginal bleeding noted with clots.  MD aware.  Hgb 7.7 at 1700.  Bed alarm on.  Will continue with POC.      Problem: Adult Inpatient Plan of Care  Goal: Plan of Care Review  Description: The Plan of Care Review/Shift note should be completed every shift.  The Outcome Evaluation is a brief statement about your assessment that the patient is improving, declining, or no change.  This information will be displayed automatically on your shift  note.  Outcome: Not Progressing  Goal: Patient-Specific Goal (Individualized)  Description: You can add care plan individualizations to a care plan. Examples of Individualization might be:  \"Parent requests to be called daily at 9am for status\", \"I have a hard time hearing out of my right ear\", or \"Do not touch me to wake me up as it startles  me\".  Outcome: Not Progressing  Goal: Absence of Hospital-Acquired Illness or Injury  Outcome: Not Progressing  Intervention: Identify and Manage Fall Risk  Recent Flowsheet " Documentation  Taken 9/5/2024 2200 by Bethany Romero RN  Safety Promotion/Fall Prevention: safety round/check completed  Taken 9/5/2024 2100 by Bethany Romero RN  Safety Promotion/Fall Prevention: safety round/check completed  Taken 9/5/2024 2000 by Bethany Romero RN  Safety Promotion/Fall Prevention:   safety round/check completed   assistive device/personal items within reach   clutter free environment maintained   increased rounding and observation   increase visualization of patient   lighting adjusted   nonskid shoes/slippers when out of bed   patient and family education   room organization consistent   room near nurse's station   treat reversible contributory factors  Taken 9/5/2024 1900 by Bethany Romero RN  Safety Promotion/Fall Prevention: safety round/check completed  Taken 9/5/2024 1800 by Bethany Romero RN  Safety Promotion/Fall Prevention: safety round/check completed  Taken 9/5/2024 1700 by Bethany Romero RN  Safety Promotion/Fall Prevention: safety round/check completed  Taken 9/5/2024 1600 by Bethany Romero RN  Safety Promotion/Fall Prevention:   activity supervised   assistive device/personal items within reach   clutter free environment maintained   increased rounding and observation   increase visualization of patient   lighting adjusted   patient and family education   room near nurse's station   room organization consistent   safety round/check completed   supervised activity   treat reversible contributory factors  Intervention: Prevent Skin Injury  Recent Flowsheet Documentation  Taken 9/5/2024 2200 by Bethany Romero RN  Body Position:   turned   right   side-lying  Taken 9/5/2024 2100 by Bethany Romero RN  Body Position: (wants to get up and sit) supine  Taken 9/5/2024 2000 by Bethany Romero RN  Body Position:   turned   right   side-lying  Taken 9/5/2024 1900 by Bethany Romero RN  Body Position:   turned   left   side-lying  Taken 9/5/2024 1800 by Bethany Romero RN  Body  Position: (dangled at bedside with RN present) other (see comments)  Taken 9/5/2024 1700 by Bethany Romero RN  Body Position: (for garner placement) supine  Taken 9/5/2024 1600 by Bethany Romero RN  Skin Protection:   adhesive use limited   incontinence pads utilized   protective footwear used   skin to device areas padded   skin to skin areas padded   transparent dressing maintained  Device Skin Pressure Protection:   absorbent pad utilized/changed   adhesive use limited   positioning supports utilized   pressure points protected   skin-to-device areas padded   tubing/devices free from skin contact   skin-to-skin areas padded  Intervention: Prevent and Manage VTE (Venous Thromboembolism) Risk  Recent Flowsheet Documentation  Taken 9/5/2024 1600 by Bethany Romero RN  VTE Prevention/Management: (lymphedema wraps on) other (see comments)  Intervention: Prevent Infection  Recent Flowsheet Documentation  Taken 9/5/2024 2000 by Bethany Romero RN  Infection Prevention:   cohorting utilized   environmental surveillance performed   hand hygiene promoted   personal protective equipment utilized   rest/sleep promoted   single patient room provided   visitors restricted/screened  Taken 9/5/2024 1600 by Bethany Romero RN  Infection Prevention:   cohorting utilized   environmental surveillance performed   hand hygiene promoted   personal protective equipment utilized   rest/sleep promoted   single patient room provided   visitors restricted/screened  Goal: Optimal Comfort and Wellbeing  Outcome: Not Progressing  Intervention: Monitor Pain and Promote Comfort  Recent Flowsheet Documentation  Taken 9/5/2024 2126 by Bethany Romero RN  Pain Management Interventions: (alerted MD that patient is still in pain.)   repositioned   MD notified (comment)  Taken 9/5/2024 2025 by Bethany Romero RN  Pain Management Interventions: declines  Taken 9/5/2024 1934 by Bethany Romero RN  Pain Management Interventions:   medication (see MAR)    MD notified (comment)  Taken 9/5/2024 1608 by Bethany Romero RN  Pain Management Interventions: (declined heat) repositioned  Goal: Readiness for Transition of Care  Outcome: Not Progressing     Problem: ARDS (Acute Respiratory Distress Syndrome)  Goal: Effective Oxygenation  Outcome: Not Progressing     Problem: Pain Acute  Goal: Optimal Pain Control and Function  Outcome: Not Progressing  Intervention: Develop Pain Management Plan  Recent Flowsheet Documentation  Taken 9/5/2024 2126 by Bethany Romero RN  Pain Management Interventions: (alerted MD that patient is still in pain.)   repositioned   MD notified (comment)  Taken 9/5/2024 2025 by Bethany Romero RN  Pain Management Interventions: declines  Taken 9/5/2024 1934 by Bethany Romero RN  Pain Management Interventions:   medication (see MAR)   MD notified (comment)  Taken 9/5/2024 1608 by Bethany Romero RN  Pain Management Interventions: (declined heat) repositioned  Intervention: Prevent or Manage Pain  Recent Flowsheet Documentation  Taken 9/5/2024 2000 by Bethany Romero RN  Medication Review/Management:   medications reviewed   high-risk medications identified  Taken 9/5/2024 1600 by Bethany Romero RN  Medication Review/Management:   medications reviewed   high-risk medications identified   Goal Outcome Evaluation:

## 2024-09-06 NOTE — PROVIDER NOTIFICATION
"  [facility] txt page to DR. Mayen  Provider notified about pt BS level and episode of hypoglycemia.       Read - 1:42 am  Pts blood sugar 68  FK  Read - 1:42 am  Can you put in orders for iv meds she's NPO  FK  Read - 2:02 am  ?  JM Mayen - 2:07 am  Working on this along with some more urgent issues on other patients. Thx.  JM Mayen - 2:11 am  Can you tell me which meds you think are necessary to change to IV that are currently Oral? I don't see any that are necessary or able to be changed to IV administration.  JM Mayen - 2:11 am  thx.  JM Mayen - 2:12 am  what - is there a specific med that she needs as a new med? Pain meds? antibiotics?  JM Mayen - 2:12 am  cardiac meds?  ELISA  Read - 2:20 am  Get blood sugar is low it's 68 she needs dextrose  FK  Read - 2:21 am  She's NPO  FK  Read - 2:23 am  PRN I've dextrose  JM Mayen - 2:23 am  what do you mean \"get blood sugar is low\" ... please be more specific so that I know what you're asking and needing.  FK  Read - 2:23 am  Her blood sugar is 68  FK  Read - 2:24 am  Can you just call me  JM Mayen - 2:24 am  I'll call you on your phone - what is your direct number?  ELISA  Read - 2:25 am  [phone]  ELISA  Read - 4:20 am  Just wanted to let you know her D5 was started about a hour ago her blood sugar is now 62  FK  Read - 4:23 am  Can you put in a order for iv dextrose push 50%  JM Mayen - 4:23 am  Yes  FK  Read - 4:24 am  Thanks      "

## 2024-09-06 NOTE — PROGRESS NOTES
PGY5 GYN ONC Brief Progress Note    Called by primary team regarding new onset vaginal bleeding. Heparin gtt being held now. Hgb 7.4 as of 0830. HR 70-80, -110s/50-70s, hypothermic, respiratory status stable on Bibap that she has been on.     Per RN soaked 4 ABD pads in the last hours, and is more pale.     On exam, large golf ball sized clot at introitus, and about 100cc on sebastian underneath patient. Exam extremely limited by inability to position patient due to pain in extremities.     SSE: cervix deviated to the right that appears hyperemic, with some purple discoloration and enlarged. Clot within the vaginal vault with active bleeding, but unclear on the exact source given poor visualization but appears to likely be from the external os.   Cervical biopsy obtained without difficulty.     Bimanual exam performed but limited, thickened left parametrium, enlarged cervix with friable tissue on the anterior lip and tissue within the dilated cervical os  Surgiflo placed around the cervical os. Tissue from os removed with clot and sent to pathology.     1 roll of vaginal packing placed    Vitals overall stable after completion of intervention. Discussed with patient concern of active vaginal bleeding with known PE and needing to balance her anticoagulation and vaginal bleeding.     Discussed with primary team to touch with IR in regards to possible IVC filter and thrombectomy given needing to hold heparin gtt.   Will consult rad onc for possible radiation for vaginal bleeding  Path sent to lab with plans to rush tissue for treatment guidance    In room to assess bleeding at 1100, bleeding improved half ABD pad with blood, no blood on sebastian or second ABD pad. Will continue to monitor.       Jesus Montiel MD  Gynecologic Oncology PGY-5  September 6, 2024 10:09 AM      Update:   Discussed UAE and potential IVC filter for patients bleeding. Pt currently NPO and hemodynamically stable. IR amenable to plan and to  touch base with primary team.     Jesus Montiel MD  Gynecologic Oncology, PGY-5  September 6, 2024 12:17 PM

## 2024-09-07 NOTE — PROGRESS NOTES
Brief progress note  4:26 PM    At bedside to see patient. Per RN report pt has been sitting on chair for about 1.5-2hrs. Since heparin drip was started ~11am RN has seen tablespoons amount of blood on chux.     Chux were inspected after transfer back to bed, about 50cc of pink/red discharge noted.     Pt on 6L O2 mask  Lower extremities exquisitely tender to palpation    Vitals:    09/07/24 1215 09/07/24 1500 09/07/24 1522 09/07/24 1626   BP: 109/50      BP Location: Left arm      Pulse: 91      Resp: 22      Temp: 98.1  F (36.7  C)      TempSrc: Oral      SpO2: 93% (!) 89% 91% 94%   Weight:       Height:         Patient now s/p heparin ~4hrs with minimal vaginal bleeding. Would expect some bleeding to continue to occur from malignant tissue. Overall bleeding is overall reassuring, vitals stable. Will continue to follow.     Sammie Gambino MD  Obstetrics and Gynecology, PGY2  09/07/2024, 5:58 PM

## 2024-09-07 NOTE — PROGRESS NOTES
Brief Medicine Cross-Cover Note:    Followed up labs which are stable from previous aside from slight increase in WBC likely stress demargination after surgery. Troponin flat. Discussed with IR who were planning to hold heparin drip tonight and consider resuming in the morning. Qtc ~511 so prolonging medication placed on hold. Add on magnesium to labs. Recheck EKG in the morning for qtc monitoring. She is on telemetry.    Chuck Alcaraz PA-C on 9/6/2024 at 10:00 PM

## 2024-09-07 NOTE — PROGRESS NOTES
Brief Medicine Progress Note      Notified by Dr Baker, pathology resident that cervical biopsy results will be available Monday 9/10 due to issue with processing specimen preventing result today.      Nelida Arevalo PA-C, [facility]  Hospitalist Service   [facility]  Securely message with Vocera   See AMCOM signed in provider for up to date coverage information

## 2024-09-07 NOTE — PROGRESS NOTES
PGY5 GYN ONC Brief Progress Note    In room to see patient this AM. Doing well, still on bipap, only minimal spotting since UAE and IVC filter placement. Denies abdominal pain, is having discomfort from the vaginal packing.     Labs reviewed, Hgb stable at 8.2 last night, now 7.4 this AM. VSS.     Vaginal packing removed at bedside with only small amount of blood on the sebastian below patient and along her groin area. No notable active bleeding.     Plan:  - monitor bleeding  - Followup pathology from tissue collected yesterday  - okay to restart anticoagulation per primary team  - Expect some intermittent bleeding, but discussed with nursing if saturating two pads in an hour to contact team or if becomes hemodynamically unstable    Pt seen and discussed with Dr. Carol Montiel MD  Gynecologic Oncology PGY-5  September 7, 2024 8:17 AM

## 2024-09-07 NOTE — PLAN OF CARE
"A+Ox4, lethargic at start of shift becoming more alert mid shift. BP's soft but improving. SR on tele. Sats high 90's on BiPap, quickly desats on RA. Unable to wean to oxymask (slow downtrend and not able to sustain sats above 90). Mepilex applied to bridge of nose for non blanchable area under mask. Mask removed and replaced when repositioning patient and for drinking water and gingerale.  Miller in place. Vaginal packing slightly exposed and small amount of sanguinous draiage present. No becky bleeding or clots. Pericares performed. Right groin site and Rt IJ incision C/D/I, soft and nontender. Distal pulses easily obtained with doppler. +2 to +3 generalized edema. PRN morphine administered x2 and patient repositioned frequently for generalized back, hip and buttock pain (8/10). BG's stable. Bed alarm in place, POC maintained.     Problem: Adult Inpatient Plan of Care  Goal: Plan of Care Review  Description: The Plan of Care Review/Shift note should be completed every shift.  The Outcome Evaluation is a brief statement about your assessment that the patient is improving, declining, or no change.  This information will be displayed automatically on your shift  note.  Outcome: Not Progressing  Goal: Patient-Specific Goal (Individualized)  Description: You can add care plan individualizations to a care plan. Examples of Individualization might be:  \"Parent requests to be called daily at 9am for status\", \"I have a hard time hearing out of my right ear\", or \"Do not touch me to wake me up as it startles  me\".  Outcome: Not Progressing  Goal: Absence of Hospital-Acquired Illness or Injury  Outcome: Not Progressing  Intervention: Identify and Manage Fall Risk  Recent Flowsheet Documentation  Taken 9/7/2024 0345 by Fanny Jacobs, RN  Safety Promotion/Fall Prevention: safety round/check completed  Taken 9/7/2024 0000 by Fanny Jacobs, RN  Safety Promotion/Fall Prevention: safety round/check completed  Intervention: Prevent " Skin Injury  Recent Flowsheet Documentation  Taken 9/7/2024 0547 by Fanny Jacobs RN  Body Position:   turned   right  Taken 9/7/2024 0433 by Fanny Jacobs RN  Body Position: (head lowered, pillows adjusted) other (see comments)  Taken 9/7/2024 0345 by Fanny Jacobs RN  Body Position:   turned   left  Taken 9/7/2024 0200 by Fanny Jacobs RN  Body Position:   turned   right  Taken 9/7/2024 0000 by Fanny Jacobs RN  Body Position:   turned   supine   supine, head elevated  Taken 9/6/2024 2300 by Fanny Jacobs RN  Body Position:   turned   right  Taken 9/6/2024 2100 by Fanny Jacobs RN  Body Position:   turned   left  Taken 9/6/2024 2005 by Fanny Jacobs RN  Body Position:   right   turned  Skin Protection:   adhesive use limited   incontinence pads utilized   protective footwear used   skin to device areas padded  Device Skin Pressure Protection:   absorbent pad utilized/changed   adhesive use limited   tubing/devices free from skin contact  Intervention: Prevent and Manage VTE (Venous Thromboembolism) Risk  Recent Flowsheet Documentation  Taken 9/6/2024 2005 by Fanny Jacobs RN  VTE Prevention/Management: compression stockings off  Intervention: Prevent Infection  Recent Flowsheet Documentation  Taken 9/7/2024 0345 by Fanny Jacobs RN  Infection Prevention:   environmental surveillance performed   equipment surfaces disinfected   hand hygiene promoted  Taken 9/7/2024 0000 by Fanny Jacobs RN  Infection Prevention:   environmental surveillance performed   equipment surfaces disinfected   hand hygiene promoted  Taken 9/6/2024 2005 by Fanny Jacobs RN  Infection Prevention:   environmental surveillance performed   equipment surfaces disinfected   hand hygiene promoted  Goal: Optimal Comfort and Wellbeing  Outcome: Not Progressing  Intervention: Monitor Pain and Promote Comfort  Recent Flowsheet Documentation  Taken 9/7/2024 0547 by Fanny Jacobs RN  Pain Management Interventions:    repositioned   pillow support provided  Taken 9/7/2024 0433 by Fanny Jacobs RN  Pain Management Interventions:   medication (see MAR)   repositioned  Taken 9/6/2024 2110 by Fanny Jacobs RN  Pain Management Interventions: medication (see MAR)  Taken 9/6/2024 2005 by Fanny Jacobs RN  Pain Management Interventions: medication (see MAR)  Goal: Readiness for Transition of Care  Outcome: Not Progressing     Problem: ARDS (Acute Respiratory Distress Syndrome)  Goal: Effective Oxygenation  Outcome: Not Progressing  Intervention: Optimize Oxygenation, Ventilation and Perfusion  Recent Flowsheet Documentation  Taken 9/7/2024 0547 by Fanny Jacobs RN  Head of Bed (HOB) Positioning: HOB at 20-30 degrees  Taken 9/7/2024 0000 by Fanny Jacobs RN  Head of Bed (HOB) Positioning: HOB at 20-30 degrees  Taken 9/6/2024 2300 by Fanny Jacobs RN  Head of Bed (HOB) Positioning: HOB at 20-30 degrees  Taken 9/6/2024 2100 by Fanny Jacobs RN  Head of Bed (HOB) Positioning: HOB at 20-30 degrees  Taken 9/6/2024 2005 by Fanny Jacobs RN  Lung Protection Measures: fluid excess minimized  Airway/Ventilation Management:   airway patency maintained   calming measures promoted  Head of Bed (HOB) Positioning: HOB at 20-30 degrees     Problem: Pain Acute  Goal: Optimal Pain Control and Function  Outcome: Not Progressing  Intervention: Develop Pain Management Plan  Recent Flowsheet Documentation  Taken 9/7/2024 0547 by Fanny Jacobs RN  Pain Management Interventions:   repositioned   pillow support provided  Taken 9/7/2024 0433 by Fanny Jacobs RN  Pain Management Interventions:   medication (see MAR)   repositioned  Taken 9/6/2024 2110 by Fanny Jacobs RN  Pain Management Interventions: medication (see MAR)  Taken 9/6/2024 2005 by Fanny Jacobs RN  Pain Management Interventions: medication (see MAR)  Intervention: Prevent or Manage Pain  Recent Flowsheet Documentation  Taken 9/7/2024 0345 by Fanny Jacobs RN  Medication  Review/Management:   medications reviewed   high-risk medications identified  Taken 9/7/2024 0000 by Fanny Jacobs, RN  Medication Review/Management:   medications reviewed   high-risk medications identified  Taken 9/6/2024 2005 by Fanny Jacobs RN  Medication Review/Management:   medications reviewed   high-risk medications identified  Intervention: Optimize Psychosocial Wellbeing  Recent Flowsheet Documentation  Taken 9/6/2024 2005 by Fanny Jacobs RN  Supportive Measures:   active listening utilized   self-care encouraged   self-responsibility promoted     Problem: Anemia  Goal: Anemia Symptom Improvement  Outcome: Progressing  Intervention: Monitor and Manage Anemia  Recent Flowsheet Documentation  Taken 9/7/2024 0345 by Fanny Jacobs, RN  Safety Promotion/Fall Prevention: safety round/check completed  Taken 9/7/2024 0000 by Fanny Jacobs RN  Safety Promotion/Fall Prevention: safety round/check completed   Goal Outcome Evaluation:

## 2024-09-07 NOTE — PROGRESS NOTES
A/P:    Postprocedural day 1 of uterine artery embolization and IVC filter placement.  Vital signs are within normal limits.  Minimal spotting.  Denies abdominal pain or pain at the access site.  Dressing of right femoral access site CDI.  Symmetric pulses.  Hemoglobin stable.  No transfusion overnight.    Plan:  1.  Okay to reinitiate anticoagulation.    Patient was seen and discussed with Dr. Finley.    Bernarda Barakat MD

## 2024-09-07 NOTE — PROGRESS NOTES
Brief Medicine Note:    Patient noted to have prolonged Qtc on telemetry earlier this evening as well as sinus tachycardia. EKG obtained and Qtc calculates more to 429-483. Patient seen at bedside -- no chest pain and breathing feels similar to prior. No further bleeding. Did have a couple low blood pressure readings while in the room but recheck was improved. HR had improved.     Given tenuous status of patient - will just check some updated labs now - CBC, BMP, lactic, trop.    Patient inquiring about diet - assuming she was NPO for procedure today. Previously was on clear liquid on admission but not clear as to why. Will advance as tolerated.    Tyra Vásquez PA-C  Internal Medicine [facility] Hospitalist  [facility]

## 2024-09-07 NOTE — PROGRESS NOTES
Brief Progress Note    MD to bedside for PM check in. Patient sleeping on entry. Amenable to checking pads for bleeding. On exam no bleeding seen on pad between legs or below patient. Left patient to rest.    Arsh Lackey MD  [facility]  Gynecology Oncology Resident, PGY-2  09/06/2024 8:57 PM    To reach the GYNECOLOGY ONCOLOGY team for this patient, please page [phone]

## 2024-09-07 NOTE — PROVIDER NOTIFICATION
Earl text page to Chuck Oliviarodoandrea:    Read - 7:02 pm  Since pt came back from IR, her HR 110s and QTc >500. Want an EKG?  RR  Read - 7:25 pm  EKG done. She is sinus tach now and QTc is >500. Tele orders were discontinued before the IR procedures. Do you want tele back on?    MD put in orders for EKG and cardiac monitoring right away

## 2024-09-07 NOTE — PLAN OF CARE
Temp: 97.3  F (36.3  C) Temp src: Axillary BP: 109/57 Pulse: 106   Resp: 13 SpO2: 96 % O2 Device: BiPAP/CPAP 40% FiO2    [id number]: Pt returned from IR after UAE with a R groin incision site. Groin soft, tegaderm intact, and pedal pulses +2 with doppler.  Q 15 and Q30 checks completed.  Pt came back on tele in Sinus Tachy HR 110s.  Qtc >500.  MD notified and EKG completed.  Tele order placed back on.  Minimal blood noted from vaginal packing.  Latest Hg 7.5.  Pt remains on BiPAP at 40% FiO2.  Turned Q2.  Miller intact.  BG Q4 .  D5 1/2 NS infusing at 50 ml/hr.  IV Vanco and Cefepime started.  Pt sleepy, but rousable to voice/gentle touch.  Wife at bedside until 1900.

## 2024-09-07 NOTE — PROGRESS NOTES
Brief Medicine Progress Note    Notified by RN that patients HR has been sustaining 115-125 since getting up to chair around 1430, asx. O2 stable on 6L NC with BP. She denies palpitations, chest pain, worsening of respiratory status. Generally appears tired and ill. She reports some thigh pain but otherwise no acute complaints. Minimal amount of bleeding vaginally this afternoon.    Physical Exam  Vitals reviewed.   Constitutional:       General: She is not in acute distress.     Appearance: She is ill-appearing.   Cardiovascular:      Rate and Rhythm: Regular rhythm. Tachycardia present.      Heart sounds: No murmur heard.  Pulmonary:      Effort: No respiratory distress.      Breath sounds: No wheezing, rhonchi or rales.      Comments: Mildly increased effort, 10L oxymask  Skin:     General: Skin is warm and dry.   Neurological:      General: No focal deficit present.      Mental Status: She is alert.       -- EKG with similar presentation to previous, mild , Qtc improved to 486  -- CBC with increase in WBC to 26.5, Hgb improved to 8.1  -- BMP with Na 140, K 4.1, Cr stable at 0.99  -- Troponin 37, flat trend compared to yesterday    Assessment/Plan  -  Monitor for worsening tachycardia, symptoms such as chest pain, dyspnea/SOB or worsening respiratory status    Nelida Arevalo PA-C, [facility] [place]ospitalist Service   [facility]  Securely message with Earl SCRUGGS signed in provider for up to date coverage information

## 2024-09-07 NOTE — PLAN OF CARE
BP soft in 90s (systolic) over 50s (diastolic) this morning. Other VSS stable.     Circulatory: Potassium electrolyte protocol but no replacements needed. Telemetry continued today.    Contacted tele hub regarding arrhythmias. Assurance of similar previous rhythm.     This afternoon, pt had episode of sustained tachycardia with PVCs. Provider notified, stat EKG done and cardiac labs ordered.    Respiratory: Pt on BiPAP when sleeping, which during waking hours, was able to wean to Oxymask (6-10LPM) and nasal canula (6LPM) particularly for when eating today. Work of breathing noted with use of abdominal muscles.   GI/: Low urine output. No BM today. Senna and Miralax PRN given x1.   Diet/appetite: Regular diet. Provider notified of stable BG, then provider ordered discontinued BG checks and D5 drip.  Activity:  Lift assist, turned frequently. Into chair today to eat lunch. Patient was desating while on NC, so had to switch soon after eating back to the Oxymask.  Pain: Patient sensitive to touch on bilateral LE/calves. Morphine given most recently at 1630, able to give Q2H PRN.  Skin: Blanchable area on bridge of nose (where BiPAP is placed) has become non-blanchable. Provider notified, WOC consult ordered. Pt overall very pale. No other new deficits.  LDA's: R PIV x2, one saline locked and one infusing.     GYN/ONC removed vaginal packing this morning, waiting on biopsy results. Pt had slow bleeding, nowhere near parameters of GYN/ONC to call if 2 ABDs saturated within one hour.  Heparin restarted today, will continue to monitor for vaginal bleeding.     Lab had 2 people attempt to draw blood for 1800 labs. Unable to access - called vascular for help. Unable to titrate heparin without labs.     Plan: Wait for biopsy results (likely Monday). Continue POC. Notify primary team with changes.      Problem: Adult Inpatient Plan of Care  Goal: Optimal Comfort and Wellbeing  Outcome: Progressing  Intervention: Monitor Pain  and Promote Comfort  Flowsheets (Taken 9/7/2024 1748)  Pain Management Interventions: (MD present when pt in pain, and medication updated to more frequent per MD)   repositioned   pillow support provided   emotional support   medication (see MAR)   MD notified (comment)  Intervention: Provide Person-Centered Care  Flowsheets (Taken 9/7/2024 1748)  Trust Relationship/Rapport:   care explained   emotional support provided   empathic listening provided   questions answered   questions encouraged     Problem: ARDS (Acute Respiratory Distress Syndrome)  Goal: Effective Oxygenation  Outcome: Progressing  Intervention: Optimize Oxygenation, Ventilation and Perfusion  Flowsheets  Taken 9/7/2024 1748  Lung Protection Measures: fluid excess minimized  Airway/Ventilation Management:   airway patency maintained   calming measures promoted  Head of Bed (HOB) Positioning:   HOB at 60-90 degrees   HOB at 20-30 degrees   HOB at 30-45 degrees  Taken 9/7/2024 1522  Head of Bed (HOB) Positioning: HOB at 30-45 degrees  Taken 9/7/2024 1109  Head of Bed (HOB) Positioning: HOB at 30-45 degrees  Taken 9/7/2024 0810  Head of Bed (HOB) Positioning: HOB at 20-30 degrees  Taken 9/7/2024 0757  Airway/Ventilation Management:   airway patency maintained   calming measures promoted

## 2024-09-07 NOTE — PROGRESS NOTES
[facility]    Medicine Progress Note - Hospitalist Service, GOLD TEAM 1    Date of Admission:  9/4/2024    Assessment & Plan   Sherwin Benites is a 68 year old female admitted on 9/4/2024. She has hx of chronic resp failure 2/2 COPD/RICKY, morbid obesity, chronic vaginal bleeding who presented to ED with failure to thrive picture and SOB and found to have DVT/PE, cervical mass and severe anemia.     Interim history  Patient having a lot of pain this morning following U.A.E. yesterday. Also had some breakfast this AM but had to stop eating due to pain. She was weaned to nasal cannula.     Today  Wean oxygen as tolerated   Restart heparin drip   Hemoglobin every 6 hours   Increase morphine to 1 mg every 2 hours prn  Follow up biopsy results     # Bilateral segmental and subsegmental PE sp IVC filter 9/6  # RLE DVT  # Acute on chronic respiratory failure   # Hx of RICKY on home nocturnal BiPaP.  # Chronic oxygen dependence (4L?)  #  Leukocytosis   # HFpEF  - Pt presented with SOB, found to be hypoxic by paramedics, CT PE revealed b/l PE, no CT e/o right heart strain, however, does have elevated BNP. ED contacted Hematology, ok to continue heparin gtt without bolus, transfuse and monitor   - 9/5/2024 patient's respiratory status worsened requiring BiPAP. Rapid called. CXR with mild increase in suprahilar streaky opacities and bronchial wall thickening. White count is up to 20.4. VBG 7.29/48. Troponin is 36. Stat echo with flattened septum, right ventricular pressure, moderate right ventricular dilation, mild reduction in ventricular function. Per cards fellow IVF fairly flat. Lactic acid wnl.   - etiology may be multifactorial. PE may be contributing but overall clot burden not that impressive so unclear if worsening respiratory status is attributed solely to PE. No wheezing or concern for COPD exacerbation. Low concern for infection at this time with suspicion that leukocytosis  is likely reacted. TACO is a possibility given recent transfusions. However per CARDS fellow IVC is flat. Need to be careful with diuresis as she is preload dependent with PE.   - Of note, PERT team was activated in the ED. No indication for TPA or embolectomy at this time.   - 9/6. Heparin drip on hold due to severe bleeding.   - 9/6 IVC filter replaced by IR  - 9/7: heparin restarting now that bleeding controlled   - of note, patient was discharged on oxygen after pneumonia in 2013. And still may have oxygen at home though unclear how much she uses, or what the indication for supplemental oxygen would be. She carries a diagnosis of COPD but is a non-smoker.   Plan  - resume heparin drip today  - plan for IR placement IVC filter today  - continue BiPAP at bedtime  - continue oxygen during day; wean as able and will need to clarify home oxygen needs prior to discharge; if requires escalation would trial hi ramona during the day  - hold diuresis per cardiology for now. If ongoing oxygen requirement may consider gentle lasix  - vancomycin/cefepime (D1) 9/6 for possible pneumonia  - [facility] status    # Cervical mass concerning for malignancy sp biopsy with gyn/onc 9/6  # Chronic vaginal bleeding sp uterine artery embolization with IR on 9/6  # Acute on chronic blood loss anemia 2/2 vaginal bleeding   # suspicious left supraclavicular, lower right para-aortic and left retrocaval regoin adenopathy that is concerning for malignancy   - Pt also c/o abdominal pain and given severity of anemia CT A/P was obtained which revealed large cervical mass. GynOnc recommended medicine admission given medical complexity  - presented with hemoglobin of    - Gyn-onc following: speculum exam with biopsy 9/6  Plan  - follow up biopsy results  - Expect some intermittent bleeding, but discussed with nursing if saturating two pads in an hour to contact team or if becomes hemodynamically unstable   - monitor hemoglobin every 6 hours  - morphine IV 1  "mg every 2 hours for post op pain     # Generalized weakness  # Failure to thrive   # Weight loss   # Chronic abdominal pain, post prandial   - patient reports > 1 year of post prandial abdominal pain. Have not been able to further assess yet due to acute issues, but ultimately if she is not able to tolerate PO this will need to be investigated and we will need to assess nutrition.  - possibly could be related to cancer vs other (I.E. peptic ulcer)  Plan  - continue to monitor   - nutrition consult   - [id number] while either not eating or while NPO     # MEENAKSHI vs CKD  - Pt's Cr is 1.53, most recent is 0.75 in 2021.   - improved somewhat with fluids  Plan  - continue to monitor      # Hx of RICKY  -She is on oxygen during the day and BiPAP at night              Diet: Snacks/Supplements Adult: [employer] Standard Oral Supplement; With Meals  Regular Diet Adult    DVT Prophylaxis: heparin drip   Miller Catheter: PRESENT, indication: Gross hematuria;Insertion difficulty  Lines: None     Cardiac Monitoring: ACTIVE order. Indication: QTc prolonging medication (48 hours)  Code Status: Full Code      Clinically Significant Risk Factors              # Hypoalbuminemia: Lowest albumin = 2.4 g/dL at 9/6/2024  8:48 PM, will monitor as appropriate  # Coagulation Defect: INR = 1.40 (Ref range: 0.85 - 1.15) and/or PTT = N/A, will monitor for bleeding              # Severe Obesity: Estimated body mass index is 42.09 kg/m  as calculated from the following:    Height as of this encounter: 1.676 m (5' 6\").    Weight as of this encounter: 118.3 kg (260 lb 12.9 oz)., PRESENT ON ADMISSION  # Severe Malnutrition: based on nutrition assessment, PRESENT ON ADMISSION                Disposition Plan     Medically Ready for Discharge: Anticipated in 2-4 Days           The patient's care was discussed with the medicine attending    WAQAS GuzmanC  Hospitalist Service, GOLD TEAM 1  [facility]  Securely message with [employer] (more info)  Text page via [employer] Paging/Directory   See signed in provider for up to date coverage information  ______________________________________________________________________    Interval History   See above    Physical Exam   Vital Signs: Temp: 97.4  F (36.3  C) Temp src: Axillary BP: 111/54 Pulse: 91   Resp: 21 SpO2: 99 % O2 Device: BiPAP/CPAP Oxygen Delivery: 13 LPM  Weight: 260 lbs 12.87 oz    General Appearance: Alert, no acute distress   HEENT: Anicteric sclera, MMM   Respiratory: Lungs CTAB, No wheezing , no crackles,   Cardiovascular: Regular rate , Regular rhythm , no murmur   GI: Abdomen soft, non-tender, active bowel sounds. No guarding or rebound  Skin: No rash or jaundice on exposed skin   Musculoskeletal: No lower extremity edema   Neurologic: Oriented X 3 , CN II-XII grossly intact, Moves extremities equally       Medical Decision Making       60 MINUTES SPENT BY ME on the date of service doing chart review, history, exam, documentation & further activities per the note.      Data   ------------------------- PAST 24 HR DATA REVIEWED -----------------------------------------------

## 2024-09-07 NOTE — PROVIDER NOTIFICATION
Notified Nelida LORA that heart rate has remained tachy after 2 hours up in recliner to eat and visit spouse. Another stat EKG was ordered along with some stat labs. Lab has made 3 attempts to draw blood and are now waiting for vascular team to come with doppler.  She also needs heparin 10 XA level drawn to evaluate heparin drip which was restarted at 1100, with no significant signs of vaginal bleeding. Otherwise vitals stable on O2 nasal canula 6 liters or oxymask 10 liters. Provider did come to floor to see patient and wants to wait for labs to evaluate further.

## 2024-09-07 NOTE — PROGRESS NOTES
Pt was found on AVAPS settings without any AVAPS order or current blood gas to follow. Notified RN, who spoke with the MD and clarified that pt is supposed to be on what bipap settings that are ordered. Placed pt on correct bipap settings I10/E5/r14/hww220% per order. No distress noted. RT to follow per protocol.

## 2024-09-08 NOTE — PROVIDER NOTIFICATION
"0340 Provider Coleman Mayen notified via Porph[employer] text \"hello, q2 hour morphine not controlling L hip pain, I gave it an hour ago and she is still rating pain 8/10, is there anything additional we can give her?\"     Provider ordered one time dose of 2mg morphine IV and increased prn morphine 1-2 mg q1hr   "

## 2024-09-08 NOTE — PHARMACY-ADMISSION MEDICATION HISTORY
Pharmacist Admission Medication History    Admission medication history is complete. The information provided in this note is only as accurate as the sources available at the time of the update.    Information Source(s): Patient did not report any medications prior to admission; Has not fill history of any medications available     Changes made to PTA medication list:  Added: None  Deleted: None  Changed: None    Medication History Completed By: Christophe Parra RPH 9/8/2024 11:28 AM    PTA Med List   Medication Sig Last Dose    ORDER FOR DME Please use BiPAP as directed by your sleep provider.  .  9/3/2024 at 2100       Detail Level: Detailed

## 2024-09-08 NOTE — PROGRESS NOTES
Brief Progress Note    To bedside to assess bleeding this morning. Minimal blood seen on pad between thighs.     Spoke with RN, updated on findings.    We will follow peripherally, plan to speak with patient when biopsies result, possibly tomorrow.    Arsh Lackey MD  [facility]  Gynecology Oncology Resident, PGY-2  09/08/2024 6:11 PM    To reach the GYNECOLOGY ONCOLOGY team for this patient, please page [phone]

## 2024-09-08 NOTE — PROCEDURES
[facility]    Double Lumen PICC Placement    Date/Time: 9/8/2024 1:30 PM    Performed by: JULIANA Ac  Authorized by: Cheikh Hammer PA-C  Indications: vascular access      UNIVERSAL PROTOCOL   Site Marked: Yes  Prior Images Obtained and Reviewed:  Yes  Required items: Required blood products, implants, devices and special equipment available    Patient identity confirmed:  Verbally with patient, arm band, provided demographic data, hospital-assigned identification number and anonymous protocol, patient vented/unresponsive  Patient was reevaluated immediately before administering moderate or deep sedation or anesthesia  Confirmation Checklist:  Patient's identity using two indicators, relevant allergies, procedure was appropriate and matched the consent or emergent situation and correct equipment/implants were available  Time out: Immediately prior to the procedure a time out was called    Universal Protocol: the Joint Commission Universal Protocol was followed    Preparation: Patient was prepped and draped in usual sterile fashion       ANESTHESIA    Anesthesia:  See MAR for details  Local Anesthetic:  Lidocaine 1% without epinephrine  Anesthetic Total (mL):  3        Preparation: skin prepped with ChloraPrep  Skin prep agent: skin prep agent completely dried prior to procedure  Sterile barriers: maximum sterile barriers were used: cap, mask, sterile gown, sterile gloves, and large sterile sheet  Hand hygiene: hand hygiene performed prior to central venous catheter insertion  Type of line used: PICC  Catheter type: double lumen  Lumen type: non-valved and power PICC  Lumen Identification: Purple and Red  Catheter size: 5 Fr  Brand: Bard  Lot number: [id number]  Placement method: venipuncture, MST, ultrasound and tip navigation system  Number of attempts: 1  Difficulty threading catheter: yes  Successful placement: yes  Orientation: right  Catheter to Vein (%):  40  Location: brachial vein (lateral)  Tip Location: SVC  Arm circumference: adults 10 cm  Extremity circumference: 38  Visible catheter length: 4  Total catheter length: 48  Dressing and securement: chlorhexidine patch applied, dressing applied, line secured, statlock, sterile dressing applied and transparent dressing  Post procedure assessment: blood return through all ports, free fluid flow and placement verified by x-ray  PROCEDURE Describe Procedure: Picc ok to use  Disposal: sharps and needle count correct at the end of procedure, needles and guidewire disposed in sharps container

## 2024-09-08 NOTE — PROGRESS NOTES
Lab attempted lab draw x2. Lab consulting Vascular access to draw patient.     Vascular access paged [phone] to follow up, needing 0200 Hep Xa level drawn to titrate level.

## 2024-09-08 NOTE — SIGNIFICANT EVENT
Significant Event Note    Time of event: 5:19 PM September 8, 2024    Description of event:  Patient's wife was transferring from her wheelchair and lock was not on. Had fall to floor, seems to have landed onto buttocks. No apparent injuries, denies any head strike and denies being on blood thinners. ANS at bedside with rapid team. Unable to get back in wheelchair so assisted back to chair. Offered transfer to the ED and wife declined. Recommended any new/evolving symptoms that presentation to the ED recommended.    Tyra Vásquez PA-C

## 2024-09-08 NOTE — PROGRESS NOTES
A/P:    Postprocedural day 2 of uterine artery embolization and IVC filter placement.  Vital signs are within normal limits.  Minimal spotting.  Denies abdominal pain or pain at the access site.  Dressing of right femoral access site CDI.  Symmetric pulses.  Hemoglobin stable.       Plan:  1.  Okay to continue anticoagulation per primary.    2.  IR to sign off. Please contact IR for coordination of IVC filter removal before discharge.     Patient was seen and discussed with Dr. Finley.    Bernarda Barakat MD

## 2024-09-08 NOTE — PROGRESS NOTES
Hematology Consult Progress Note    I reviewed the patient's history and pertinent medical records and examination findings on 9/8/2024.  I met with the patient and discussed my impression and recommendations.      EVENTS/SUBJECTIVE  She was quite uncomfortable when I met with her and was moaning (but could not articulate exactly where her pain was).  Her nurse reports that she is having a moderate amount of vaginal bleeding on the chucks (changing every 4-6 hours) and that this has not significantly increased since heparin was restarted.  She was on BiPAP when I met with her.    Reviewed notes from primary team, nursing, interventional radiology, gynecologic oncology.    LABORATORY   Review of pertinent results and my independent interpretation as follows:  --Hemoglobin is 7.0 this morning which is down from 8.1 last evening but not significantly different compared to her hemoglobin of 7.4 yesterday morning  --Continued leukocytosis, WBC 24.9 today, likely reactive  --Normal platelet count at 266 today  --Heparin anti-Xa level 0.38 this morning on heparin drip (therapeutic)  --Creatinine stable at 0.99     RECOMMENDATIONS  --Continue heparin drip at this time given ongoing blood loss, hemodynamic fragility, and pending pathology from cervical biopsy; heparin requires intensive monitoring for toxicity (risk of bleeding)  --When she is more stable from a hemodynamic standpoint and when there is a plan in place from the GYN oncology perspective then can transition to oral anticoagulant (apixaban 5mg twice daily ideally) to be continued indefinitely  --Will need removal of IVC filter with IR once she can tolerate anticoagulation stably and no procedures are planned    Kayla Grimes MD  Date of Service: 9/8/2024

## 2024-09-08 NOTE — PROGRESS NOTES
[facility]    Medicine Progress Note - Hospitalist Service, GOLD TEAM 1    Date of Admission:  9/4/2024    Assessment & Plan   Sherwin Benites is a 68 year old female admitted on 9/4/2024. She has hx of chronic resp failure 2/2 COPD/RICKY, morbid obesity, chronic vaginal bleeding who presented to ED with failure to thrive picture and SOB and found to have DVT/PE, cervical mass and severe anemia.     Interim history  Patient very hard stick. Unsuccessful blood draws has been her main concern over past 24 hours to due to discomfort of poking attempts. Was up in chair last night which felt good for a while but her pain then was exacerbated and pain medications were escalating. Bleeding and hemoglobin stable. Tolerating oxy mask this morning. But overall looks tired, in pain. Ludmila, significant other will be stopping by later today.     Today  PICC line due to hard stick   Wean oxygen during day as tolerated  Continue nocturnal bipap (was using PTA)   Continue cefepime/vancomycin  MRSA swab  Oxycodone 5-10 mg every 4 hours prn   IV morphine 1-2 mg every 2 hours prn  Hemoglobin every 6 hours   Biopsy results should return tomorrow  Consult palliative some time in the next few days   PT/OT, SW/CM consult   Appreciate gyn/onc following     # Bilateral segmental and subsegmental PE sp IVC filter 9/6  # RLE DVT  # Acute on chronic respiratory failure   # Hx of RICKY on home nocturnal BiPaP  # Chronic oxygen dependence (4L?)  #  Leukocytosis   # HFpEF  # lower extremity edema  - Pt presented with SOB, found to be hypoxic by paramedics, CT PE revealed b/l PE, no CT e/o right heart strain, however, does have elevated BNP. ED contacted Hematology, ok to continue heparin gtt without bolus, transfuse and monitor   - 9/5/2024 patient's respiratory status worsened requiring BiPAP. Rapid called. CXR with mild increase in suprahilar streaky opacities and bronchial wall thickening. White count  is up to 20.4. VBG 7.29/48. Troponin is 36. Stat echo with flattened septum, right ventricular pressure, moderate right ventricular dilation, mild reduction in ventricular function. Per cards fellow IVF fairly flat. Lactic acid wnl.   - etiology may be multifactorial. PE may be contributing but overall clot burden not that impressive so unclear if worsening respiratory status is attributed solely to PE. No wheezing or concern for COPD exacerbation. Low concern for infection at this time with suspicion that leukocytosis is likely reacted. TACO is a possibility given recent transfusions. However per CARDS fellow IVC is flat. Need to be careful with diuresis as she is preload dependent with PE.   - Of note, PERT team was activated in the ED. No indication for TPA or embolectomy at this time.   - 9/6. Heparin drip on hold due to severe bleeding.   - 9/6 IVC filter replaced by IR  - 9/7: heparin restarting now that bleeding controlled   - of note, patient was discharged on oxygen after pneumonia in 2013. And still may have oxygen at home though unclear how much she uses, or what the indication for supplemental oxygen would be. She carries a diagnosis of COPD but is a non-smoker.   - Hematology is following peripherally   Plan  --Continue heparin drip at this time   -When she is more stable from a hemodynamic standpoint and when there is a plan in place from the GYN oncology perspective then can transition to oral anticoagulant (apixaban 5mg twice daily ideally) to be continued indefinitely  --Will need removal of IVC filter with IR once she can tolerate anticoagulation stably and no procedures are planned  - continue BiPAP at bedtime (was on PTA)   - continue oxygen during day; wean as able and will need to clarify home oxygen needs prior to discharge; if requires escalation would trial hi ramona during the day  - holding lasix for now given pre load dependence and soft pressures  - vancomycin/cefepime (D1) 9/6 for possible  pneumonia  - MRSA swab   - lymphedema consult     # Cervical mass concerning for malignancy sp biopsy with gyn/onc 9/6  # Chronic vaginal bleeding sp uterine artery embolization with IR on 9/6  # Acute on chronic blood loss anemia 2/2 vaginal bleeding   # suspicious left supraclavicular, lower right para-aortic and left retrocaval regoin adenopathy that is concerning for malignancy   - Pt also c/o abdominal pain and given severity of anemia CT A/P was obtained which revealed large cervical mass. GynOnc recommended medicine admission given medical complexity  - presented with hemoglobin of    - Gyn-onc following: speculum exam with biopsy 9/6  Plan  - follow up biopsy results (should return on Monday)  - monitor hemoglobin every 6 hours  - oxycodone 5-10 mg every 4 hours prn   - IV morphine 1-2 every mg every 2 hours prn  - palliative care consult next week      # Generalized weakness  # Failure to thrive   # Weight loss   # Chronic abdominal pain, post prandial   - patient reports > 1 year of post prandial abdominal pain. Have not been able to further assess yet due to acute issues, but ultimately if she is not able to tolerate PO this will need to be investigated and we will need to assess nutrition.  - possibly could be related to cancer vs other (I.E. peptic ulcer)  Plan  - continue to monitor   - nutrition consult   - [id number] while either not eating or while NPO     # Failure to thrive   - Of note, patient lives with significant other Ludmila. Per their report, patient was growing progressively weaker leading up to this admission. She was no longer able to navigate stairs at their place. She was becoming essentially bed bound.  Plan  - PT, OT, SW/CM consults     # Intermittent tachycardia   - 9/7 overnight tachycardic up to 130's. EKG showed bifascicular block. Troponin stable.   - continues with intermittent tachycardia  Plan  - continue tele monitoring for now     # MEENAKSHI vs CKD  - Pt's Cr is 1.53, most recent  "is 0.75 in 2021.   - improved somewhat with fluids  Plan  - continue to monitor      # Hx of RICKY  -She is on oxygen during the day and BiPAP at night              Diet: Snacks/Supplements Adult: Nargis Wright Standard Oral Supplement; With Meals  Regular Diet Adult    DVT Prophylaxis: heparin drip   Miller Catheter: PRESENT, indication: Acute retention or obstruction  Lines: None     Cardiac Monitoring: ACTIVE order. Indication: QTc prolonging medication (48 hours)  Code Status: Full Code      Clinically Significant Risk Factors              # Hypoalbuminemia: Lowest albumin = 2.4 g/dL at 9/6/2024  8:48 PM, will monitor as appropriate  # Coagulation Defect: INR = 1.40 (Ref range: 0.85 - 1.15) and/or PTT = N/A, will monitor for bleeding              # Severe Obesity: Estimated body mass index is 42.02 kg/m  as calculated from the following:    Height as of this encounter: 1.676 m (5' 6\").    Weight as of this encounter: 118.1 kg (260 lb 5.8 oz)., PRESENT ON ADMISSION  # Severe Malnutrition: based on nutrition assessment, PRESENT ON ADMISSION                Disposition Plan     Medically Ready for Discharge: Anticipated in 5+ Days           The patient's care was discussed with the medicine attending    Cheikh Hammer PAAlvarezC  Hospitalist Service, [facility] TEAM [address] [place] [address]  Securely message with [employer] (more info)  Text page via [employer] Paging/Directory   See signed in provider for up to date coverage information  ______________________________________________________________________    Interval History   See above    Physical Exam   Vital Signs: Temp: 97.2  F (36.2  C) Temp src: Axillary BP: 90/54 Pulse: 110   Resp: 18 SpO2: 100 % O2 Device: Oxymask Oxygen Delivery: 10 LPM  Weight: 260 lbs 5.81 oz    General Appearance: Alert  HEENT: Anicteric sclera, MMM   Respiratory: Lungs CTAB, No wheezing , no crackles,   Cardiovascular: Tachycardic, Regular rhythm , no murmur   GI: " diffuse abdominal tenderness   Skin: No rash or jaundice on exposed skin   Musculoskeletal: No lower extremity edema   Neurologic: Oriented X 3 , CN II-XII grossly intact, Moves extremities equally       Medical Decision Making       60 MINUTES SPENT BY ME on the date of service doing chart review, history, exam, documentation & further activities per the note.      Data   ------------------------- PAST 24 HR DATA REVIEWED -----------------------------------------------

## 2024-09-08 NOTE — PROVIDER NOTIFICATION
1715 patient's spouse Ludmila was visiting and when she stood briefly from her own wheelchair she forgot to lock the wheels and fell to the floor on her butt as she sat back down. NST was present and helping patient with vitals and called this RN for assist. Per ANS a rapid response was called. The RR team came to evaluate and recommended the ER. Ludmila declines visit to ER, replying she has no pain, did not hit head, and has no visible sign of injury. RRT lead gave her instruction to report to the ED if she develops any symptoms when she gets home.

## 2024-09-08 NOTE — PHARMACY-VANCOMYCIN DOSING SERVICE
Pharmacy Vancomycin Note  Date of Service 2024  Patient's  1955   68 year old, female    Indication: Sepsis  Day of Therapy: 3  Current vancomycin regimen:  1500 mg IV q24h  Current vancomycin monitoring method: AUC  Current vancomycin therapeutic monitoring goal: 400-600 mg*h/L    [employer] Prediction of Current Vancomycin Regimen  Regimen: 1500 mg IV every 24 hours.  Start time: 17:24 on 2024  Exposure target: AUC24 (range)400-600 mg/L.hr   AUC24,ss: 453 mg/L.hr  Probability of AUC24 > 400: 85 %  Ctrough,ss: 9.6 mg/L  Probability of Ctrough,ss > 20: 5 %  Probability of nephrotoxicity (Lodise PAUL ): 6 %    Current estimated CrCl = Estimated Creatinine Clearance: 71.1 mL/min (A) (based on SCr of 0.99 mg/dL (H)).    Creatinine for last 3 days  2024: 12:23 AM Creatinine 1.09 mg/dL; 12:50 AM Creatinine 1.07 mg/dL;  8:30 AM Creatinine 1.01 mg/dL;  8:48 PM Creatinine 1.01 mg/dL  2024:  6:30 AM Creatinine 1.00 mg/dL;  7:19 PM Creatinine 0.99 mg/dL  2024:  5:02 AM Creatinine 0.99 mg/dL    Recent Vancomycin Levels (past 3 days)  2024: 10:13 AM Vancomycin 13.1 ug/mL    Vancomycin IV Administrations (past 72 hours)                     vancomycin (VANCOCIN) 1,500 mg in 0.9% NaCl 250 mL intermittent infusion (mg) 1,500 mg New Bag 24 1724     1,500 mg New Bag 24 1823                    Nephrotoxins and other renal medications (From now, onward)      Start     Dose/Rate Route Frequency Ordered Stop    24 1300  vancomycin (VANCOCIN) 1,500 mg in 0.9% NaCl 250 mL intermittent infusion         1,500 mg  166.7 mL/hr over 90 Minutes Intravenous EVERY 24 HOURS 24 1243                 Contrast Orders - past 72 hours (72h ago, onward)      Start     Dose/Rate Route Frequency Stop    24 1330  iodixanol (VISIPAQUE 320) injection 150 mL         150 mL Intravascular ONCE 24 1643            Interpretation of levels and current regimen:  Vancomycin level is  reflective of -600    Has serum creatinine changed greater than 50% in last 72 hours: No    Urine output:  unable to determine    Renal Function: Stable    Plan:  Continue current dose of vancomycin 1500 mg IV every 24 hours.   Vancomycin monitoring method: AUC  Vancomycin therapeutic monitoring goal: 400-600 mg*h/L  Pharmacy will check vancomycin levels as appropriate in 1-3 Days.  Serum creatinine levels will be ordered daily for the first week of therapy and at least twice weekly for subsequent weeks.    Christophe Parra, DanielleD

## 2024-09-08 NOTE — PROGRESS NOTES
Revisited with patient and significant other Ludmila in the afternoon. Difficult balance between managing pain and alertness today. 10 mg of oxycodone seemed to sedating. Will reduce oral oxycodone to 5 mg every 4 hours prn and continue to have IV pain medicine available for breakthrough.     She does continue to endorse shortness of breath and sats are borderline on oxymask 10 LPM. Discussed with patient and rn and plan to trial Hi William during the day time with bipap at night. Now that her BP is better, will will initiate gentle diuresis as well.     PO intake poor with down-trending sugars, so restarted D5 half normal saline. Given lack of intake since admission nutrition may need to be addressed pending further goals of care discussions    Big picture, biopsy returns tomorrow and can have further discussions with surgical oncology, and palliative if needed. Remains full code for now. However, Shawanda states Sherwin previously was unsure whether she would want treatment such as chemo. Discussed if offered, it may be palliative only. Further GOC discussions pending biopsy results and specialist input.

## 2024-09-08 NOTE — PLAN OF CARE
"/53 (BP Location: Left arm)   Pulse 83   Temp 98.1  F (36.7  C) (Axillary)   Resp 18   Ht 1.676 m (5' 6\")   Wt 118.3 kg (260 lb 12.9 oz)   SpO2 97%   BMI 42.09 kg/m       A&O but forgetful. Afebrile. On tele, normal sinus rhythm/ sinus tach. 8/10 hip and back pain, prn morphine given x4 with some relief. No BM during shift. Light vaginal bleeding. On BIPAP overnight, nonblanchable redness on nose. Miller in place with minimal output. Hemoglobin 7.0,  type and screen done will need RBCs. Hep Xa 0.38, in therapeutic range, recheck due at 11:55. BG 70, ginger ale given, BG increased to 81.  Continue with POC.      Problem: Adult Inpatient Plan of Care  Goal: Plan of Care Review  Description: The Plan of Care Review/Shift note should be completed every shift.  The Outcome Evaluation is a brief statement about your assessment that the patient is improving, declining, or no change.  This information will be displayed automatically on your shift  note.  Outcome: Progressing  Goal: Absence of Hospital-Acquired Illness or Injury  Outcome: Progressing  Intervention: Identify and Manage Fall Risk  Recent Flowsheet Documentation  Taken 9/8/2024 0330 by Soco Avila RN  Safety Promotion/Fall Prevention: safety round/check completed  Taken 9/8/2024 0100 by Soco Avila RN  Safety Promotion/Fall Prevention: safety round/check completed  Intervention: Prevent Skin Injury  Recent Flowsheet Documentation  Taken 9/7/2024 2034 by Soco Avila, RN  Body Position:   turned   left  Skin Protection:   adhesive use limited   incontinence pads utilized   protective footwear used   skin to device areas padded  Device Skin Pressure Protection:   absorbent pad utilized/changed   adhesive use limited   tubing/devices free from skin contact  Intervention: Prevent Infection  Recent Flowsheet Documentation  Taken 9/8/2024 0330 by Soco Avila, RN  Infection Prevention:   environmental surveillance performed   hand hygiene " promoted   personal protective equipment utilized   rest/sleep promoted   single patient room provided   visitors restricted/screened  Taken 9/8/2024 0100 by Soco Avila RN  Infection Prevention:   environmental surveillance performed   hand hygiene promoted   personal protective equipment utilized   rest/sleep promoted   single patient room provided   visitors restricted/screened  Taken 9/7/2024 2034 by Soco Avila, RN  Infection Prevention:   environmental surveillance performed   hand hygiene promoted   personal protective equipment utilized   rest/sleep promoted   single patient room provided   visitors restricted/screened

## 2024-09-09 NOTE — CONSULTS
SPIRITUAL HEALTH SERVICES - Consult Note  [facility] ([place] 5C  Referral Source/Reason for Visit: Staff Request/Family Emotional Support    Summary and Recommendations -  Sherwin is currently not verbal. I talked with her wife, Ludmila, who is bedside.   Ludmila shared stories about their life and talked about how she and Sherwin met.   Ludmila is Pentecostalism and Sherwin leans more toward menendez. They are open about their spiritual needs.   Ludmila appreciated having  visit and get to talk with someone. She says that she has been through this in the past and knows what to expect.   Ludmila is waiting to hear from medical team to find out what comes next. She says that she is grateful for the staff and the care they are giving to Sherwin.    Plan: [facility] is available to return upon request.  Mary will follow up as able.    Mary Meyers  Staff

## 2024-09-09 NOTE — CONSULTS
[facility] Nurse Inpatient Assessment     Consulted for: bridge of nose from Bipap mask      Patient History (according to provider note(s):      Sherwin Benites is a 68 year old female admitted on 9/4/2024. She has hx of chronic resp failure 2/2 COPD/RICKY, morbid obesity, chronic vaginal bleeding who presented to ED with failure to thrive picture and SOB and found to have DVT/PE, cervical mass and severe anemia.     Assessment:      Areas visualized during today's visit: Focused: and Face and neck    Pressure Injury Location: Bridge of nose    Last photo: 9/9  Wound type: Pressure Injury     Pressure Injury Stage: Deep Tissue Pressure Injury (DTPI), hospital acquired      This is a Medical Device Related Pressure Injury (MDRPI) due to BiPAP mask  Wound history/plan of care:   Had a noted blanchable area of redness on the bridge of her nose that became non-blanching the evening of 9/7    Wound base: 100 % Non-blanchable, Maroon, and Epidermis,     Palpation of the wound bed: firm      Drainage: none     Description of drainage: none     Measurements (length x width x depth, in cm) 0.3  x 0.3  x  0 cm      Tunneling N/A     Undermining N/A  Periwound skin: Intact and Erythema- blanchable      Color: red      Temperature: normal   Odor: none  Pain: moderate,  only non verbal clues for pain  Pain intervention prior to dressing change: slow and gentle cares   Treatment goal: Heal  and Protection  STATUS: initial assessment  Supplies ordered: gathered and supplies stored on unit      Treatment Plan:     Nose wound(s): Every 3 days and PRN  Cleanse the area with NS and pat dry.  Apply No sting film barrier to periwound skin.  Cover wound with 4cm x 5cm Mepilex (#[id number]) (located in the black respiratory bins in unit supply room)    Orders: Written    RECOMMEND PRIMARY TEAM ORDER: None, at this time  Education provided: Not appropriate today   Discussed plan of care with:  Nurse  WO nurse follow-up plan: weekly  Notify WOC if wound(s) deteriorate.  Nursing to notify the Provider(s) and re-consult the WO Nurse if new skin concern.    DATA:     Current support surface: Standard  Low air loss (PETEY pump, Isolibrium, Pulsate)  Containment of urine/stool: Incontinence Protocol  BMI: Body mass index is 42.02 kg/m .   Active diet order: Orders Placed This Encounter      Regular Diet Adult     Output: I/O last 3 completed shifts:  In: 2008.5 [I.V.:1708.5]  Out: 1150 [Urine:1150]     Labs:   Recent Labs   Lab 09/09/24  0316 09/08/24  1949 09/06/24  0050 09/06/24  0023   ALBUMIN  --  2.3*   < >  --    HGB 7.5* 7.7*   < > 7.0*   INR  --   --   --  1.40*   WBC 25.7* 25.9*   < > 20.2*    < > = values in this interval not displayed.     Pressure injury risk assessment:   Sensory Perception: 2-->very limited  Moisture: 3-->occasionally moist  Activity: 1-->bedfast  Mobility: 2-->very limited  Nutrition: 1-->very poor  Friction and Shear: 2-->potential problem  Yoni Score: 11    Imelda Gomez RN CWOCN  Please contact through [place] group: [facility] Nurse [facility]  Dept. Office Number: [phone]

## 2024-09-09 NOTE — PROGRESS NOTES
Gynecologic Oncology Progress Note  9/9/2024    S: Patient obtunded. Spoke with patient's partner, Ludmila.    O:  Vitals:    09/09/24 0752 09/09/24 1234   BP: 117/56 106/51   BP Location: Left arm Left arm   Pulse: 79 75   Resp: 27 21   Temp: (!) 96.6  F (35.9  C) 97.6  F (36.4  C)   TempSrc: Axillary Axillary   SpO2: 98% 95%   Weight:     Height:         Gen: Obtunded. Occasional moaning. Not responsive to questions  Cardio: Reg. on tele  Resp: Bipap in place. RN working to secure seal. RT call to assist.    Abdomen: Obese. Soft.    A/P: 68 year old female with a new diagnosis of Radiographic Stage IVB endometrioid adenocarcinoma, grade II.    - Patient unable to participate in conversation.   - Spoke with patient's partner regarding pathology and likely advanced disease based on imaging. Discussed that due to patient's current clinical status she is not a candidate for cancer treatment at this time. However if her health improves she may be able to receive chemotherapy. Also discussed that if the patient does not improve we would recommend best supportive comfort care.   - Gyn Onc will continue to follow     Patient visit completed by Dr Brian Whitfield, APRN, DNP, CNP  9/9/2024 12:46 PM       I personally reviewed the diagnosis, prognosis, and management options with Sherwin's partner Ludmila. Discussed the only treatment option is chemotherapy, although in Sherwin's current state she is not well enough to receive chemotherapy. Discussed the overall advanced-stage disease, and poor prognosis, although the factor that is unknown is chemo-responsiveness. Discussed that often in advanced stage endometrial cancer a checkpoint inhibitor (e.g. pembrolizumab) is added to chemotherapy, but given Sherwin's baseline poor pulmonary status, I would not recommend adding immunotherapy due to risk of pulmonary fibrosis which in Sherwin's situation could be life-ending. Discussed that if chemotherapy were desired, that we would  need to see significant improvement in the clinical status, and reassess after discharge.   Given the advanced cancer and Sherwin's current poor clinical status, as well as multiple chronic co-morbidities which will limit performance status and potentially ability to tolerate chemotherapy which will further negatively impact efficacy of therapy, discussed option of best supportive care without anticancer therapy.    Ludmila and I discussed Sherwin's wishes, and desire to avoid formal healthcare up until she was sick enough to present for this admission. Ludmila expresses understanding of the overall poor prognosis, but is hopeful that we may be able to reverse Sherwin's current mental status enough that she can also participate in a goals of care conversation. Reviewed Ludmila's support system through this difficult time, which comprises her [place], priscila community and other friends.     Plan as follows:  -Will continue to follow Sherwin's clinical status, and continue discussions with Ludmila and Sherwin if able.   -If no significant clinical improvement in the next few days, then recommend transition to comfort care.   -If Sherwin's status significantly improves AND she and Ludmila desire to consider chemotherapy, will NOT administer as an inpatient, but will reassess clinical and performance status as an outpatient.      Sylvia Torres MD, MS, FACOG, FACS  9/10/2024  8:04 AM  Late entry; discussion 9/9/24

## 2024-09-09 NOTE — PROGRESS NOTES
[facility]    Medicine Progress Note - Hospitalist Service, GOLD TEAM 1    Date of Admission:  9/4/2024    Assessment & Plan   Sherwin Benites is a 68 year old female with past medical history significant for chronic respiratory failure 2/2 COPD, RICKY, morbid obesity, chronic vaginal bleeding who presented to the ED with failure to thrive and dyspnea and found to have acute PE, new cervical mass, and severe anemia.      # Bilateral segmental and subsegmental acute PE s/p IVC filter on 9/6   # RLE DVT   # Acute on chronic hypoxic respiratory failure   # Hx RICKY on BIPAP PTA   BIB EMS with dyspnea and hypoxia.  Per d/w wife Ludmila, typically uses 3L NC over the last year.  Recently has been needing 4 to 4.5L NC.  CT PE on admission with bilateral PE, no e/o R heart strain on CT.  BNP elevated to 23,140.  Started on heparin drip with close monitoring of Hgb as below.  Yesterday 9/8 was able to maintain sats with high flow during the day and BIPAP at night. Received gentle diuresis as well given stable BPs.  Has required BIPAP throughout the day and will attempt to wean tomorrow.   - VBGs in AM   - Will trial weaning to high flow with RT   - Spot dose diuretics   - Check NTproBNP in AM        - Continuous pulse ox   - Continue heparin gtt for now, monitor for s/s active bleed  - Daily CBC to monitor Hgb     # Cervical mass concerning for malignancy s/p biopsy (9/6)   # Chronic vaginal bleeding s/p uterine artery embolization (9/6)   # Acute on chronic blood loss anemia 2/2 vaginal bleeding   # Suspicious left supraclavicular, lower right paraaortic, and left retrocaval region adenopathy c/f malignancy   On admission pt c/o abdominal pain.  CBC on admission significant for Hgb 4.9 requiring PRBCs x 3 units.  Given severity of anemia, CT A/P was obtained which revealed large cervical mass. Gyn Onc consulted.  Notes that she has had consistent non-cyclical bleeding through 50s and  60s.  Had speculum exam and cervical bx on 9/6, and underwent UAE and IVC filter placement on 9/6 w/ IR.  Pathology returned today with adenocarcinoma of endometrioid cell type, FIGO grade 2, and fragments of endocervical mucosa invaded by the tumor.  Significant pain with repositioning.    - Gyn Onc consulted, appreciate recs; at this point, pt is not well enough to tolerate chemo. Would reconsider when medically improved.  This was discussed with pt's spouse Ludmila but not with the patient; will revisit again tomorrow if pt able to come off BIPAP.  - Palliative Care consulted for symptom management (discussed this w/ Ludmila and she is agreeable)   - Pain control for now:   - Oxycodone 5-10 mg every 4 hours prn   - IV morphine 1-2 every mg every 2 hours prn  - Hematology consulted, appreciate recs; eventually can transition to apixaban 5mg BID for PE/DVT treatment pending goals of care and clinical course    # Generalized weakness   # Failure to thrive   # Weight loss   # Chronic post prandial abdominal pain   # Hypoglycemia   Likely 2/2 cancer and anemia.  Has been NPO throughout the day d/t BIPAP requirements. BG dropped to 68 with overall downtrend despite treatment with D50.  Switched fluids to D10 at 50cc/hr.      - Nutrition consult   - Lymphedema consult     # Intermittent tachycardia   Improving.  9/7 overnight tachycardic up to 130's. EKG showed bifascicular block. Troponin stable.   - Continues with intermittent tachycardia  - Monitor     # MEENAKSHI vs CKD   Cr is 1.53, most recent is 0.75 in 2021.   - Currently stable at 1.19.            Diet: Snacks/Supplements Adult: [employer] Standard Oral Supplement; With Meals  Regular Diet Adult    DVT Prophylaxis: Heparin gtt   Miller Catheter: PRESENT, indication: Acute retention or obstruction  Lines: PRESENT      PICC 09/08/24 Double Lumen Right Brachial vein lateral-Site Assessment: WDL except;Bleeding      Cardiac Monitoring: ACTIVE order. Indication: QTc  "prolonging medication (48 hours)  Code Status: Full Code      Clinically Significant Risk Factors              # Hypoalbuminemia: Lowest albumin = 2.3 g/dL at 9/8/2024  7:49 PM, will monitor as appropriate               # Severe Obesity: Estimated body mass index is 42.02 kg/m  as calculated from the following:    Height as of this encounter: 1.676 m (5' 6\").    Weight as of this encounter: 118.1 kg (260 lb 5.8 oz).   # Severe Malnutrition: based on nutrition assessment                 Disposition Plan     Medically Ready for Discharge: Anticipated in 5+ Days           The patient's care was discussed with the Attending Physician, Dr. Jose Martin Nath .    Heather Garrett, [facility]  Hospitalist Service, GOLD TEAM [address] [place] [address]  Securely message with [employer] (more info)  Text page via [employer] Paging/Directory   See signed in provider for up to date coverage information  ______________________________________________________________________    Interval History   Sherwin is resting in bed.  Unable to obtain ROS due to respiratory status.       Physical Exam   Vital Signs: Temp: (!) 96.6  F (35.9  C) Temp src: Axillary BP: 117/56 Pulse: 79   Resp: 27 SpO2: 98 % O2 Device: BiPAP/CPAP Oxygen Delivery: 40 LPM  Weight: 260 lbs 5.81 oz    GENERAL: Well nourished, well developed.  Intermittently moaning in pain.   HEENT: Normocephalic, atraumatic. Anicteric sclera. Mucous membranes moist.   CV: RRR. S1, S2. Trace murmur.   RESPIRATORY: Effort normal on BIPAP. Lungs CTAB with no wheezing, rales, or rhonchi.   GI: Abdomen soft and non distended, bowel sounds present x all 4 quadrants. No tenderness, rebound, or guarding.   NEUROLOGICAL: No focal deficits.   MUSCULOSKELETAL: No joint swelling or tenderness. Moves all extremities.   EXTREMITIES: No gross deformities. Legs in bilateral edema wraps.   SKIN: Grossly warm, dry, and intact. No jaundice. No rashes.       Medical Decision Making "       65 MINUTES SPENT BY ME on the date of service doing chart review, history, exam, documentation & further activities per the note.      Data     I have personally reviewed the following data over the past 24 hrs:    25.7 (H)  \   7.5 (L)   / 272     139 110 (H) 33.0 (H) /  76   3.9 22 1.19 (H) \     ALT: 11 AST: 17 AP: 115 TBILI: 0.3   ALB: 2.3 (L) TOT PROTEIN: 5.2 (L) LIPASE: N/A       Imaging results reviewed over the past 24 hrs:   Recent Results (from the past 24 hour(s))   XR Chest Port 1 View    Narrative    EXAM:  XR CHEST PORT 1 VIEW    INDICATION: picc placement    COMPARISON:  Chest x-ray 9/7/2024, CT PE 9/4/2024    FINDINGS:  Single AP view of the chest. Right approach PICC terminates over the  cavoatrial junction.    Cardiomediastinal silhouette within normal limits. Bilateral mixed  perihilar opacities are slightly increased since prior. No  pneumothorax.  No pleural effusion.       Impression    IMPRESSION:  1.  Right approach PICC terminates over the cavoatrial junction.  2.  Slightly increased pulmonary edema.    I have personally reviewed the examination and initial interpretation  and I agree with the findings.    RADHIKA BROWN MD         SYSTEM ID:  [id number]   XR Chest Port 1 View    Impression    RESIDENT PRELIMINARY INTERPRETATION  Impression: Decreased perihilar and bibasilar streaky opacities,  likely atelectasis/edema.

## 2024-09-09 NOTE — PROGRESS NOTES
9/9/2024  3:53 AM    HOSPITAL MEDICINE NOTE  VBG very slightly improved pH and pCO2 on current BIPAP settings.  Will continue.    Steve Mayen MD  [phone] pager  [phone] mobile/cell

## 2024-09-09 NOTE — PROGRESS NOTES
"/51 (BP Location: Left arm)   Pulse 83   Temp 97.9  F (36.6  C) (Axillary)   Resp 21   Ht 1.676 m (5' 6\")   Wt 118.1 kg (260 lb 5.8 oz)   SpO2 98%   BMI 42.02 kg/m      Pt AVSS. On BIPAP at 35% FiO2 with O2 sats to 95-98%. Patient has been moaning even with slightest touch. Unable to rate pain. Given Morphine x3 this shift and also provided heating pad. Pain is on hip/back area. Repositioned every 1-2 hrs. ABD pads changed x1 with very little blood. Catheter intact. Miller care done.     Lymphedema wrap on bilateral LE. Toes pink and warm.   Lasix given prior to this shift. Had 600 ml UOP.    Has pressure injury on the bridge of her nose due to BIPAP. WOC came by, Mepilex applied.     This morning, pt have been hypoglycemic with BG to 68. Given D10 25ml x2. Provider notified and aware. Was check q 15 mins. Following BG closely. Current BG is 81.    On Heparin gtt with a rate of 2250 unit/hr. Hep 10A checks x2 have been therapeutic. Will recheck in am.     Ludmila (partner) at bedside. Provider discussed biopsy results with her. Provided emotional support.     Continue with POC. Notify Provider for any status changes.   "

## 2024-09-09 NOTE — PLAN OF CARE
"BP 98/50   Pulse 82   Temp 96.8  F (36  C) (Axillary)   Resp (!) 9   Ht 1.676 m (5' 6\")   Wt 118.1 kg (260 lb 5.8 oz)   SpO2 96%   BMI 42.02 kg/m       Somnolent at beginning of shift and not following commands, RR 9 on high flow, rapid called, pt placed back on bipap, sitter in room, VBG and labs drawn. Morphine and oxy held due to sedation. RT consulted for Bipap settings. Pt more alert, still not following commands and answering questions, Restless and in pain, doctor came to bedside to assess, prn morphine, lasix, diuril and CXR ordered. Morphine given x2. Switched between Bipap and high flow and maintaining sats. PICC line bleeding has stopped. Small amount of vaginal bleeding. BP soft throughout shift. Biopsy results pending. Continue with POC.      Problem: Adult Inpatient Plan of Care  Goal: Plan of Care Review  Description: The Plan of Care Review/Shift note should be completed every shift.  The Outcome Evaluation is a brief statement about your assessment that the patient is improving, declining, or no change.  This information will be displayed automatically on your shift  note.  Outcome: Progressing  Goal: Absence of Hospital-Acquired Illness or Injury  Outcome: Progressing  Intervention: Identify and Manage Fall Risk  Recent Flowsheet Documentation  Taken 9/9/2024 0330 by Soco Avila, RN  Safety Promotion/Fall Prevention:   safety round/check completed   bedside attendant   clutter free environment maintained   room near nurse's station  Taken 9/8/2024 2330 by Soco Avila, RN  Safety Promotion/Fall Prevention:   safety round/check completed   bedside attendant   clutter free environment maintained   room near nurse's station  Taken 9/8/2024 2000 by Soco Avila, RN  Safety Promotion/Fall Prevention:   safety round/check completed   bedside attendant   clutter free environment maintained   room near nurse's station  Intervention: Prevent Skin Injury  Recent Flowsheet Documentation  Taken " 9/8/2024 2000 by Soco Avila RN  Body Position:   turned   right  Skin Protection:   adhesive use limited   incontinence pads utilized   protective footwear used   skin to device areas padded  Device Skin Pressure Protection:   absorbent pad utilized/changed   adhesive use limited   tubing/devices free from skin contact  Intervention: Prevent and Manage VTE (Venous Thromboembolism) Risk  Recent Flowsheet Documentation  Taken 9/8/2024 2000 by Soco Avila RN  VTE Prevention/Management: compression stockings on  Intervention: Prevent Infection  Recent Flowsheet Documentation  Taken 9/9/2024 0330 by Soco Avila RN  Infection Prevention:   environmental surveillance performed   hand hygiene promoted   personal protective equipment utilized   rest/sleep promoted   single patient room provided   visitors restricted/screened  Taken 9/8/2024 2330 by Soco Avila RN  Infection Prevention:   environmental surveillance performed   hand hygiene promoted   personal protective equipment utilized   rest/sleep promoted   single patient room provided   visitors restricted/screened  Taken 9/8/2024 2000 by Soco Avila RN  Infection Prevention:   environmental surveillance performed   hand hygiene promoted   personal protective equipment utilized   rest/sleep promoted   single patient room provided   visitors restricted/screened

## 2024-09-09 NOTE — PROVIDER NOTIFICATION
09/08/24 2000   Call Information   Date of Call 09/08/24   Time of Call 2004   Name of person requesting the team Christa FELTON   Title of person requesting team RN   RRT Arrival time 2007   Time RRT ended 2024   Reason for call   Type of RRT Adult   Primary reason for call Neurological;Respiratory   Respiratory Respiratory pattern change  (RR 9)   Neurological Lethargic   Was patient transferred from the ED, ICU, or PACU within last 24 hours prior to RRT call? No   SBAR   Situation RRT called for decreased LOC, RR 9 on HFNC.  sats 95 on 50% FIO2   Background per provider note admitted on 9/4/2024. She has hx of chronic resp failure 2/2 COPD/RICKY, morbid obesity, chronic vaginal bleeding who presented to ED with failure to thrive picture and SOB and found to have DVT/PE, cervical mass and severe anemia.   Notable History/Conditions Cardiac;Cancer;COPD   Assessment Arouses to verbal stimulation but falls asleep quickly.  Concern for hypercapnia, pt. placed on bipap with VBG scheduled at 21:00.   Interventions O2 per N/C or mask  (Bipap)   Patient Outcome   Patient Outcome Stabilized on unit   RRT Team   Attending/Primary/Covering Physician Gold 1   Date Attending Physician notified 09/08/24   Time Attending Physician notified 2004   Physician(s) Chuck LORA   Lead RN Zev FELTON   RT Carlos   Post RRT Intervention Assessment   Post RRT Assessment Stable/Improved   Date Follow Up Done 09/08/24   Time Follow Up Done 2310   Comments VBG, mental status improved on bipap.

## 2024-09-09 NOTE — CODE/RAPID RESPONSE
Rapid Response Team Note    Assessment   A rapid response was called on Sherwin Benites due to acute hypoxic and hypercapnic respiratory failure and sedation . This presentation is likely due to pain medications in setting of difficult pain control. Does use a BIPAP with sleep at home and was sleeping on HFNC so may have just had some inadequate clearing. Hesitant to narcan as below.     Plan   -  BiPAP  -  Bedside attendant  -  Repeat VBG @ 2100  -  Hold off on giving further opioids unless more awake   -  The Internal Medicine primary team was at bedside  -  Disposition: The patient will remain on the current unit. We will continue to monitor this patient closely.  -  Reassessment and plan follow-up will be performed by the rapid response team    Tyra Vásquez PA-C  Rapid Response Team [facility]  Securely message with [employer]     Medical Decision Making             Sherwin is critically ill with acute hypercapnic respiratory failure. These features are alarming and indicate that the patient is at imminent risk of life-threatening organ failure. Acute deterioration is being managed by the following critical interventions: oxygen by BiPAP    Total Critical Care time spent by me, excluding procedures, was 30 minutes.    Hospital Course   Brief Summary of events leading to rapid response:   Sleepier this evening. Had been more awake and talking this morning. Notes she had been struggling with pain management throughout the day and day team has had a hard time balancing pain management and alertness. Had been on high flow due to work of breathing. VBG obtained showing developing respiratory acidosis.    Patient remains full code and hopeful for biopsy results tomorrow to further delineate goals of care discussions. At this point would like to prioritize continuing to treat her pain though developing some hypercarbia. She does use a BiPAP at home. Discussed consideration of narcan however at this point she is finally  appearing comfortable and weary of causing distress. Will place on bipap with bedside attendant and repeat VBG to ensure CO2 clearing. Discussed with nursing to hold on further opioids until more awake in the meantime.     Physical Exam   Vital Signs: Temp: 97.7  F (36.5  C) Temp src: Axillary BP: 107/54 Pulse: 94   Resp: (!) 9 (Provider notified and at bedside) SpO2: 96 % O2 Device: High Flow Nasal Cannula (HFNC) Oxygen Delivery: 40 LPM  Weight: 260 lbs 5.81 oz    Exam:   General Appearance: Sleepy. Wakes to voice but easily falls back asleep.   Respiratory: Normal work of breathing on HFNC. Lungs overall clear.   Cardiovascular: RRR.

## 2024-09-09 NOTE — PROVIDER NOTIFICATION
"NIKHIL Mayen notified via [employer] text paging     0844: \"Can you please come assess patient. She is very uncomfortable (pain), now refusing the bipap, back on High flow. Can we try something small for pain?\"    Provider to bedside to assess patient. New orders placed.     Orders placed for diuril, lasix, CXR and morphine.   "

## 2024-09-09 NOTE — PROVIDER NOTIFICATION
"NIKHIL Mayen notified via [employer] text paging    \"Midnight VBG resluted, Co2 54. When should we check next VBG? Also RR 9 on the Bipap.\"    0055: No response, follow up text sent:  \"Discussed with RT, they are recommending changes to Bipap settings but they need orders.\"    0058: Tiarra requests RT to call to discuss.   "

## 2024-09-09 NOTE — PROGRESS NOTES
[facility]    Medicine Progress Note - Hospitalist Service, GOLD TEAM 1    Date of Admission:  9/4/2024    Assessment & Plan   Sherwin Benites is a 68 year old female admitted on 9/4/2024. She has hx of chronic resp failure 2/2 COPD/RICKY, morbid obesity, chronic vaginal bleeding who presented to ED with failure to thrive picture and SOB and found to have DVT/PE, cervical mass and severe anemia.     Interim history  Patient with RRT called 9/8 in PM d/t acute hypoxic and hypercapnic respiratory failure and sedation.  Patient was lethargic, with RR of 9 on HFNC.  Improved with use of BiPAP and bedside attendant, further opiods were held.  Most recent VBG with slightly improved pH (trending up at 7.31) and pCO2 (48) on BiPAP.  Patient more alert but contined not to follow commands, restless and in pain.  CXR performed with evidence of aterlectasis/edema.  Given morphine x3, lasix, diuril.  Recent episode of hypoglycemia since this AM, with readings of 68, 112, 76, 94.   Anti Xa Unfractionated Heparin level in range.  Tolerating BiPAP this morning, arouses to stimuli but looks tired and not following commands.  Moaning in pain intermittently, resolved with administration of pain medication.  Patient's wife present and expresses concern regarding pain management and next steps in care.  Agrees with consulting palliative care for pain management.    Today  Gyn/onc will follow-up with patient and her wife regarding new pathology results  Continue BiPAP as tolerated  2.   Continue cefepime  Oxycodone 5-10 mg every 4 hours prn   IV morphine 1-2 mg every 2 hours prn  Hemoglobin every 6 hours   Consult palliative   PT/OT, SW/CM consult      # Bilateral segmental and subsegmental PE sp IVC filter 9/6  # RLE DVT  # Acute on chronic respiratory failure   # Hx of RICKY on home nocturnal BiPaP  # Chronic oxygen dependence (4LPM baseline)  #  Leukocytosis   # HFpEF  # lower extremity edema  -  Pt presented with SOB, found to be hypoxic by paramedics, CT PE revealed b/l PE, no CT e/o right heart strain, however, does have elevated BNP. ED contacted Hematology, ok to continue heparin gtt without bolus, transfuse and monitor   - 9/5/2024 patient's respiratory status worsened requiring BiPAP. Rapid called. CXR with mild increase in suprahilar streaky opacities and bronchial wall thickening. White count is up to 20.4. VBG 7.29/48. Troponin is 36. Stat echo with flattened septum, right ventricular pressure, moderate right ventricular dilation, mild reduction in ventricular function. Per cards fellow IVF fairly flat. Lactic acid wnl.   - etiology may be multifactorial. PE may be contributing but overall clot burden not that impressive so unclear if worsening respiratory status is attributed solely to PE. No wheezing or concern for COPD exacerbation. Low concern for infection at this time with suspicion that leukocytosis is likely reacted. TACO is a possibility given recent transfusions. However per CARDS fellow IVC is flat. Need to be careful with diuresis as she is preload dependent with PE.   - Of note, PERT team was activated in the ED. No indication for TPA or embolectomy at this time.   - 9/6. Heparin drip on hold due to severe bleeding.   - 9/6 IVC filter replaced by IR  - 9/7: heparin restarting now that bleeding controlled   - 9/8: MRSA swab negative  - 9/9: RRT per decreased LOC, RR 9 on HFNC. sats 95 on 50% FIO2.  Suspected d/t pain medications in setting of difficult pain control.  Recommended continuous BiPAP with bedside attendant.  VBG with slightly improved pH (trending up at 7.31) and pCO2 (48) on BiPAP.  CXR 9/9 indicated decreased perihilar and bibasilar streaky opacities, started on 1 dose each of IV furosemide and diuril.   - of note, patient was discharged on oxygen after pneumonia in 2013. And still may have oxygen at home though unclear how much she uses, or what the indication for  supplemental oxygen would be. She carries a diagnosis of COPD but is a non-smoker.   - Hematology is following peripherally   Plan  --Continue heparin drip at this time   -When she is more stable from a hemodynamic standpoint and when there is a plan in place from the GYN oncology perspective then can transition to oral anticoagulant (apixaban 5mg twice daily ideally) to be continued indefinitely  --Will need removal of IVC filter with IR once she can tolerate anticoagulation stabilization and no procedures are planned  - continue BiPAP continuously, will reassess oxygen weaning tomorrow. Consult with RT pending patient status. Of note, patient with PTA nocturnal BiPAP PTA and 4 LPM via NC per wife.  - Defer scheduled diuresis   - Continue cefepime (D1)  - lymphedema consult     # Cervical mass concerning for malignancy sp biopsy with gyn/onc 9/6  # Chronic vaginal bleeding sp uterine artery embolization with IR on 9/6  # Acute on chronic blood loss anemia 2/2 vaginal bleeding   # suspicious left supraclavicular, lower right para-aortic and left retrocaval regoin adenopathy that is concerning for malignancy   - Pt also c/o abdominal pain and given severity of anemia CT A/P was obtained which revealed large cervical mass. GynOnc recommended medicine admission given medical complexity  - presented with hemoglobin slightly trending down of 7.5  - Gyn-onc following: speculum exam with biopsy 9/6.  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 Fragments of endocervical mucosa invaded by the tumor are identified.  Plan to follow-up with patient and family today.  Plan  - follow up biopsy results with OB/GYN regarding surgical options given new pathology results who will discuss with patient and family today  - monitor hemoglobin every 6 hours  - oxycodone 5-10 mg every 4 hours prn   - IV morphine 1-2 every mg every 2 hours prn  - palliative care consult next week      #  Generalized weakness  # Failure to thrive   # Weight loss   # Chronic abdominal pain, post prandial   - patient reports > 1 year of post prandial abdominal pain. Have not been able to further assess yet due to acute issues, but ultimately if she is not able to tolerate PO this will need to be investigated and we will need to assess nutrition.  - possibly could be related to cancer vs other (I.E. peptic ulcer)  Plan  - continue to monitor   - nutrition consult   - D5 fluids while either not eating or while NPO     # Failure to thrive   - Of note, patient lives with significant other Ludmila. Per their report, patient was growing progressively weaker leading up to this admission. She was no longer able to navigate stairs at their place. She was becoming essentially bed bound.  Plan  - PT, OT, SW/CM consults     # Intermittent tachycardia   - 9/7 overnight tachycardic up to 130's. EKG showed bifascicular block. Troponin stable.   - continues with intermittent tachycardia  Plan  - continue tele monitoring for now     # MEENAKSHI vs CKD  - Pt's Cr is 1.19, most recent is 0.75 in 2021.   - improved somewhat with fluids  Plan  - continue to monitor      # Hx of RICKY  -Hx of BiPAP at night, currently on continuous BiPAP in the setting of acute hypoxic and hypercapnic respiratory failure and sedation.    Plan  -Will continue continuous BiPAP at this time.     # Hypoglycemia  - Recent episode of hypoglycemia 9/9 in AM with reading of 68.  Follow-up readings of 112, 76, 94.  - Unable to safety consume PO currently given need for continuous BiPAP    Plan  - 50 mL Injection of D5 (x1).  Will continue with D10 IV of 50cc per hour  - Continue POCT glucose monitoring q 6 hours.  May adjust pending her trends.        Diet: Snacks/Supplements Adult: [employer] Oral Supplement; With Meals  Regular Diet Adult    DVT Prophylaxis: heparin drip   Miller Catheter: PRESENT, indication: Acute retention or obstruction  Lines: PRESENT    {  "TIP  Link to Avatar to review     :41705}  PICC 09/08/24 Double Lumen Right Brachial vein lateral-Site Assessment: WDL except;Bleeding    Cardiac Monitoring: ACTIVE order. Indication: QTc prolonging medication (48 hours)  Code Status: Full Code      Clinically Significant Risk Factors   { TIP  Diagnoses will continue to appear throughout the admission.  :[id number]}           # Hypoalbuminemia: Lowest albumin = 2.3 g/dL at 9/8/2024  7:49 PM, will monitor as appropriate                 # Severe Obesity: Estimated body mass index is 42.02 kg/m  as calculated from the following:    Height as of this encounter: 1.676 m (5' 6\").    Weight as of this encounter: 118.1 kg (260 lb 5.8 oz).   # Severe Malnutrition: based on nutrition assessment                 Disposition Plan   {TIP  The patient's Medical Readiness for Discharge [MRD] has been documented today or is 'Ready Now'. Last Documentation- Anticipated in 5+ Days   Use SmartList below if a change is needed.  :26799}  Medically Ready for Discharge: Anticipated in 5+ Days           The patient's care was discussed with the medicine attending    LOGAN Clayton    Hospitalist Service, GOLD TEAM [address] [place] [address]  Securely message with Be[employer] (more info)  Text page via [employer] Paging/Directory   See signed in provider for up to date coverage information  ______________________________________________________________________    Interval History   See above    Physical Exam   Vital Signs: Temp: (!) 96.6  F (35.9  C) Temp src: Axillary BP: 117/56 Pulse: 79   Resp: 27 SpO2: 98 % O2 Device: BiPAP/CPAP Oxygen Delivery: 40 LPM  Weight: 260 lbs 5.81 oz    General Appearance: Alert  HEENT: Anicteric sclera, MMM   Respiratory: Lungs CTAB, No wheezing , no crackles,   Cardiovascular: RRR , no murmur   GI: diffuse abdominal tenderness   Skin: No rash or jaundice on exposed skin   Musculoskeletal: B/l lower extremity edema with edema " wraps  Neurologic: Arousable to stimuli, not following commands, CN II-XII grossly intact, Moves extremities equally       Medical Decision Making   { TIP   MDM Calculator    MDM grid (w/ times)    Coding Support Chat  Billing is now based on time OR medical decision making complexity. Medical decision making included in your A&P does NOT need to be re-documented here.    :[id number]}    60 MINUTES SPENT BY ME on the date of service doing chart review, history, exam, documentation & further activities per the note.      Data   ------------------------- PAST 24 HR DATA REVIEWED -----------------------------------------------

## 2024-09-09 NOTE — PLAN OF CARE
Tmax: 97.7 F. Pt on tele.   Circulatory: 1 unit RBCs received today.  Respiratory: Sating >92 on high flow nasal cannula.  GI/: Adequate urine output. No BM since 9/3.  Diet/appetite: Not eating today. Ginger ale and Sprite for intake, provider made aware and added orders for D5 1/2 NS and Q6H BG checks.   Activity:  Bedrest. Use lift for moving.  Pain: Managed with oxycodone and morphine today, given PRN.  Skin: PICC line inserted today. Some bleeding from insertion site. Vascular consulted about the bleeding. Pressure added and they will change the dressing tomorrow.   LDA's: Antecubital PIV pulled. Lower right PIV infusing. PICC able to access for labs and for fluids, per vascular orders.    Plan: Biopsy results pending. Continue with POC. Notify primary team with changes.      Problem: Adult Inpatient Plan of Care  Goal: Optimal Comfort and Wellbeing  Outcome: Progressing  Intervention: Monitor Pain and Promote Comfort  Flowsheets (Taken 9/8/2024 1927)  Pain Management Interventions: medication (see MAR)  Intervention: Provide Person-Centered Care  Flowsheets (Taken 9/8/2024 1927)  Trust Relationship/Rapport:   care explained   emotional support provided   empathic listening provided   questions answered   questions encouraged     Problem: Pain Acute  Goal: Optimal Pain Control and Function  Outcome: Progressing  Intervention: Develop Pain Management Plan  Flowsheets (Taken 9/8/2024 1927)  Pain Management Interventions: medication (see MAR)  Intervention: Prevent or Manage Pain  Flowsheets  Taken 9/8/2024 1927  Medication Review/Management:   medications reviewed   high-risk medications identified  Taken 9/8/2024 1224  Medication Review/Management:   medications reviewed   high-risk medications identified  Intervention: Optimize Psychosocial Wellbeing  Flowsheets (Taken 9/8/2024 1927)  Supportive Measures:   active listening utilized   self-care encouraged   self-responsibility promoted   Goal Outcome  Evaluation:

## 2024-09-09 NOTE — PROGRESS NOTES
9/9/2024  7:39 AM    HOSPITAL MEDICINE NOTE:  CXR: RESIDENT PRELIMINARY INTERPRETATION  Impression: Decreased perihilar and bibasilar streaky opacities,  likely atelectasis/edema.  Patient received the furosemide and IV chlorothiazide (Diuril ) just after the CXR was done.  Plan:  continue close observation and monitoring.    Steve Maeyn MD  [phone] pager  [phone] mobile/cell

## 2024-09-10 NOTE — CONSULTS
[facility]  Palliative Care Consultation Note    Patient: Sherwin Benites  Date of Admission:  9/4/2024    Requesting Clinician / Team   Reason for consult: Pain management  Goals of care    Assessment and Recommendations:  Sherwin Benites is a 68 year old female with a history of COPD, RICKY, obesity complicated by hypoventilation syndrome on chronic O2 3 L who presented to the emergency room on 9/4/2020 for with a chief complaint of shortness of breath, chest pain,, poor appetite, generalized weakness, and abdominal discomfort as well as chronic complaints of intermittent vaginal bleeding sometimes extensive.  Workup at the emergency room revealed bilateral pulmonary embolisms, severe anemia, and a cervical mass presumed to be responsible for the chronic vaginal bleeding.  She and underwent uterine artery embolization and IVC filter placement on 9/6/2024.  Patient has been on cefepime for possible respiratory infection as well.  On 9/8/2024 a rapid response was called for acute hypoxic and hypercapnic respiratory failure thought to be secondary to opioid treatment for pain, and patient was placed on BiPAP and has remained on that until today.  Patient has been given IV morphine for pain, and has been essentially sedated/minimally responsive since the evening of 9/8/2024.  Reports state that anytime patient is moved she cries out in discomfort.    Today patient is somnolent, but responds to questions with a moan.  She also moans anytime she is touched anywhere on her body.  She does not follow any kind of commands.  She is otherwise comfortable appearing with on BiPAP..        Symptoms:   Pain: Patient was having complaints of discomfort prior to the BiPAP placement, likely multifactorial secondary to procedural interventions, pulmonary embolisms, and cervical mass.  Rapid response with BiPAP placement was thought to be related to overtreatment with opioids.  She continues to get IV morphine,  currently though staff are concerned about discomfort continuing.  Given patient's altered level of consciousness, it is difficult to differentiate pain versus hypoactive delirium.               *Would recommend stopping the morphine, and switching to low-dose Dilaudid, particularly given patient's worsening renal function for clear pain symptoms.                 *Consider low-dose Haldol if delirium/agitation suspected.  Strongly encourage trying to differentiate pain from agitation as treating agitation with opioids is likely to exacerbate her delirium               *Consider discontinuing or changing cefepime, for concerns of cefepime-induced encephalopathy.    AMS: As Above.    Agree with primary team plan to workup for other sources of encephalopathy.  Avoid treating agitation with opioids as this will exacerbate her delirium        Prognosis, Goals, & Planning:   Discussion about disease understanding, prognosis, care goals: Unable to address at this time; will plan to meet with patient's significant other  Decision making capacity: None currently  Code Status:  Full Cod        These recommendations have been discussed with Dr hebert.        Thank you for the opportunity to participate in the care of this patient and family. Our team will follow. Please feel free to contact the on-call Palliative provider with any urgent needs.    Patient seen and evaluated with Dr. Hill   Agree with assessment and recommendations.  After our initial visit with patient I saw patient again in afternoon when her wife Ludmila was present. Reviewed with Ludmila medical updates and concerns that patient's condition is declining and we are still investigating causes for her worsening AMS and respiratory status decline. Ludmila expressed concern for not wanting to prolong suffering if she is not going to recover and agreed with a DNR order. We also discussed DNI and Ludmila was leaning towards DNI but wanted to consider option  if there was a reversible cause (see separate progress note for details of the discussion).   Also of note Ludmila shared that patient has always responded strongly to minor discomfort -for example she would commonly call out when she would have her blood pressure checked and at home she would frequently moan but say she was not in pain. Ludmila felt this made patient's current presentation --calling out with turns--somewhat difficult to assess whether she was having severe pain vs agitation and anxiety. I discussed with her that we have changed her to a different pain medication in case this is causing her AMS and also we have added another medication (haldol) for agitation.     Tamara Cee MD / Palliative Medicine   Securely message with the Vocera Web Console (learn more here)   Text page via [employer] Paging/Directory         History of Present Illness:   Sources of History:electronic health record        ROS:  Unable to perform, as patient is nonverbal     Past Medical History:  Past Medical History:   Diagnosis Date    Arthritis ~2002, ~2015    thumbs, feet, knees, hips    COPD (chronic obstructive pulmonary disease) (H) Feb 2013    HN flu    Depressive disorder younger person    stay ok w/vit-min & good nutrition    Heart disease ~2004    1 heart attack then, another later.    Morbid obesity (H)     Pneumonia     Hx of multiple pneumonia    Premature infant     born 3 months early    Uncomplicated asthma baby    winter bronchitis in adulthood        Past Surgical History:  Past Surgical History:   Procedure Laterality Date    BREAST BIOPSY, RT/LT  2005    Right    BREAST SURGERY  ~1995?    biopsy only -neg    CHOLECYSTECTOMY      IR IVC FILTER PLACEMENT  9/6/2024    IR UTERINE ARTERY NON FIBROID EMBO  9/6/2024    PICC DOUBLE LUMEN PLACEMENT Right 09/08/2024    48 cm DL picc placed lateral brachiol with 4 cm external         Family History:  Family History   Problem Relation Age of Onset    C.A.D. Father      Cerebrovascular Disease Father         Due to surgery above.    Genitourinary Problems Father         CKD    Coronary Artery Disease Father         Carotid cleaned out    Substance Abuse Father         alcoholic    Alzheimer Disease Mother     Respiratory Mother         copd    Substance Abuse Mother         alcoholic    Unknown/Adopted Mother         age 79 bp Rx reaction    Unknown/Adopted Maternal Grandmother         age 34 childbirth    Cerebrovascular Disease Maternal Grandfather         age 82    Coronary Artery Disease Paternal Grandfather         age 55    Coronary Artery Disease Other         age 66 endocarditis    Coronary Artery Disease Other         age 71 myocarditis w/decompensation    Coronary Artery Disease Other         age 76 arteriosclerotic heart disease    Coronary Artery Disease Other         age 73 coronary sclerosis    Coronary Artery Disease Other         age 84 myocardian infarction    Coronary Artery Disease Other         age 61    Coronary Artery Disease Other         age 83    Hypertension Other         age 77    Cerebrovascular Disease Other         age 75    Cerebrovascular Disease Other         age 89    Cerebrovascular Disease Other         age 83 apoplexy    Other Cancer Other         89 unk cancer    Substance Abuse Other         alcoholic    Substance Abuse Other         alcoholic    Substance Abuse Other     Unknown/Adopted Other         age 22 childbirth    Unknown/Adopted Other         age 89 pneumonia    Unknown/Adopted Other         age 81 pneumonia    Unknown/Adopted Other         age 74 acute nephritis         Allergies:  Allergies   Allergen Reactions    Echinacea Difficulty breathing    Latex     Penicillins      Can't tolerate the smell of the tablet        Medications:  I have reviewed this patient's medication profile and medications from this hospitalization.   Noted scheduled meds are:  Lasix, cefepime,    Noted PRN meds are:  Morphine 1-2 mg Q1HR PRN        Physical  Exam:  Vital Signs: Temp: 97.9  F (36.6  C) Temp src: Axillary BP: 114/52 Pulse: 72   Resp: 18 SpO2: 97 % O2 Device: BiPAP/CPAP    Weight: 260 lbs 5.81 oz     Gen: sitting/lying in bed.  On BiPAP, in no acute distress  HEENT: Pale conjunctiva.  Moist mucous membranes  CV: RRR , Peripheral perfusion intact.   Resp: On BiPAP, in no acute distress.  Normal breath sounds  Msk: no gross deformity sarcopenia  Skin: Pale  Ext:  Pale with edema present  Neuro: Minimally responsive.  Verbalizes with the moan to questions.  Does not follow commands.  Pupils 2 mm and reactive.      Data reviewed:    Recent lab data reviewed.         Medical Decision Making       MANAGEMENT DISCUSSED with the following over the past 24 hours: medicine, RN   NOTE(S)/MEDICAL RECORDS REVIEWED over the past 24 hours: medicine, oncology, hematology, review of MAR  Tests REVIEWED in the past 24 hours:  - BMP  - CBC  SUPPLEMENTAL HISTORY, in addition to the patient's history, over the past 24 hours obtained from:   - wife  Medical complexity over the past 24 hours:  - Decision to DE-ESCALATE CARE based on prognosis

## 2024-09-10 NOTE — PROGRESS NOTES
Brief palliative note:     -- see consult note from same day for full consult--    This afternoon I met with wife Ludmila today and reviewed medical course and concerns and code status. Ludmila understands that Sherwin has an advanced cancer and blood clots to the lungs on top of her baseline COPD and that her respiratory status and mental status have been worsening and that this may not be a reversible/fixable process. She wants Sherwin to recover if she can but does not want her to suffer unnecessarily if she is not going to get better.     In response to our discussion Ludmila changed patient's code status to DNR and is considering DNI but still thinking about it---she IS STRONGLY CONSIDERING DNI AND WOULD ONLY WANT INTUBATION IF THERE IS CLEARLY A REVERSIBLE CONDITION CAUSING ACUTE RESPIRATORY FAILURE AND IF THERE IS REASONABLE EXPECTATION SHE MAY BE ABLE TO WEAN OFF VENT. PLEASE CALL LUDMILA TO DISCUSS IF INTUBATION IS BEING CONSIDERED.       See consult note from today for full palliative consult.        Tamara Cee MD / Palliative Medicine   Securely message with the Vocera Web Console (learn more here)   Text page via [employer] Paging/Directory

## 2024-09-10 NOTE — PROGRESS NOTES
[facility]    Medicine Progress Note - Hospitalist Service, GOLD TEAM 1    Date of Admission:  9/4/2024    Assessment & Plan   Sherwin Benites is a 68 year old female with past medical history significant for chronic respiratory failure 2/2 COPD, RICKY, morbid obesity, chronic vaginal bleeding who presented to the ED with failure to thrive and dyspnea and found to have acute PE, new cervical mass, and severe anemia.      # Bilateral segmental and subsegmental acute PE s/p IVC filter on 9/6   # RLE DVT   # Acute on chronic hypoxic respiratory failure   # Hx RICKY on BIPAP PTA   BIB EMS with dyspnea and hypoxia.  Per d/w wife Ludmila, typically uses 3L NC over the last year.  Recently has been needing 4 to 4.5L NC.  CT PE on admission with bilateral PE, no e/o R heart strain on CT.  BNP elevated to 23,140.  Started on heparin drip with close monitoring of Hgb as below.  Yesterday 9/8 was able to maintain sats with high flow during the day and BIPAP at night. Received gentle diuresis as well given stable BPs.  Has required BIPAP throughout the day yesterday, plan for RT to wean to HFNC.  Most recent VBG on BiPAP with pH trending down to 7.22, pC02 increased to 56.   Anti Xa Unfractionated Heparin continues to be in normal range.  - VBG on HFNC once she is weaned  - Will trial weaning to high flow with RT   - Spot dose diuretics   - Check NTproBNP in AM        - Continuous pulse ox   - Continue heparin gtt for now, monitor for s/s active bleed  - Daily CBC to monitor Hgb     # Cervical mass concerning for malignancy s/p biopsy (9/6)   # Chronic vaginal bleeding s/p uterine artery embolization (9/6)   # Acute on chronic blood loss anemia 2/2 vaginal bleeding   # Suspicious left supraclavicular, lower right paraaortic, and left retrocaval region adenopathy c/f malignancy   On admission pt c/o abdominal pain.  CBC on admission significant for Hgb 4.9 requiring PRBCs x 3 units.  Given  severity of anemia, CT A/P was obtained which revealed large cervical mass. Gyn Onc consulted.  Notes that she has had consistent non-cyclical bleeding through 50s and 60s.  Had speculum exam and cervical bx on 9/6, and underwent UAE and IVC filter placement on 9/6 w/ IR.  Pathology returned 9/9 with adenocarcinoma of endometrioid cell type, FIGO grade 2, and fragments of endocervical mucosa invaded by the tumor.  Significant pain with repositioning.  Most recent HgB trending down to 7.3.    - Gyn Onc consulted, appreciate recs; at this point, pt is not well enough to tolerate chemo. Would reconsider when medically improved.  This was discussed with pt's spouse Ludmila but not with the patient; will revisit again today if pt able to come off BIPAP.  - Palliative Care consulted for symptom management (discussed this on 9/9 w/ Ludmila and she is agreeable)   - Pain control for now:   - Oxycodone 5-10 mg every 4 hours prn   - IV morphine 1-2 every mg every 2 hours prn  - Hematology consulted, appreciate recs; eventually can transition to apixaban 5mg BID for PE/DVT treatment pending goals of care and clinical course    # Generalized weakness   # Failure to thrive   # Weight loss   # Chronic post prandial abdominal pain   # Hypoglycemia   Likely 2/2 cancer and anemia.  Has been NPO throughout the day d/t BIPAP requirements. BG dropped to 68 on 9/9 with overall downtrend despite treatment with D50.  Switched fluids to D10 at 50cc/hr.    Most recent glucose spot checks relatively stable, noted to range from 75-85 since midday yesterday.  - Glucose POCT ACHS  - Nutrition consult   - Lymphedema consult     # Intermittent tachycardia   Improving.  9/7 overnight tachycardic up to 130's. EKG showed bifascicular block. Troponin stable.   - Continues with intermittent tachycardia  - Will monitor vital continuously    # MEENAKSHI vs CKD   Cr is 1.53, most recent is 0.75 in 2021.   - Currently trending up at 1.53  - Pending [facility]  "conversations, would avoid nephrotoxic medications as able.  Will continue to monitor BMP daily    # Concern for delirium vs encephalopathy  Given onset of AMS since hospitalization, question possible delirium vs encephalopathy.  Patient's partner endorse decline in respiratory status PTA, but denies mental status changes in past months.  Will complete further work-up for possible etiology.  - CT head w/o contrast  - CT chest w/o contrast  - Bladder scan   - COVID test  - Respiratory panel          Diet: Snacks/Supplements Adult: Nargis Wright Standard Oral Supplement; With Meals  Regular Diet Adult    DVT Prophylaxis: Heparin gtt   Miller Catheter: PRESENT, indication: Acute retention or obstruction  Lines: PRESENT    { TIP  Link to [employer] to review     :[id number]}  PICC 09/08/24 Double Lumen Right Brachial vein lateral-Site Assessment: WDL      Cardiac Monitoring: ACTIVE order. Indication: Acute hypoxic respiratory failure 2/2 bilateral PE  Code Status: Full Code      Clinically Significant Risk Factors   { TIP  Diagnoses will continue to appear throughout the admission.  :[id number]}           # Hypoalbuminemia: Lowest albumin = 2.3 g/dL at 9/8/2024  7:49 PM, will monitor as appropriate    # Acute Kidney Injury, unspecified: based on a >150% or 0.3 mg/dL increase in last creatinine compared to past 90 day average, will monitor renal function            # Severe Obesity: Estimated body mass index is 42.02 kg/m  as calculated from the following:    Height as of this encounter: 1.676 m (5' 6\").    Weight as of this encounter: 118.1 kg (260 lb 5.8 oz).   # Severe Malnutrition: based on nutrition assessment                 Disposition Plan   {TIP  The patient's Medical Readiness for Discharge [MRD] has been documented today or is 'Ready Now'. Last Documentation- Anticipated in 5+ Days   Use SmartList below if a change is needed.  :[id number]}  Medically Ready for Discharge: Anticipated in 5+ Days           The patient's care was " discussed with the Attending Physician, Dr. Leos ***    Dulce Maria Hartmann  Hospitalist Service, GOLD TEAM 1  [facility]  Securely message with [employer] (more info)  Text page via [email or web address] Paging/Directory   See signed in provider for up to date coverage information  ______________________________________________________________________    Interval History   Sherwin is resting comfortably in bed.  Unable to obtain ROS due to respiratory status.   ***    Physical Exam   Vital Signs: Temp: 97.9  F (36.6  C) Temp src: Axillary BP: 114/52 Pulse: 72   Resp: 18 SpO2: 97 % O2 Device: BiPAP/CPAP    Weight: 260 lbs 5.81 oz    GENERAL: Well nourished, well developed.  Intermittently moaning in pain.   HEENT: Normocephalic, atraumatic. Anicteric sclera. Mucous membranes moist.   CV: RRR. S1, S2. Trace murmur.   RESPIRATORY: Effort normal on BIPAP. Lungs CTAB with no wheezing, rales, or rhonchi.   GI: Abdomen soft and non distended, bowel sounds present x all 4 quadrants. No tenderness, rebound, or guarding.   NEUROLOGICAL: No focal deficits.   MUSCULOSKELETAL: No joint swelling or tenderness. Moves all extremities.   EXTREMITIES: No gross deformities. Legs in bilateral edema wraps.   SKIN: Grossly warm, dry, and intact. No jaundice. No rashes. Significant pallor noted.      Medical Decision Making   { TIP   MDM Calculator    MDM grid (w/ times)    Coding Support Chat  Billing is now based on time OR medical decision making complexity. Medical decision making included in your A&P does NOT need to be re-documented here.    :[id number]}    65 MINUTES SPENT BY ME on the date of service doing chart review, history, exam, documentation & further activities per the note.      ***  Data     I have personally reviewed the following data over the past 24 hrs:    23.4 (H)  \   7.3 (L)   / 290     138 110 (H) 36.2 (H) /  85   3.7 22 1.53 (H) \       Imaging results reviewed over the past 24 hrs:   No  results found for this or any previous visit (from the past 24 hour(s)).

## 2024-09-10 NOTE — PROGRESS NOTES
"Gynecology Oncology Progress Note  September 10, 2024    Ms. Sherwin Benites is a 68 year old HD#7 with G2 endometrioid adenocarcinoma confirmed on biopsy from 9/6, POD#4 L UAE, IVF filter placement.    She is overall worsening with increasing oxygen requirements to 30-40L HFNC and BiPAP. She has significant pain and is non-verbal, frequently somnolent. No reports of ongoing vaginal bleeding.    Objective:   /67 (BP Location: Left arm)   Pulse 84   Temp 97.6  F (36.4  C) (Axillary)   Resp 18   Ht 1.676 m (5' 6\")   Wt 118.1 kg (260 lb 5.8 oz)   SpO2 93%   BMI 42.02 kg/m      General: moaning in pain, non-responsive to team   Resp: % on 30-40L NC, BiPAP    Labs reviewed  VBG: pH 7.22, pCO2 56, BE -4.6.  BMP: Cr 1.53   CBC: Hgb 7.3, WBC 23.4    We discussed Sherwin's case with her wife Ludmila. Ludmila had made the decision to make Sherwin DNR, and was wondering what DNI would mean, as she would not want to put Sherwin through intubation unless there was a discrete pathology that could be treated and improved. We discussed that Sherwin's respiratory status was not improving despite treatment for her PE, and that overall, we did not feel intubation would be something that would be temporary. Ludmila understood and decided to make Sherwin DNR/DNI. We discussed what different types of care could look like moving forward, including comfort measures only, and that they would likely take place in the hospital rather than at home. Ludmila understanding; her rabbi will be here tomorrow at 2 pm for spiritual support.     This patient was seen and discussed with Dr. Torres.    Carol Rey MD, MSc  Gynecologic Oncology, PGY-1  09/10/2024 5:51 PM    This patient is on the GYNECOLOGY ONCOLOGY Team.    To reach the RESIDENT PHYSICIAN team for this patient, please page [phone]    I have seen the patient and discussed prognosis and goals of care with her wife Ludmila.  I have reviewed and edited Dr. Rey's note " above.  Discussed that if Sherwin's respiratory status worsens to the point of needing intubation, she will not recover. Also discussed recommendation for comfort care given Sherwin's declining status.   -Ludmila confirms DNR/DNI status.   -Will continue discussion of comfort care, likely in the hospital.     Sylvia Torres MD, MS, FACOG, FACS  9/11/2024  9:00 AM

## 2024-09-10 NOTE — PROGRESS NOTES
[facility]    Medicine Progress Note - Hospitalist Service, GOLD TEAM 1    Date of Admission:  9/4/2024    Assessment & Plan   Sherwin Benites is a 68 year old female with past medical history significant for chronic respiratory failure 2/2 COPD, RICKY, morbid obesity, chronic vaginal bleeding who presented to the ED with failure to thrive and dyspnea and found to have acute PE, new cervical mass, and severe anemia.      # Bilateral segmental and subsegmental acute PE s/p IVC filter on 9/6   # RLE DVT   # Acute on chronic hypoxic respiratory failure   # Hx RICKY on BIPAP PTA   BIB EMS with dyspnea and hypoxia.  Per d/w wife Ludmila, typically uses 3L NC over the last year.  Recently has been needing 4 to 4.5L NC.  CT PE on admission with bilateral PE, no e/o R heart strain on CT.  BNP elevated to 23,140 on admission and continues to rise, 33,043 today.  Started on heparin drip with close monitoring of Hgb as below.  VBGs today after ~ 24 hr on BIPAP still with slightly CO2 retention (likely chronic to some degree).  Repeat COVID today negative.  CT chest w/o contrast with new opacities bilaterally with other increase in bronchial wall thickening in the anterior paramediastinal regions bilaterally, concerning for worsening inflammation vs infection along with new pleural effusions and probably associated bibasilar edema in addition to atelectasis, pulmonary artery enlargement which may indicate pulmonary HTN, borderline pericardial effusion, and unchanged likely reactive mediastinal adenopathy.    - Trial weaning to high flow with RT today 9/10  - Continue to spot dose diuretics, caution with renal function        - Continuous pulse ox   - Continue heparin gtt for now, monitor for s/s active bleed  - Daily CBC to monitor Hgb     # Cervical mass concerning for malignancy s/p biopsy (9/6)   # Chronic vaginal bleeding s/p uterine artery embolization (9/6)   # Acute on chronic  blood loss anemia 2/2 vaginal bleeding   # Suspicious left supraclavicular, lower right paraaortic, and left retrocaval region adenopathy c/f malignancy   On admission pt c/o abdominal pain.  CBC on admission significant for Hgb 4.9 requiring PRBCs x 3 units.  Given severity of anemia, CT A/P was obtained which revealed large cervical mass. Gyn Onc consulted.  Notes that she has had consistent non-cyclical bleeding through 50s and 60s.  Had speculum exam and cervical bx on 9/6, and underwent UAE and IVC filter placement on 9/6 w/ IR.  Pathology returned today with adenocarcinoma of endometrioid cell type, FIGO grade 2, and fragments of endocervical mucosa invaded by the tumor.  Significant pain with repositioning.    - Gyn Onc consulted, appreciate recs; at this point, pt is not well enough to tolerate chemo. Would reconsider when medically improved.  This was discussed with pt's spouse Ludmila but not with the patient; will revisit when/if pt able to come off BIPAP.  - Palliative Care consulted for symptom management, appreciate recs    - Met with pt's spouse Ludmila today, changed code status to DNR; should pt decompensate and need intubation, Ludmila should be contacted  - Hematology consulted, appreciate recs; eventually can transition to apixaban 5mg BID for PE/DVT treatment pending goals of care and clinical course    # Progressive encephalopathy vs delirium   Unclear if 2/2 underlying pain, infection, hypoxia, vs other.  Has been on Cefepime IV x 4 days which can certainly contribute to mentation changes, especially in setting of MEENAKSHI.  Has received PRN doses of morphine, which may be slow to clear in setting of MEENAKSHI as well.  CT chest findings as above with concern for possible worsening infection vs fluid.  - Stop Cefepime, switch to Zosyn IV   - Stop morphine, switch to IV Dilaudid   - CT head negative   - Monitor VBGs in AM           # Generalized weakness   # Failure to thrive   # Weight loss   # Chronic  "post prandial abdominal pain   # Hypoglycemia   Likely 2/2 cancer and anemia.  Has been NPO throughout the day d/t BIPAP requirements. BG dropped to 68 with overall downtrend despite treatment with D50.  Switched fluids to D10 at 50cc/hr.      - Nutrition consult   - Lymphedema consult     # Intermittent tachycardia   Improving.  9/7 overnight tachycardic up to 130's. EKG showed bifascicular block. Troponin stable.   - Continues with intermittent tachycardia  - Monitor     # MEENAKSHI vs CKD   Cr is 1.53, most recent is 0.75 in 2021.  Briefly improved, but continuing to rise again.  Likely multifactorial d/t anemia, unable to tolerate PO intake, hypotension.   - Monitor BMP daily   - Avoid nephrotoxic agents   - Monitor I/Os  - Check UA/UC as above            Diet: Snacks/Supplements Adult: Nargis Barkibu Standard Oral Supplement; With Meals  Regular Diet Adult    DVT Prophylaxis: Heparin gtt   Miller Catheter: PRESENT, indication: Acute retention or obstruction  Lines: PRESENT      PICC 09/08/24 Double Lumen Right Brachial vein lateral-Site Assessment: WDL      Cardiac Monitoring: ACTIVE order. Indication: Acute hypoxic respiratory failure 2/2 bilateral PE  Code Status: Full Code      Clinically Significant Risk Factors              # Hypoalbuminemia: Lowest albumin = 2.3 g/dL at 9/8/2024  7:49 PM, will monitor as appropriate    # Acute Kidney Injury, unspecified: based on a >150% or 0.3 mg/dL increase in last creatinine compared to past 90 day average, will monitor renal function            # Severe Obesity: Estimated body mass index is 42.02 kg/m  as calculated from the following:    Height as of this encounter: 1.676 m (5' 6\").    Weight as of this encounter: 118.1 kg (260 lb 5.8 oz).   # Severe Malnutrition: based on nutrition assessment                 Disposition Plan     Medically Ready for Discharge: Anticipated in 5+ Days           The patient's care was discussed with the Attending Physician, Dr. Jose Martin Nath " .    Heather Garrett CNP  Hospitalist Service, GOLD TEAM 1  [facility]  Securely message with [employer] (more info)  Text page via [employer] Paging/Directory   See signed in provider for up to date coverage information  ______________________________________________________________________    Interval History   Sherwin is resting in bed.  Unable to obtain ROS due to respiratory status.       Physical Exam   Vital Signs: Temp: (!) 95.5  F (35.3  C) Temp src: Axillary BP: 127/62 Pulse: 75   Resp: 20 SpO2: 99 % O2 Device: BiPAP/CPAP    Weight: 260 lbs 5.81 oz    GENERAL: Well nourished, well developed.  Appears comfortable.   HEENT: Normocephalic, atraumatic. Anicteric sclera. Mucous membranes moist.   CV: RRR. S1, S2. Trace murmur.   RESPIRATORY: Effort normal on BIPAP. Lungs CTAB with no wheezing, rales, or rhonchi.   GI: Abdomen soft and non distended, bowel sounds present x all 4 quadrants. No tenderness, rebound, or guarding.   NEUROLOGICAL: No focal deficits.   MUSCULOSKELETAL: No joint swelling or tenderness. Moves all extremities.   EXTREMITIES: No gross deformities. Legs in bilateral edema wraps.   SKIN: Grossly warm, dry, and intact. No jaundice. No rashes.       Medical Decision Making       65 MINUTES SPENT BY ME on the date of service doing chart review, history, exam, documentation & further activities per the note.      Data     I have personally reviewed the following data over the past 24 hrs:    23.4 (H)  \   7.3 (L)   / 290     138 110 (H) 36.2 (H) /  85   3.7 22 1.53 (H) \       Imaging results reviewed over the past 24 hrs:   No results found for this or any previous visit (from the past 24 hour(s)).

## 2024-09-10 NOTE — PROGRESS NOTES
Neuro: Responds to touch with moaning. Does not follow commands, respond verbally or open eyes.  Cardiac: SR. VSS. Afebrile.   Respiratory: Sating >92% on Hi-flow NC 45% FiO2, 30 L.   GI/: Miller in place. Missing documentation for output this AM, suspect adequate. No BM this shift.  Diet/appetite: No oral intake this shift.   Activity:  Assist of 2 with lift, turn and repo Q2 hrs.  Pain: Frequently moaning in pain at rest or with touch/repo. Improved non-verbal indicators with Dilaudid.  Skin: No new deficits noted.  LDA's: R DL PICC- Purple: infusing hep gg @2250 units/hr, AM recheck; Red- TKO/antibiotics. R PIV- SL.    Shift Updates: Palliative consulted today, morphine discontinued, Dilaudid and Haldol added for pain and agitation management. Pt status discussed with wife, Ludmila, and decision made to update code status to DNR/DNI. Respiratory cultures drawn, resulted negative. CT of chest and head completed. Sitter at bedside for most of day, discontinued this evening after pt demonstrated stability off BiPAP and on HFNC.    Plan: Continue with POC. Notify primary team with changes.

## 2024-09-10 NOTE — PLAN OF CARE
"/62 (BP Location: Right arm)   Pulse 75   Temp (!) 95.5  F (35.3  C) (Axillary)   Resp 20   Ht 1.676 m (5' 6\")   Wt 118.1 kg (260 lb 5.8 oz)   SpO2 99%   BMI 42.02 kg/m       Assumed cares [id number]    Afebrile. VSS on 35% FiO2 BiPAP, maintaining SpO2 >92%. Bedside attendant in place for safety. Lethargic, unable to respond to orientation questions.     Moaning and crying in pain frequently, PAINAD up to 9/10, PRN morphine given x4 w some relief. Repositionings maintained Q2hr as tolerated.    No signs of nausea. No oral intake this shift. Miller in place w good output. Small bloody vaginal drainage w x1 riley sized clot. LBM 9/3.     Hep gtt infusing @2500 units/hr, AM level 0.44, next check tomorrow AM. Dextrose gtt infusing @50mL/hr. BG's 81-85. AM labs stable, no replacements indicated.     Plan for palliative consult today. Continue w plan of care & notify MD oconnell changes.     Problem: Adult Inpatient Plan of Care  Goal: Plan of Care Review  Description: The Plan of Care Review/Shift note should be completed every shift.  The Outcome Evaluation is a brief statement about your assessment that the patient is improving, declining, or no change.  This information will be displayed automatically on your shift  note.  Outcome: Not Progressing  Goal: Patient-Specific Goal (Individualized)  Description: You can add care plan individualizations to a care plan. Examples of Individualization might be:  \"Parent requests to be called daily at 9am for status\", \"I have a hard time hearing out of my right ear\", or \"Do not touch me to wake me up as it startles  me\".  Outcome: Not Progressing  Goal: Optimal Comfort and Wellbeing  Outcome: Not Progressing  Intervention: Provide Person-Centered Care  Recent Flowsheet Documentation  Taken 9/10/2024 0300 by Ramin Edwards, RN  Dimas Relationship/Rapport:   care explained   emotional support provided  Taken 9/9/2024 2000 by Ramin Edwards, RN  Trust " Relationship/Rapport:   care explained   emotional support provided  Goal: Readiness for Transition of Care  Outcome: Not Progressing     Problem: Pain Acute  Goal: Optimal Pain Control and Function  Outcome: Not Progressing  Intervention: Prevent or Manage Pain  Recent Flowsheet Documentation  Taken 9/10/2024 0400 by Ramin Edwards RN  Medication Review/Management:   medications reviewed   high-risk medications identified  Taken 9/10/2024 0300 by Ramin Edwards RN  Sleep/Rest Enhancement:   awakenings minimized   comfort measures   noise level reduced   regular sleep/rest pattern promoted   relaxation techniques promoted   room darkened  Medication Review/Management:   medications reviewed   high-risk medications identified  Taken 9/10/2024 0000 by Ramin Edwards RN  Medication Review/Management:   medications reviewed   high-risk medications identified  Taken 9/9/2024 2000 by Ramin Edwards RN  Sleep/Rest Enhancement:   awakenings minimized   comfort measures   noise level reduced   regular sleep/rest pattern promoted   relaxation techniques promoted   room darkened  Medication Review/Management:   medications reviewed   high-risk medications identified  Intervention: Optimize Psychosocial Wellbeing  Recent Flowsheet Documentation  Taken 9/10/2024 0300 by Ramin Edwards RN  Supportive Measures:   active listening utilized   relaxation techniques promoted  Taken 9/9/2024 2000 by Ramin Edwards RN  Supportive Measures:   active listening utilized   relaxation techniques promoted     Problem: Adult Inpatient Plan of Care  Goal: Absence of Hospital-Acquired Illness or Injury  Outcome: Progressing  Intervention: Identify and Manage Fall Risk  Recent Flowsheet Documentation  Taken 9/10/2024 0400 by Ramin Edwards RN  Safety Promotion/Fall Prevention:   activity supervised   assistive device/personal items within reach   bedside attendant   clutter free environment  maintained   nonskid shoes/slippers when out of bed   patient and family education   room near nurse's station   room organization consistent   safety round/check completed   supervised activity  Taken 9/10/2024 0300 by Ramin Edwards RN  Safety Promotion/Fall Prevention:   activity supervised   assistive device/personal items within reach   bedside attendant   clutter free environment maintained   nonskid shoes/slippers when out of bed   patient and family education   room near nurse's station   room organization consistent   safety round/check completed   supervised activity  Taken 9/10/2024 0200 by Ramin Edwards RN  Safety Promotion/Fall Prevention: safety round/check completed  Taken 9/10/2024 0000 by Ramin Edwards RN  Safety Promotion/Fall Prevention:   activity supervised   assistive device/personal items within reach   bedside attendant   clutter free environment maintained   nonskid shoes/slippers when out of bed   patient and family education   room near nurse's station   room organization consistent   safety round/check completed  Taken 9/9/2024 2200 by Ramin Edwards RN  Safety Promotion/Fall Prevention: safety round/check completed  Taken 9/9/2024 2000 by Ramin Edwards RN  Safety Promotion/Fall Prevention:   activity supervised   assistive device/personal items within reach   bedside attendant   clutter free environment maintained   nonskid shoes/slippers when out of bed   patient and family education   room near nurse's station   room organization consistent   safety round/check completed   supervised activity  Intervention: Prevent Skin Injury  Recent Flowsheet Documentation  Taken 9/10/2024 0300 by Ramin Edwards RN  Skin Protection:   adhesive use limited   transparent dressing maintained  Device Skin Pressure Protection:   absorbent pad utilized/changed   adhesive use limited   tubing/devices free from skin contact  Taken 9/9/2024 2000 by [facility] [place]  Ramin PAGE RN  Skin Protection:   adhesive use limited   transparent dressing maintained  Device Skin Pressure Protection:   absorbent pad utilized/changed   adhesive use limited   tubing/devices free from skin contact  Intervention: Prevent and Manage VTE (Venous Thromboembolism) Risk  Recent Flowsheet Documentation  Taken 9/10/2024 0300 by Ramin Edwards RN  VTE Prevention/Management: compression stockings on  Taken 9/9/2024 2000 by Ramin Edwards RN  VTE Prevention/Management: compression stockings on  Intervention: Prevent Infection  Recent Flowsheet Documentation  Taken 9/10/2024 0400 by Ramin Edwards RN  Infection Prevention:   cohorting utilized   environmental surveillance performed   equipment surfaces disinfected   hand hygiene promoted   personal protective equipment utilized   rest/sleep promoted   single patient room provided   visitors restricted/screened  Taken 9/10/2024 0300 by Ramin Edwards RN  Infection Prevention:   cohorting utilized   environmental surveillance performed   equipment surfaces disinfected   hand hygiene promoted   personal protective equipment utilized   rest/sleep promoted   single patient room provided   visitors restricted/screened  Taken 9/10/2024 0000 by Ramin Edwards RN  Infection Prevention:   cohorting utilized   environmental surveillance performed   equipment surfaces disinfected   hand hygiene promoted   personal protective equipment utilized   rest/sleep promoted   single patient room provided   visitors restricted/screened  Taken 9/9/2024 2000 by Ramin Edwards RN  Infection Prevention:   cohorting utilized   environmental surveillance performed   equipment surfaces disinfected   hand hygiene promoted   personal protective equipment utilized   rest/sleep promoted   single patient room provided   visitors restricted/screened     Problem: ARDS (Acute Respiratory Distress Syndrome)  Goal: Effective Oxygenation  Outcome:  Progressing  Intervention: Optimize Oxygenation, Ventilation and Perfusion  Recent Flowsheet Documentation  Taken 9/10/2024 0300 by Ramin Edwards RN  Lung Protection Measures: fluid excess minimized  Airway/Ventilation Management: airway patency maintained  Head of Bed (HOB) Positioning: HOB lowered  Taken 9/9/2024 2000 by Ramin Edwards RN  Lung Protection Measures: fluid excess minimized  Airway/Ventilation Management: airway patency maintained  Head of Bed (HOB) Positioning: HOB lowered     Problem: Anemia  Goal: Anemia Symptom Improvement  Outcome: Progressing  Intervention: Monitor and Manage Anemia  Recent Flowsheet Documentation  Taken 9/10/2024 0400 by Ramin Edwards RN  Safety Promotion/Fall Prevention:   activity supervised   assistive device/personal items within reach   bedside attendant   clutter free environment maintained   nonskid shoes/slippers when out of bed   patient and family education   room near nurse's station   room organization consistent   safety round/check completed   supervised activity  Taken 9/10/2024 0300 by Ramin Edwards RN  Safety Promotion/Fall Prevention:   activity supervised   assistive device/personal items within reach   bedside attendant   clutter free environment maintained   nonskid shoes/slippers when out of bed   patient and family education   room near nurse's station   room organization consistent   safety round/check completed   supervised activity  Taken 9/10/2024 0200 by Ramin Edwards RN  Safety Promotion/Fall Prevention: safety round/check completed  Taken 9/10/2024 0000 by Ramin Edwards RN  Safety Promotion/Fall Prevention:   activity supervised   assistive device/personal items within reach   bedside attendant   clutter free environment maintained   nonskid shoes/slippers when out of bed   patient and family education   room near nurse's station   room organization consistent   safety round/check completed  Taken  9/9/2024 2200 by Ramin Edwards, RN  Safety Promotion/Fall Prevention: safety round/check completed  Taken 9/9/2024 2000 by Ramin Edwards, RN  Safety Promotion/Fall Prevention:   activity supervised   assistive device/personal items within reach   bedside attendant   clutter free environment maintained   nonskid shoes/slippers when out of bed   patient and family education   room near nurse's station   room organization consistent   safety round/check completed   supervised activity   Goal Outcome Evaluation:

## 2024-09-10 NOTE — PROGRESS NOTES
Hematology/Oncology Fellow Note  09/10/2024    Hematology following for anticoagulation recommendations in the setting of pulmonary emboli and acute-on-chronic blood loss anemia.    Per chart review, patient remains on heparin drip, and hgb has remained largely stable without transfusion requirements for the past two days. Overall, from a Hematology standpoint we recommend lifelong anticoagulation given her continued thrombotic risk due to her malignancy. We recommend that once hemoglobin has stabilized and no further procedures are planned that she could be transitioned to twice daily apixaban.    Hematology to sign off at this time.     Thank you for contacting hematology/oncology, please don't hesitate to re-page with questions.    Pedro Raza MD  Hematology/Oncology/BMT Fellow PGY4  Pager: [phone]

## 2024-09-11 NOTE — PLAN OF CARE
"/51 (BP Location: Left arm)   Pulse 84   Temp 98.2  F (36.8  C) (Oral)   Resp 16   Ht 1.676 m (5' 6\")   Wt 118.1 kg (260 lb 5.8 oz)   SpO2 95%   BMI 42.02 kg/m       Assumed cares [id number]    Afebrile. VSS. SpO2 maintaining >90% on 30-35L high-flow NC 45% FiO2. Tele on, showing BBB w occasional PVC's.     Lethargic. Non-communicative aside from moaning/crying, unable to assess orientation status, not following commands. Bed alarm on for safety, frequent RN rounding maintained, Q2hr repositionings maintained as tolerated by pt and pain management.     CPOT pain rated up to 7, PRN dilaudid given x2, improving to CPOT pain level of 2. No signs of nausea.     No oral intake this shift, oral intake not advised w current orientation status. Heparin gtt infusing @2250 units/hr, next Xa level @0400. D10 infusing @50mL/hr, BG 79. Miller in place w good output. LBM 9/3 per charting. Continues to have moderate bloody vaginal discharge.     Bed bath/CHG wipes/linen change, mepilex and dressing changes done this evening. Lymphedema to see pt & rewrap legs @130pm tomorrow, please give pain medications prior to this. Continue w plan of care & notify MD oconnell changes.     Problem: Adult Inpatient Plan of Care  Goal: Plan of Care Review  Description: The Plan of Care Review/Shift note should be completed every shift.  The Outcome Evaluation is a brief statement about your assessment that the patient is improving, declining, or no change.  This information will be displayed automatically on your shift  note.  Outcome: Not Progressing  Goal: Patient-Specific Goal (Individualized)  Description: You can add care plan individualizations to a care plan. Examples of Individualization might be:  \"Parent requests to be called daily at 9am for status\", \"I have a hard time hearing out of my right ear\", or \"Do not touch me to wake me up as it startles  me\".  Outcome: Not Progressing  Goal: Optimal Comfort and Wellbeing  Outcome: Not " Progressing  Intervention: Monitor Pain and Promote Comfort  Recent Flowsheet Documentation  Taken 9/10/2024 2000 by Ramin Edwards RN  Pain Management Interventions: medication (see MAR)  Intervention: Provide Person-Centered Care  Recent Flowsheet Documentation  Taken 9/10/2024 2000 by Ramin Edwards RN  Trust Relationship/Rapport:   care explained   emotional support provided   empathic listening provided   reassurance provided  Goal: Readiness for Transition of Care  Outcome: Not Progressing     Problem: ARDS (Acute Respiratory Distress Syndrome)  Goal: Effective Oxygenation  Outcome: Not Progressing  Intervention: Optimize Oxygenation, Ventilation and Perfusion  Recent Flowsheet Documentation  Taken 9/10/2024 2300 by Ramin Edwards RN  Head of Bed (HOB) Positioning: HOB at 15 degrees  Taken 9/10/2024 2100 by Ramin Edwards RN  Head of Bed (HOB) Positioning: HOB at 20 degrees  Taken 9/10/2024 2000 by Ramin Edwards RN  Lung Protection Measures: fluid excess minimized  Airway/Ventilation Management: airway patency maintained     Problem: Pain Acute  Goal: Optimal Pain Control and Function  Outcome: Not Progressing  Intervention: Develop Pain Management Plan  Recent Flowsheet Documentation  Taken 9/10/2024 2000 by Ramin Edwards RN  Pain Management Interventions: medication (see MAR)  Intervention: Prevent or Manage Pain  Recent Flowsheet Documentation  Taken 9/10/2024 2000 by Ramin Edwards RN  Sensory Stimulation Regulation:   care clustered   quiet environment promoted  Sleep/Rest Enhancement:   awakenings minimized   comfort measures   noise level reduced   relaxation techniques promoted   room darkened  Medication Review/Management: medications reviewed  Intervention: Optimize Psychosocial Wellbeing  Recent Flowsheet Documentation  Taken 9/10/2024 2000 by Ramin Edwards RN  Supportive Measures:   positive reinforcement provided   problem-solving  facilitated   relaxation techniques promoted   self-care encouraged     Problem: Adult Inpatient Plan of Care  Goal: Absence of Hospital-Acquired Illness or Injury  Outcome: Progressing  Intervention: Identify and Manage Fall Risk  Recent Flowsheet Documentation  Taken 9/10/2024 2256 by Ramin Edwards RN  Safety Promotion/Fall Prevention: safety round/check completed  Taken 9/10/2024 2200 by Ramin Edwards RN  Safety Promotion/Fall Prevention: safety round/check completed  Taken 9/10/2024 2100 by Ramin Edwards RN  Safety Promotion/Fall Prevention: safety round/check completed  Taken 9/10/2024 2000 by Ramin Edwards RN  Safety Promotion/Fall Prevention:   activity supervised   assistive device/personal items within reach   clutter free environment maintained   increased rounding and observation   nonskid shoes/slippers when out of bed   patient and family education   room near nurse's station   room organization consistent   safety round/check completed  Intervention: Prevent Skin Injury  Recent Flowsheet Documentation  Taken 9/10/2024 2300 by Ramin Edwards RN  Body Position:   turned   right  Taken 9/10/2024 2100 by Ramin Edwards RN  Body Position:   turned   left  Taken 9/10/2024 2000 by Ramin Edwards RN  Body Position: (during hygeine cares) supine  Skin Protection:   adhesive use limited   incontinence pads utilized   pulse oximeter probe site changed   silicone foam dressing in place  Device Skin Pressure Protection:   adhesive use limited   tubing/devices free from skin contact  Intervention: Prevent and Manage VTE (Venous Thromboembolism) Risk  Recent Flowsheet Documentation  Taken 9/10/2024 2000 by Ramin Edwards RN  VTE Prevention/Management: (lymph wraps off, lymphedema to come back 9/11 @130pm) other (see comments)  Intervention: Prevent Infection  Recent Flowsheet Documentation  Taken 9/10/2024 2000 by Ramin Edwards RN  Infection  Prevention:   environmental surveillance performed   rest/sleep promoted     Problem: Anemia  Goal: Anemia Symptom Improvement  Outcome: Progressing  Intervention: Monitor and Manage Anemia  Recent Flowsheet Documentation  Taken 9/10/2024 2256 by Ramin Edwards, RN  Safety Promotion/Fall Prevention: safety round/check completed  Taken 9/10/2024 2200 by Ramin Edwards, RN  Safety Promotion/Fall Prevention: safety round/check completed  Taken 9/10/2024 2100 by Ramin Edwards, RN  Safety Promotion/Fall Prevention: safety round/check completed  Taken 9/10/2024 2000 by Ramin Edwards, RN  Safety Promotion/Fall Prevention:   activity supervised   assistive device/personal items within reach   clutter free environment maintained   increased rounding and observation   nonskid shoes/slippers when out of bed   patient and family education   room near nurse's station   room organization consistent   safety round/check completed  Fatigue Management: frequent rest breaks encouraged  Oral Nutrition Promotion: rest periods promoted   Goal Outcome Evaluation:

## 2024-09-11 NOTE — PLAN OF CARE
"/53 (BP Location: Left arm)   Pulse 71   Temp 97.5  F (36.4  C) (Axillary)   Resp 15   Ht 1.676 m (5' 6\")   Wt 118.1 kg (260 lb 5.8 oz)   SpO2 93%   BMI 42.02 kg/m      Started comfort cares this afternoon. Was on 35% FiO2 prior to starting comfort cares. Now on 2L for comfort, sats around 60%. Received dilaudid x4 for pain and air hunger. Received robinul x1. Pt was more comfortable this morning prior to starting to comfort cares, no crying or moaning. After starting comfort cares has been wheezing, had more labored breathing and cried out while being repositioned. Use lift if pt needs repositioned. Besides crying/moaning, no other verbal responses, not following commands, and not opening eyes. Wife is is unable to stay the night but would like to be called with any changes. Continue with POC    Problem: Adult Inpatient Plan of Care  Goal: Optimal Comfort and Wellbeing  Outcome: Not Progressing  Intervention: Monitor Pain and Promote Comfort  Recent Flowsheet Documentation  Taken 9/11/2024 0742 by Brown Graham, RN  Pain Management Interventions: medication (see MAR)  Intervention: Provide Person-Centered Care  Recent Flowsheet Documentation  Taken 9/11/2024 0810 by Brown Graham, RN  Trust Relationship/Rapport: care explained     Problem: ARDS (Acute Respiratory Distress Syndrome)  Goal: Effective Oxygenation  Outcome: Not Progressing     "

## 2024-09-11 NOTE — PROGRESS NOTES
PALLIATIVE CARE PROGRESS NOTE  [facility]     Patient Name: Sherwin Benites  Date of Admission: 9/4/2024      Recommendations & Counseling       GOALS OF CARE:   Comfort focused   Visit summary: met with wife danny at bedside after decision was made to transition to comfort care. Reviewed plan with abhishek Keys to discontinue bipap and use medications for comfort through end of life. Anticipate patient with die in the following hours to short days after bipap discontinuation and since time is so limited anticipate she will die in hospital and Danny is aware of this. Answered questions and offered support.    ADVANCE CARE PLANNING:  No health care directive on file. Per system policy, Surrogate Decision-makers for Patients With Diminished Decision-making Capacity offers guidance on possible decision-makers. Danny Benites (spouse) has been identified as a surrogate decision maker.   Code status: No CPR- Do NOT Intubate    MEDICAL MANAGEMENT:   IV Dilaudid 0.3-0.5mg q 15 min prn pain or air hunger  IV haldol 1-2mg q 6 hours prn agitation  I dont antiicpate she will need lorazepam for anxiety given her somnolence but would be reasonable to order IV lorazepam for backup option if haldol not effective.   Comfort care order set      PSYCHOSOCIAL/SPIRITUAL:  [place] community: Rabbi was here today.  Wife danny was able to be at bedside today. She does not expect any other family or friends to be visiting.     Palliative Care will continue to follow. Thank you for the consult and allowing us to aid in the care of Sherwin Benites.    These recommendations have been discussed with Dr Cee.    Hunter Hill MD  [facility], Palliative Care  Securely message with the Vocera Web Console (learn more here) or  Text page via [facility] Paging/Directory       Patient seen and evaluated with Dr. Hill.   Agree with assessment and recommendations.  Tamara Cee MD / Palliative Medicine  "  Securely message with the Vocera Web Console (learn more here)   Text page via [facility] Paging/Directory          Assessment          68-year-old with chronic respiratory failure due to COPD, RICKY, and obesity, who was admitted with bilateral pulmonary embolisms, acute respiratory failure, and a just diagnosed ovarian cancer.  Patient\"s mental and respiratory status continued to decline despite BiPAP.  Patient was tried off of BiPAP last night and switched to high flow oxygen.  Blood pH this morning was 7.14 and BiPAP resumed.  At no time is patient showed any increase in level of alertness.  Discussions yesterday with her spouse, Ludmila ultimately led to DNR/DNI order.  Meeting with primary team, Ludmila, and  today, has led to a shift and goals of care to comfort care only.      Copy Consult Assessment and add interval updates as needed       Interval History:       Patient/family narrative  Family meeting today with primary team resulting in focus to comfort care.  Met afterwards with wife at bedside to review plan, answer questions and offer support    Review of Systems:     Besides above, ROS was reviewed and is unremarkable        Physical Exam:   Temp:  [96.9  F (36.1  C)-98.2  F (36.8  C)] 97.5  F (36.4  C)  Pulse:  [71-90] 71  Resp:  [14-16] 15  BP: (103-124)/(48-67) 106/53  FiO2 (%):  [35 %-50 %] 35 %  SpO2:  [89 %-97 %] 93 %  260 lbs 5.81 oz    Physical Exam  Resting comfortably with BiPAP, no acute distress.  Does not respond to any verbal or noxious stimuli.  No indication of pain or other distress.  Heart:: Regular rate and rhythm  Lungs: Clear to auscultation  Lower extremities pitting edema              Data Reviewed:     Results for orders placed or performed during the hospital encounter of 09/04/24 (from the past 24 hour(s))   Glucose by meter   Result Value Ref Range    GLUCOSE BY METER POCT 79 70 - 99 mg/dL   Glucose by meter   Result Value Ref Range    GLUCOSE BY METER POCT 78 70 - 99 mg/dL "   Glucose by meter   Result Value Ref Range    GLUCOSE BY METER POCT 79 70 - 99 mg/dL   CBC with platelets differential    Narrative    The following orders were created for panel order CBC with platelets differential.  Procedure                               Abnormality         Status                     ---------                               -----------         ------                     CBC with platelets and d...[[id number]]  Abnormal            Final result                 Please view results for these tests on the individual orders.   Heparin Unfractionated Anti Xa Level   Result Value Ref Range    Anti Xa Unfractionated Heparin 0.48 For Reference Range, See Comment IU/mL    Narrative    Therapeutic Range: UFH: 0.25-0.50 IU/mL for low intensity dosing,  0.30-0.70 IU/mL for high intensity dosing DVT and PE.  This test is not validated for other direct factor X inhibitors (e.g. rivaroxaban, apixaban, edoxaban, betrixaban, fondaparinux) and should not be used for monitoring of other medications.   Blood gas venous   Result Value Ref Range    pH Venous 7.17 (LL) 7.32 - 7.43    pCO2 Venous 56 (H) 40 - 50 mm Hg    pO2 Venous 46 25 - 47 mm Hg    Bicarbonate Venous 21 21 - 28 mmol/L    Base Excess/Deficit Venous -7.6 (L) -3.0 - 3.0 mmol/L    FIO2 50     Oxyhemoglobin Venous 76 (H) 70 - 75 %    O2 Sat, Venous 78.5 (H) 70.0 - 75.0 %    Narrative    In healthy individuals, oxyhemoglobin (O2Hb) and oxygen saturation (SO2) are approximately equal. In the presence of dyshemoglobins, oxyhemoglobin can be considerably lower than oxygen saturation.   Basic metabolic panel   Result Value Ref Range    Sodium 146 (H) 135 - 145 mmol/L    Potassium 3.4 3.4 - 5.3 mmol/L    Chloride 109 (H) 98 - 107 mmol/L    Carbon Dioxide (CO2) 20 (L) 22 - 29 mmol/L    Anion Gap 17 (H) 7 - 15 mmol/L    Urea Nitrogen 35.5 (H) 8.0 - 23.0 mg/dL    Creatinine 1.63 (H) 0.51 - 0.95 mg/dL    GFR Estimate 34 (L) >60 mL/min/1.73m2    Calcium 7.0 (L) 8.8 -  10.4 mg/dL    Glucose 70 70 - 99 mg/dL   CBC with platelets and differential   Result Value Ref Range    WBC Count 19.2 (H) 4.0 - 11.0 10e3/uL    RBC Count 2.93 (L) 3.80 - 5.20 10e6/uL    Hemoglobin 6.8 (LL) 11.7 - 15.7 g/dL    Hematocrit 23.5 (L) 35.0 - 47.0 %    MCV 80 78 - 100 fL    MCH 23.2 (L) 26.5 - 33.0 pg    MCHC 28.9 (L) 31.5 - 36.5 g/dL    RDW 23.1 (H) 10.0 - 15.0 %    Platelet Count 257 150 - 450 10e3/uL    % Neutrophils 85 %    % Lymphocytes 6 %    % Monocytes 5 %    % Eosinophils 2 %    % Basophils 0 %    % Immature Granulocytes 2 %    NRBCs per 100 WBC 0 <1 /100    Absolute Neutrophils 16.4 (H) 1.6 - 8.3 10e3/uL    Absolute Lymphocytes 1.1 0.8 - 5.3 10e3/uL    Absolute Monocytes 0.9 0.0 - 1.3 10e3/uL    Absolute Eosinophils 0.4 0.0 - 0.7 10e3/uL    Absolute Basophils 0.1 0.0 - 0.2 10e3/uL    Absolute Immature Granulocytes 0.3 <=0.4 10e3/uL    Absolute NRBCs 0.0 10e3/uL   Lactic acid whole blood   Result Value Ref Range    Lactic Acid 0.6 (L) 0.7 - 2.0 mmol/L   ABO/Rh type and screen *Canceled*    Narrative    The following orders were created for panel order ABO/Rh type and screen.  Procedure                               Abnormality         Status                     ---------                               -----------         ------                     Adult Type and Screen[[id number]]                                                         Please view results for these tests on the individual orders.   Ketone Beta-Hydroxybutyrate Quantitative   Result Value Ref Range    Ketone (Beta-Hydroxybutyrate) Quantitative <0.18 <=0.30 mmol/L   Prepare red blood cells (unit)   Result Value Ref Range    Blood Component Type Red Blood Cells     Product Code E6405J61     Unit Status Transfused     Unit Number [id number]     CROSSMATCH Compatible     CODING SYSTEM [id number]     ISSUE DATE AND TIME [id number]     UNIT ABO/RH A+     UNIT TYPE ISBT 6200    ABO/Rh type and screen *Canceled*    Narrative    The  following orders were created for panel order ABO/Rh type and screen.  Procedure                               Abnormality         Status                     ---------                               -----------         ------                       Please view results for these tests on the individual orders.   Blood gas venous   Result Value Ref Range    pH Venous 7.17 (LL) 7.32 - 7.43    pCO2 Venous 62 (H) 40 - 50 mm Hg    pO2 Venous 46 25 - 47 mm Hg    Bicarbonate Venous 23 21 - 28 mmol/L    Base Excess/Deficit Venous -5.8 (L) -3.0 - 3.0 mmol/L    FIO2 50     Oxyhemoglobin Venous 76 (H) 70 - 75 %    O2 Sat, Venous 77.9 (H) 70.0 - 75.0 %    Narrative    In healthy individuals, oxyhemoglobin (O2Hb) and oxygen saturation (SO2) are approximately equal. In the presence of dyshemoglobins, oxyhemoglobin can be considerably lower than oxygen saturation.   Lactic acid whole blood   Result Value Ref Range    Lactic Acid 0.7 0.7 - 2.0 mmol/L   Glucose by meter   Result Value Ref Range    GLUCOSE BY METER POCT 86 70 - 99 mg/dL   Echo Complete   Result Value Ref Range    LVEF  60-65%     Narrative    [id number]  [id number]  [id number]  [id number]^KRYSTAL^LIBRA^SHERRIE BURNHAM     [facility]  Echocardiography Laboratory  [address] [place] [id number]     Name: TAMEKA CHANEY  MRN: [id number]  : 1955  Study Date: 2024 08:47 AM  Age: 68 yrs  Gender: Female  Patient Location: [facility]  Reason For Study: Pulmonary Edema  Ordering Physician: LIBRA RICE  Performed By: Helen Santamaria LIGIA     BSA: 2.2 m2  Height: 66 in  Weight: 260 lb  BP: 106/54 mmHg  ______________________________________________________________________________  Procedure  Echocardiogram with two-dimensional, color and spectral Doppler performed.  ______________________________________________________________________________  Interpretation Summary  Global and regional left ventricular function is normal  with an EF of 60-65%.  Moderate right ventricular dilation is present.  Global right ventricular function is mildly to moderately reduced.  The right ventricular systolic pressure is 48mmHg above the right atrial  pressure.  IVC diameter >2.1 cm collapsing <50% with sniff suggests a high RA pressure  estimated at 15 mmHg or greater.  Trivial pericardial effusion is present.  ______________________________________________________________________________  Left Ventricle  Global and regional left ventricular function is normal with an EF of 60-65%.     Right Ventricle  Moderate right ventricular dilation is present. Global right ventricular  function is mildly to moderately reduced.     Tricuspid Valve  Trace to mild tricuspid insufficiency is present. The right ventricular  systolic pressure is 48mmHg above the right atrial pressure.     Vessels  IVC diameter >2.1 cm collapsing <50% with sniff suggests a high RA pressure  estimated at 15 mmHg or greater.     Pericardium  Trivial pericardial effusion is present.  ______________________________________________________________________________  Doppler Measurements & Calculations  TR max maribell: 345.0 cm/sec  TR max P.6 mmHg     ______________________________________________________________________________  Report approved by: Liz Menon 2024 09:48 AM         Magnesium   Result Value Ref Range    Magnesium 1.9 1.7 - 2.3 mg/dL   Phosphorus   Result Value Ref Range    Phosphorus 3.9 2.5 - 4.5 mg/dL   Blood gas venous   Result Value Ref Range    pH Venous 7.24 (L) 7.32 - 7.43    pCO2 Venous 53 (H) 40 - 50 mm Hg    pO2 Venous 40 25 - 47 mm Hg    Bicarbonate Venous 23 21 - 28 mmol/L    Base Excess/Deficit Venous -4.6 (L) -3.0 - 3.0 mmol/L    FIO2 35     Oxyhemoglobin Venous 72 70 - 75 %    O2 Sat, Venous 74.5 70.0 - 75.0 %    Narrative    In healthy individuals, oxyhemoglobin (O2Hb) and oxygen saturation (SO2) are approximately equal. In the presence of  dyshemoglobins, oxyhemoglobin can be considerably lower than oxygen saturation.   Glucose by meter   Result Value Ref Range    GLUCOSE BY METER POCT 81 70 - 99 mg/dL         Medical Decision Making     Medical Decision Making       MANAGEMENT DISCUSSED with the following over the past 24 hours: medicine team, bedside RN, gyn/onc   NOTE(S)/MEDICAL RECORDS REVIEWED over the past 24 hours: medicine, nursing, gyn/onc  Tests REVIEWED in the past 24 hours:  - BMP  - CBC  - VBG  SUPPLEMENTAL HISTORY, in addition to the patient's history, over the past 24 hours obtained from:   - Spouse or significant other

## 2024-09-11 NOTE — PROGRESS NOTES
Gynecologic Oncology Progress Note  Date of service: 9/11/24    Visited with Mike' wife Ludmila.  Sherwin is currently being weaned off respiratory support, transitioning entirely to comfort care.     Ludmila expressed appreciation for all of the care provided during Sherwin's hospitalization.     Sylvia Torres MD, MS, FACOG, FACS  9/11/2024  4:12 PM

## 2024-09-11 NOTE — PLAN OF CARE
Neuro: lethargic, patient unresponsive to orientation questions. No communication besides moaning d/t pain.    Cardiac: SR BBB, HR 70-80. -120/50-60's. Afebrile.    Respiratory: Sating > 92% on BiPAP 35% FiO2. Origianlly patient on HFNC, provider ordered BiPAP d/t critical AM lab, pH = 7.17  GI/: Adequate urine output via garner. No BM this shift.   Diet/appetite: Regular diet ordered, no oral intake by patient, not safe for patient's current state. ACHS.  Activity:  Lift assist. Q3 repositioning to line up with pain medication administration.   Pain: PRN dilaudid given x3.  Skin: No new deficits noted.   LDA's: Garner. R DL PICC. R PIV.     Plan: Continue with POC. Notify primary team with changes.     Heparin gtt infusing @ 2250 units/hr, therapeutic, recheck in AM    D10 infusing at 50mL/hr    Critical Hgb = 6.8, 1unit RBC ordered and given.

## 2024-09-11 NOTE — PROGRESS NOTES
[facility]    Medicine Progress Note - Hospitalist Service, GOLD TEAM 1    Date of Admission:  9/4/2024    Assessment & Plan   Sherwin Benites is a 68 year old female with past medical history significant for chronic respiratory failure 2/2 COPD, RICKY, morbid obesity, chronic vaginal bleeding who presented to the ED with failure to thrive and dyspnea and found to have acute PE, new cervical mass, and severe anemia.    Updates today, 9/11:   - Discussed with pt's wife Ludmila, will move to comfort focused cares; will let Palliative Care know as well   - Code status changed to DNR/DNI yesterday evening by Gyn Onc after further discussions with Ludmila   - Will stop lab draws and VS checks         # Bilateral segmental and subsegmental acute PE s/p IVC filter on 9/6   # RLE DVT   # Acute on chronic hypoxic respiratory failure   # Hx RICKY on BIPAP PTA   BIB EMS with dyspnea and hypoxia.  Per d/w wife Ludmila, typically uses 3L NC over the last year.  Recently has been needing 4 to 4.5L NC.  CT PE on admission with bilateral PE, no e/o R heart strain on CT.  BNP elevated to 23,140 on admission and continues to rise, 33,043 today.  Started on heparin drip with close monitoring of Hgb as below.  VBGs 9/10 after ~ 24 hr on BIPAP still with slightly CO2 retention (likely chronic to some degree).  Repeat COVID today negative.  CT chest w/o contrast with new opacities bilaterally with other increase in bronchial wall thickening in the anterior paramediastinal regions bilaterally, concerning for worsening inflammation vs infection along with new pleural effusions and probably associated bibasilar edema in addition to atelectasis, pulmonary artery enlargement which may indicate pulmonary HTN, borderline pericardial effusion, and unchanged likely reactive mediastinal adenopathy.  Trialed high flow NC overnight and able to maintain sats, however VBGs and BMP c/w worsening metabolic  acidosis.  TTE this morning with normal EF 60-65%, moderate RV dilation, mild to moderately reduced global RV function, and RVSP 48+ above right atrial pressure.  Discussed with her wife Ludmila, will shift to comfort focus care at this time.     # Cervical mass concerning for malignancy s/p biopsy (9/6)   # Chronic vaginal bleeding s/p uterine artery embolization (9/6)   # Acute on chronic blood loss anemia 2/2 vaginal bleeding   # Suspicious left supraclavicular, lower right paraaortic, and left retrocaval region adenopathy c/f malignancy   On admission pt c/o abdominal pain.  CBC on admission significant for Hgb 4.9 requiring PRBCs x 3 units.  Given severity of anemia, CT A/P was obtained which revealed large cervical mass. Gyn Onc consulted.  Notes that she has had consistent non-cyclical bleeding through 50s and 60s.  Had speculum exam and cervical bx on 9/6, and underwent UAE and IVC filter placement on 9/6 w/ IR.  Pathology returned today with adenocarcinoma of endometrioid cell type, FIGO grade 2, and fragments of endocervical mucosa invaded by the tumor.  Significant pain with repositioning.  Gyn Onc consulted, appreciate recs; at this point, pt is not well enough to tolerate chemo.  Per discussion w/ Ludmila, Sherwin had previously verbalized that she would not want to undergo chemotherapy.  Gyn Onc met w/ patient and spouse again yesterday given poor prognosis in setting of worsening respiratory status without improvement despite treatment with BIPAP, heparin gtt for PE, and diuresis and code status was changed to DNR/DNI.  Palliative is following as well, will notify team of decision to proceed with comfort care.     # Progressive encephalopathy vs delirium   Unclear if 2/2 underlying pain, infection, hypoxia, vs other.  Has been on Cefepime IV x 4 days which can certainly contribute to mentation changes, especially in setting of MEENAKSHI.  Has received PRN doses of morphine, which may be slow to clear in  "setting of MEENAKSHI as well.  CT chest findings as above with concern for possible worsening infection vs fluid.  CT head negative.       # Generalized weakness   # Failure to thrive   # Weight loss   # Chronic post prandial abdominal pain   # Hypoglycemia   Now w/ signs of metabolic acidosis possibly related to starvation acidosis given anion gap.  Continuing IV dextrose for now but likely will discontinue with starting comfort orders.  Will stop lab checks.      # Intermittent tachycardia   Improving.  9/7 overnight tachycardic up to 130's. EKG showed bifascicular block. Troponin stable.  Received 1 PRBC this AM for Hgb 6.8.     # MEENAKSHI vs CKD   Cr is 1.53, most recent is 0.75 in 2021.  Briefly improved, but continuing to rise again.  Likely multifactorial d/t anemia, unable to tolerate PO intake, hypotension.   - Changing to comfort care  - No further labs         Diet: Snacks/Supplements Adult: Nargis Kyle Standard Oral Supplement; With Meals  Regular Diet Adult    DVT Prophylaxis: Heparin gtt   Miller Catheter: PRESENT, indication: Acute retention or obstruction  Lines: PRESENT      PICC 09/08/24 Double Lumen Right Brachial vein lateral-Site Assessment: WDL      Cardiac Monitoring: None  Code Status: No CPR- Do NOT Intubate      Clinically Significant Risk Factors         # Hypernatremia: Highest Na = 146 mmol/L in last 2 days, will monitor as appropriate      # Hypoalbuminemia: Lowest albumin = 2.3 g/dL at 9/8/2024  7:49 PM, will monitor as appropriate    # Acute Kidney Injury, unspecified: based on a >150% or 0.3 mg/dL increase in last creatinine compared to past 90 day average, will monitor renal function            # Severe Obesity: Estimated body mass index is 42.02 kg/m  as calculated from the following:    Height as of this encounter: 1.676 m (5' 6\").    Weight as of this encounter: 118.1 kg (260 lb 5.8 oz).   # Severe Malnutrition: based on nutrition assessment                 Disposition Plan     Medically " Ready for Discharge: Anticipated in 5+ Days           The patient's care was discussed with the Attending Physician, Dr. Deric Olmstead .    Heather Garrett, [facility]  Hospitalist Service, GOLD TEAM [address] [place] [address]  Securely message with [employer] (more info)  Text page via [facility] Paging/Directory   See signed in provider for up to date coverage information  ______________________________________________________________________    Interval History   Sherwin is resting in bed.  Unable to obtain ROS due to respiratory status.  Ludmila is present at the bedside.  She shares memories of Sherwin and their life together with us.  Her rabbi is visiting today to comfort her through these decisions.  Would like to focus on Sherwin's comfort at this time.      Physical Exam   Vital Signs: Temp: 97.1  F (36.2  C) Temp src: Axillary BP: 124/59 Pulse: 72   Resp: 15 SpO2: 96 % O2 Device: BiPAP/CPAP Oxygen Delivery: 35 LPM  Weight: 260 lbs 5.81 oz    GENERAL: Well nourished, well developed.  NAD.   HEENT: Normocephalic, atraumatic. Anicteric sclera. Mucous membranes moist.   CV: RRR. S1, S2. Trace murmur.   RESPIRATORY: Effort normal on BIPAP. Lungs CTAB with no wheezing, rales, or rhonchi.   GI: Abdomen soft and non distended, bowel sounds present x all 4 quadrants. No tenderness, rebound, or guarding.   NEUROLOGICAL: No focal deficits.   MUSCULOSKELETAL: No joint swelling or tenderness. Moves all extremities.   EXTREMITIES: No gross deformities. Legs in bilateral edema wraps. Bilateral feet in soft boots.   SKIN: Grossly warm, dry, and intact. No jaundice. No rashes.       Medical Decision Making       60 MINUTES SPENT BY ME on the date of service doing chart review, history, exam, documentation & further activities per the note.      Data     I have personally reviewed the following data over the past 24 hrs:    19.2 (H)  \   6.8 (LL)   / 257     146 (H) 109 (H) 35.5 (H) /  70   3.4 20 (L) 1.63 (H) \      Trop: N/A BNP: N/A     Procal: N/A CRP: N/A Lactic Acid: 0.7         Imaging results reviewed over the past 24 hrs:   Recent Results (from the past 24 hour(s))   CT Head w/o Contrast    Narrative    CT HEAD W/O CONTRAST 9/10/2024 2:48 PM    History: Encephalopathy vs delirium, evaluate for acute process   ICD-10:    Comparison: None available    Technique: Using multidetector thin collimation helical acquisition  technique, axial, coronal and sagittal CT images from the skull base  to the vertex were obtained without intravenous contrast.   (topogram) image(s) also obtained and reviewed.    Findings: There is no intracranial hemorrhage, mass effect, or midline  shift. Gray/white matter differentiation in both cerebral hemispheres  is preserved. Ventricles are proportionate to the cerebral sulci. The  basal cisterns are clear.    The bony calvaria and the bones of the skull base are normal. Mucous  retention cyst in the right maxillary sinus, otherwise the visualized  portions of the paranasal sinuses and mastoid air cells are clear.      Impression    Impression:  No acute intracranial pathology.     FREDERICK VO MD         SYSTEM ID:  [id number]   CT Chest w/o Contrast    Narrative    CT chest without contrast    INDICATION: Worsening hypoxia requiring BiPAP    COMPARISON: Plain film yesterday. CT pulmonary angiogram 9/4/2024    FINDINGS: No contrast. The included thyroid appears unremarkable. A  right upper extremity PICC line tip is in the distal SVC just before  the right atrium. No obviously abnormal breast tissues. Edema in the  chest wall subcutaneous tissues bilaterally.  Small bilateral pleural effusions. Borderline pericardial effusion.  The included upper abdomen appears unremarkable.  Bone detail shows mild dextrocurvature centered at the midthoracic  spine. Multilevel osteophyte formation throughout the thoracic spine  mostly on the right. Mild lower cervical and upper lumbar spondylosis  is  also. No suspicious sclerotic or lytic/destructive lesion.  Mild-to-moderate osteoarthritic changes at the glenohumeral joints  bilaterally with joint space narrowing and osteophyte formation.  Multistation mediastinal lymph nodes appears similar to previous.  Heart size normal. Aortic caliber normal. The pulmonary artery however  is mildly enlarged approximately 3.7 cm.    Detail of the lungs shows areas of bilateral hilar peribronchial  thickening bilaterally and also in the right upper lobe. Scattered  groundglass opacities in the upper lobes bilaterally.  Comparison with previous shows the perihilar an anterior  paramediastinal bronchial wall thickening to the slightly more  notable. Lower lobe peribronchial opacities are also slightly more  notable. Pleural effusions and bibasilar atelectasis are also new.      Impression    IMPRESSION: New opacities bilaterally lungs with other increase in  bronchial wall thickening in the anterior paramediastinal regions  bilaterally. Findings concerning for worsening inflammation/infection  along with new pleural effusions and probable associated bibasilar  edema in addition to atelectasis.  Pulmonary artery enlargement which may indicate pulmonary  hypertension.  Unchanged probable reactive mediastinal adenopathy.  Borderline pericardial effusion.    LIVAN POWER MD         SYSTEM ID:  [id number]

## 2024-09-11 NOTE — PROGRESS NOTES
Was notified of patient having a VBG with pH of 7.17. The lab was part of AM labs  Recheck was consistent  On addnl review of her BMP and lactate, she has metabolic acidosis with an anion gap (LA is normal). She is unfortunately not able to compensate well with her respirations, so restarted her on Bipap  Spoke briefly with ICU given her pH and discussed that the family only wants intubation if she has a reversible condition. So, no changes.   No utility of bicarb drip  given her bicarb is at 20   If pH goes down further, we could make a case for bicarb for cardiac stability  Will recheck her VBG after 1 Hour of Bipap  ICU also suggested checking ketones to r/o starvation ketoacidosis. Ordered  I updated the RN of plan  I called the wife and left a VM at [phone]. Also called the home phone listed in contacts which is incorrect

## 2024-09-12 NOTE — PROGRESS NOTES
1900 - 0700:    D: 68 year old female with past medical history significant for chronic respiratory failure 2/2 COPD, RICKY, morbid obesity, chronic vaginal bleeding who presented to the ED with failure to thrive and dyspnea and found to have acute PE, new cervical mass, and severe anemia.      I: Comfort cares/pain control.    Neuro: HILARIO. Somnolent. Unable to follow commands, no verbal response other than moaning.  Cardiac: No VS ordered/taken.  Respiratory: 2L Oxymask for comfort. Labored breathing.  GI/: Miller in place. No BM overnight. Has moderate vaginal bloody discharge.  Skin: Repositioned q2-3 hours.  LDAs: DL PICC SL. PIV SL.  Activity: Bedfast/lift assist.  Pain: Given Dilaudid for comfort/pain/air hunger. Received Robinul x2.      A: Update wife, Ludmila with any changes.     P: Continue to monitor Pt status and report changes to treatment team.

## 2024-09-12 NOTE — PROGRESS NOTES
CLINICAL NUTRITION SERVICES    Informed of the decision made to change pt s status to comfort care. Nutrition interventions discontinued and no further interventions planned at this time. RD can be consulted if needed.    RD signing off on 9/12/2024.    Shakila Suárez, MPH, RDN, LD  5C (BMT) MEL Elliott [5C Clinical Dietitian]   Weekend/Holiday: Vocera - Weekend Clinical Dietitian

## 2024-09-12 NOTE — PROGRESS NOTES
[facility]    Medicine Progress Note - Hospitalist Service, GOLD TEAM 1    Date of Admission:  9/4/2024    Assessment & Plan   Sherwin Benites is a 68 year old female with past medical history significant for chronic respiratory failure 2/2 COPD, RICKY, morbid obesity, chronic vaginal bleeding who presented to the ED with failure to thrive and dyspnea and found to have acute PE, new cervical mass, and severe anemia.    Updates today, 9/11:   - Discussed with pt's wife Ludimla, will move to comfort focused cares; will let Palliative Care know as well   - Code status changed to DNR/DNI yesterday evening by Gyn Onc after further discussions with Ludmila   - Will stop lab draws and VS checks         # Bilateral segmental and subsegmental acute PE s/p IVC filter on 9/6   # RLE DVT   # Acute on chronic hypoxic respiratory failure   # Hx RICKY on BIPAP PTA   BIB EMS with dyspnea and hypoxia.  Per d/w wife Ludmila, typically uses 3L NC over the last year.  Recently has been needing 4 to 4.5L NC.  CT PE on admission with bilateral PE, no e/o R heart strain on CT.  BNP elevated to 23,140 on admission and continues to rise, 33,043 today.  Started on heparin drip with close monitoring of Hgb as below.  VBGs 9/10 after ~ 24 hr on BIPAP still with slightly CO2 retention (likely chronic to some degree).  Repeat COVID today negative.  CT chest w/o contrast with new opacities bilaterally with other increase in bronchial wall thickening in the anterior paramediastinal regions bilaterally, concerning for worsening inflammation vs infection along with new pleural effusions and probably associated bibasilar edema in addition to atelectasis, pulmonary artery enlargement which may indicate pulmonary HTN, borderline pericardial effusion, and unchanged likely reactive mediastinal adenopathy.  Trialed high flow NC overnight and able to maintain sats, however VBGs and BMP c/w worsening metabolic  acidosis.  TTE this morning with normal EF 60-65%, moderate RV dilation, mild to moderately reduced global RV function, and RVSP 48+ above right atrial pressure.  Discussed with her wife Ludmila, will shift to comfort focus care at this time.     # Cervical mass concerning for malignancy s/p biopsy (9/6)   # Chronic vaginal bleeding s/p uterine artery embolization (9/6)   # Acute on chronic blood loss anemia 2/2 vaginal bleeding   # Suspicious left supraclavicular, lower right paraaortic, and left retrocaval region adenopathy c/f malignancy   On admission pt c/o abdominal pain.  CBC on admission significant for Hgb 4.9 requiring PRBCs x 3 units.  Given severity of anemia, CT A/P was obtained which revealed large cervical mass. Gyn Onc consulted.  Notes that she has had consistent non-cyclical bleeding through 50s and 60s.  Had speculum exam and cervical bx on 9/6, and underwent UAE and IVC filter placement on 9/6 w/ IR.  Pathology returned today with adenocarcinoma of endometrioid cell type, FIGO grade 2, and fragments of endocervical mucosa invaded by the tumor.  Significant pain with repositioning.  Gyn Onc consulted, appreciate recs; at this point, pt is not well enough to tolerate chemo.  Per discussion w/ Ludmila, Sherwin had previously verbalized that she would not want to undergo chemotherapy.  Gyn Onc met w/ patient and spouse again yesterday given poor prognosis in setting of worsening respiratory status without improvement despite treatment with BIPAP, heparin gtt for PE, and diuresis and code status was changed to DNR/DNI.  Palliative is following as well, will notify team of decision to proceed with comfort care.     # Progressive encephalopathy vs delirium   Unclear if 2/2 underlying pain, infection, hypoxia, vs other.  Has been on Cefepime IV x 4 days which can certainly contribute to mentation changes, especially in setting of MEENAKSHI.  Has received PRN doses of morphine, which may be slow to clear in  "setting of MEENAKSHI as well.  CT chest findings as above with concern for possible worsening infection vs fluid.  CT head negative.       # Generalized weakness   # Failure to thrive   # Weight loss   # Chronic post prandial abdominal pain   # Hypoglycemia   Now w/ signs of metabolic acidosis possibly related to starvation acidosis given anion gap.  Continuing IV dextrose for now but likely will discontinue with starting comfort orders.  Will stop lab checks.      # Intermittent tachycardia   Improving.  9/7 overnight tachycardic up to 130's. EKG showed bifascicular block. Troponin stable.  Received 1 PRBC this AM for Hgb 6.8.     # MEENAKSHI vs CKD   Cr is 1.53, most recent is 0.75 in 2021.  Briefly improved, but continuing to rise again.  Likely multifactorial d/t anemia, unable to tolerate PO intake, hypotension.   - Changing to comfort care  - No further labs         Diet: Regular Diet Adult  Snacks/Supplements Adult: Other; ONS PRN; With Meals    DVT Prophylaxis: Heparin gtt   Miller Catheter: PRESENT, indication: Acute retention or obstruction  Lines: PRESENT    { TIP  Link to [employer]atar to review     :32547}  PICC 09/08/24 Double Lumen Right Brachial vein lateral-Site Assessment: WDL      Cardiac Monitoring: None  Code Status: No CPR- Do NOT Intubate      Clinically Significant Risk Factors   { TIP  Diagnoses will continue to appear throughout the admission.  :[id number]}      # Hypernatremia: Highest Na = 146 mmol/L in last 2 days, will monitor as appropriate      # Hypoalbuminemia: Lowest albumin = 2.3 g/dL at 9/8/2024  7:49 PM, will monitor as appropriate    # Acute Kidney Injury, unspecified: based on a >150% or 0.3 mg/dL increase in last creatinine compared to past 90 day average, will monitor renal function            # Severe Obesity: Estimated body mass index is 42.02 kg/m  as calculated from the following:    Height as of this encounter: 1.676 m (5' 6\").    Weight as of this encounter: 118.1 kg (260 lb 5.8 oz).   # " Severe Malnutrition: based on nutrition assessment                 Disposition Plan   {TIP  It is advised to update the Medical Readiness for Discharge [MRD] daily, until the patient is 'Ready Now.' Last Documentation- Anticipated in 5+ Days  . Use the SmartList below to update for today:[id number]}  Medically Ready for Discharge: Anticipated in 5+ Days           The patient's care was discussed with the Attending Physician, Dr. Deric Olmstead .    Heather Garrett [facility]  Hospitalist Service, GOLD TEAM [address] [place] [address]  Securely message with [employer] info)  Text page via [facility] Paging/Directory   See signed in provider for up to date coverage information  ______________________________________________________________________    Interval History   Sherwin is resting in bed.  Unable to obtain ROS due to respiratory status.  Ludmila is present at the bedside.  She shares memories of Sherwin and their life together with us.  Her rabbi is visiting today to comfort her through these decisions.  Would like to focus on Sherwin's comfort at this time.      Physical Exam   Vital Signs: Temp: 97.5  F (36.4  C) Temp src: Axillary BP: 106/53 Pulse: 71   Resp: 15 SpO2: 93 % O2 Device: None (Room air) Oxygen Delivery: 35 LPM  Weight: 260 lbs 5.81 oz    GENERAL: Well nourished, well developed.  NAD.   HEENT: Normocephalic, atraumatic. Anicteric sclera. Mucous membranes moist.   CV: RRR. S1, S2. Trace murmur.   RESPIRATORY: Effort normal on BIPAP. Lungs CTAB with no wheezing, rales, or rhonchi.   GI: Abdomen soft and non distended, bowel sounds present x all 4 quadrants. No tenderness, rebound, or guarding.   NEUROLOGICAL: No focal deficits.   MUSCULOSKELETAL: No joint swelling or tenderness. Moves all extremities.   EXTREMITIES: No gross deformities. Legs in bilateral edema wraps. Bilateral feet in soft boots.   SKIN: Grossly warm, dry, and intact. No jaundice. No rashes.       Medical Decision  Making   { TIP   MDM Calculator    MDM grid (w/ times)    Coding Support Chat  Billing is now based on time OR medical decision making complexity. Medical decision making included in your A&P does NOT need to be re-documented here.    :[id number]}    60 MINUTES SPENT BY ME on the date of service doing chart review, history, exam, documentation & further activities per the note.      Data   { TIP    No CBC, BMP, LFT, lipase, coag, troponin, BNP, TSH, A1C, CRP, procal, lactic acid, D-dimer, fibrinogen, ferritin, retic, or LDH results found in the past 24 hrs      :35710}      Imaging results reviewed over the past 24 hrs:   No results found for this or any previous visit (from the past 24 hour(s)).

## 2024-09-12 NOTE — CONSULTS
SPIRITUAL HEALTH SERVICES - Consult Note  [facility] 5C  Referral Source/Reason for Visit: Consult and family request     Summary and Recommendations -  I visited with Sherwin's wife, Ludmila, who arrived to the hospital after Sherwin .   Ludmila shared more stories about her and Sherwin's life together.   She also shared that she has a good support system and that her Rabbi had visited yesterday.      Plan: No plans for follow up.    Mary Meyers  Staff

## 2024-09-12 NOTE — PLAN OF CARE
IP Edema/OT Discharge Summary    Reason for therapy discharge:    Change in medical status.    Progress towards therapy goal(s). See goals on Care Plan in [facility] electronic health record for goal details.  Goals partially met.  Barriers to achieving goals:   transition to comfort cares.    Therapy recommendation(s):    Pt transitioned to comfort cares and will be receiving comfort/conservative measures only for edema. No current OT needs. Will sign off at this time.

## 2024-09-12 NOTE — DEATH PRONOUNCEMENT
MD DEATH PRONOUNCEMENT    Called to pronounce Sherwin Benites dead.    Physical Exam: Unresponsive to noxious stimuli, Spontaneous respirations absent, Breath sounds absent, and Heart sounds absent    Patient was pronounced dead at 11:55 AM, 2024.    Preliminary Cause of Death: Respiratory arrest     Active Problems:    Acute and chronic respiratory failure with hypoxia (H)    Multiple subsegmental pulmonary emboli without acute cor pulmonale (H)       Infectious disease present?: NO    Communicable disease present? (examples: HIV, chicken pox, TB, Ebola, CJD) :  NO    Multi-drug resistant organism present? (example: MRSA): NO    Please consider an autopsy if any of the following exist:  NO Unexpected or unexplained death during or following any dental, medical, or surgical diagnostic treatment procedures.   NO Death of mother at or up to seven days after delivery.     NO All  and pediatric deaths.     NO Death where the cause is sufficiently obscure to delay completion of the death certificate.   NO Deaths in which autopsy would confirm a suspected illness/condition that would affect surviving family members or recipients of transplanted organs.     The following deaths must be reported to the 's Office:  NO A death that may be due entirely or in part to any factors other than natural disease (recent surgery, recent trauma, suspected abuse/neglect).   NO A death that may be an accident, suicide, or homicide.     NO Any sudden, unexpected death in which there is no prior history of significant heart disease or any other condition associated with sudden death.   NO A death under suspicious, unusual, or unexpected circumstances.    NO Any death which is apparently due to natural causes but in which the  does not have a personal physician familiar with the patient s medical history, social, or environmental situation or the circumstances of the terminal event.   NO Any death  apparently due to Sudden Infant Death Syndrome.     NO Deaths that occur during, in association with, or as consequences of a diagnostic, therapeutic, or anesthetic procedure.   NO Any death in which a fracture of a major bone has occurred within the past (6) six months.   NO A death of persons note seen by their physician within 120 days of demise.     NO Any death in which the  was an inmate of a public institution or was in the custody of Law Enforcement personnel.   NO  All unexpected deaths of children   NO Solid organ donors   NO Unidentified bodies   NO Deaths of persons whose bodies are to be cremated or otherwise disposed of so that the bodies will later be unavailable for examination;   NO Deaths unattended by a physician outside of a licensed healthcare facility or licensed residential hospice program   NO Deaths occurring within 24 hours of arrival to a health care facility if death is unexpected.    NO Deaths associated with the decedent s employment.   NO Deaths attributed to acts of terrorism.   NO Any death in which there is uncertainty as to whether it is a medical examiner s care should be discussed with the medical investigator.        Body disposition: Autopsy not discussed as tried to reach out to Marsha but number goes straight to voice mail. Informed nurse to let me know if there are other numbers but we will also try to call again

## 2024-09-12 NOTE — SIGNIFICANT EVENT
Patient was pronounced dead at 1155 this  morning, patient was on comfort cares, provider was notified, several calls have been made to life partner Ludmila Delmis with no answer or response, left message to call the unit, awaiting response. Organ donation line was contacted per protocol.

## 2024-09-12 NOTE — DISCHARGE SUMMARY
"[facility]  Hospitalist Discharge Summary      Date of Admission:  2024  Date of Discharge:  2024  Discharging Provider: Heather Garrett CNP, Deric Olmstead MD   Discharge Service: Hospitalist Service, GOLD TEAM 1    Discharge Diagnoses   # Acute on chronic hypoxic respiratory failure   # Bilateral segmental and subsegmental acute PE s/p IVC filter on    # RLE DVT   # Hx RICKY on BIPAP PTA   # Cervical mass concerning for malignancy s/p biopsy ()   # Chronic vaginal bleeding s/p uterine artery embolization ()   # Acute on chronic blood loss anemia 2/2 vaginal bleeding   # Suspicious left supraclavicular, lower right paraaortic, and left retrocaval region adenopathy c/f malignancy   # Progressive encephalopathy vs delirium  # Generalized weakness   # Failure to thrive   # Weight loss   # Chronic post prandial abdominal pain   # Hypoglycemia   # Intermittent tachycardia   # MEENAKSHI vs CKD   Deep Tissue Pressure Injury (DTPI), hospital acquired    Chronic Heart Failure with Preserved Ejection Fraction   -Deep Tissue Pressure Injury (DTPI) to bridge of nose, hospital acquired     Clinically Significant Risk Factors     # Severe Obesity: Estimated body mass index is 42.02 kg/m  as calculated from the following:    Height as of this encounter: 1.676 m (5' 6\").    Weight as of this encounter: 118.1 kg (260 lb 5.8 oz).  # Severe Malnutrition: based on nutrition assessment      Follow-ups Needed After Discharge   N/a     Unresulted Labs Ordered in the Past 30 Days of this Admission       No orders found from 2024 to 2024.            Discharge Disposition   Patient  during this admission after transition to comfort care.     Condition at discharge:     Hospital Course     Sherwin Benites is a 68 year old female with past medical history significant for chronic respiratory failure 2/2 COPD, RICKY, morbid obesity, chronic vaginal bleeding who presented to " the ED with failure to thrive and dyspnea and found to have acute PE, new cervical mass, and severe anemia.  Per d/w wife Ludmila, typically uses 3L NC over the last year.  Recently has been needing 4 to 4.5L NC.  CT PE on admission with bilateral PE, no e/o R heart strain on CT.  BNP elevated to 23,140 on admission and continued to rise.  Started on heparin drip with close monitoring of Hgb.  Had presented with Hgb 4.9 requiring 3 units PRBCs in setting of acute on chronic vaginal bleeding.  Had speculum exam and cervical bx on 9/6, and underwent UAE and IVC filter placement on 9/6 w/ IR. Pathology returned with adenocarcinoma of endometrioid cell type, FIGO grade 2, and fragments of endocervical mucosa invaded by the tumor.   Gyn Onc consulted, appreciate recs; at this point, pt is not well enough to tolerate chemo.  Per discussion w/ Ludmila, Sherwin had previously verbalized that she would not want to undergo chemotherapy.  Gyn Onc met w/ patient and spouse again yesterday given poor prognosis in setting of worsening respiratory status without improvement despite treatment with BIPAP, heparin gtt for PE, and diuresis and code status was changed to DNR/DNI.  VBGs 9/10 after ~ 24 hr on BIPAP still with slightly CO2 retention (likely chronic to some degree).  Repeat COVID and RVPs negative.  CT chest 9/10 w/o contrast with new opacities bilaterally with other increase in bronchial wall thickening in the anterior paramediastinal regions bilaterally, concerning for worsening inflammation vs infection along with new pleural effusions and probably associated bibasilar edema in addition to atelectasis, pulmonary artery enlargement which may indicate pulmonary HTN, borderline pericardial effusion, and unchanged likely reactive mediastinal adenopathy.  Trialed high flow NC overnight and able to maintain sats, however VBGs and BMP c/w worsening metabolic acidosis.  TTE morning 9/11 with normal EF 60-65%, moderate RV  dilation, mild to moderately reduced global RV function, and RVSP 48+ above right atrial pressure.  Discussed with her wife Ludmila afternoon 9/11 and decision was made to transition to comfort care.  Orders placed for symptomatic management and end of life care.  Sherwin appeared comfortable on exam this morning on nasal cannula.  RN notified the team around 11:45 that she appeared to have passed away.  On exam, pulses absent, no chest rise, no response to stimuli.  She was pronounced at 11:55am.  Please refer to death pronouncement by Dr. Deric Olmstead for further details.         Consultations This Hospital Stay   NURSING TO CONSULT FOR VASCULAR ACCESS CARE IP CONSULT  PHARMACY IP CONSULT  NURSING TO CONSULT FOR VASCULAR ACCESS CARE IP CONSULT  GYNECOLOGIC ONCOLOGY ADULT IP CONSULT  HEMATOLOGY ADULT IP CONSULT  PHYSICAL THERAPY ADULT IP CONSULT  OCCUPATIONAL THERAPY ADULT IP CONSULT  LYMPHEDEMA THERAPY IP CONSULT  NUTRITION SERVICES ADULT IP CONSULT  CARDIOLOGY GENERAL ADULT IP CONSULT  RADIATION ONCOLOGY IP CONSULT  INTERVENTIONAL RADIOLOGY ADULT/PEDS IP CONSULT  INTERVENTIONAL RADIOLOGY ADULT/PEDS IP CONSULT  PHARMACY TO DOSE VANCO  CARE MANAGEMENT / SOCIAL WORK IP CONSULT  WOUND OSTOMY CONTINENCE NURSE  IP CONSULT  VASCULAR ACCESS FOR PICC PLACEMENT ADULT IP CONSULT  PALLIATIVE CARE ADULT IP CONSULT  SPIRITUAL HEALTH SERVICES IP CONSULT  SPIRITUAL HEALTH SERVICES IP CONSULT    Code Status   No CPR- Do NOT Intubate    Time Spent on this Encounter   IHeather CNP, personally saw the patient today and spent less than or equal to 30 minutes discharging this patient.       Heather Garrett CNP  [facility] [address] [place] [id number]  Phone: [phone]  Fax: [phone]  ______________________________________________________________________    Physical Exam   Vital Signs: Temp: 97.5  F (36.4  C) Temp src: Axillary BP: 106/53 Pulse: 71   Resp: 15 SpO2: 93  % O2 Device: None (Room air) Oxygen Delivery: 35 LPM  Weight: 260 lbs 5.81 oz    GENERAL: No distress.  Absent movements.   HEENT: Normocephalic, atraumatic.   CV: Pulses absent.   RESPIRATORY: No breath sounds.  No chest rise.  NEUROLOGICAL: Absent movements.   SKIN: Cool and pale.          Primary Care Physician   Chiqui Gallegos    Discharge Orders   No discharge procedures on file.    Significant Results and Procedures   Most Recent 3 CBC's:  Recent Labs   Lab Test 09/11/24  0448 09/10/24  0345 09/09/24  0316   WBC 19.2* 23.4* 25.7*   HGB 6.8* 7.3* 7.5*   MCV 80 80 79    290 272     Most Recent 3 BMP's:  Recent Labs   Lab Test 09/11/24  1221 09/11/24  0908 09/11/24  0448 09/10/24  0804 09/10/24  0345 09/09/24  0937 09/09/24 0316   NA  --   --  146*  --  138  --  139   POTASSIUM  --   --  3.4  --  3.7  --  3.9   CHLORIDE  --   --  109*  --  110*  --  110*   CO2  --   --  20*  --  22  --  22   BUN  --   --  35.5*  --  36.2*  --  33.0*   CR  --   --  1.63*  --  1.53*  --  1.19*   ANIONGAP  --   --  17*  --  6*  --  7   HAYDEE  --   --  7.0*  --  8.1*  --  8.1*   GLC 81 86 70   < > 85   < > 76    < > = values in this interval not displayed.     Most Recent 2 LFT's:  Recent Labs   Lab Test 09/08/24  1949 09/06/24  2048   AST 17 18   ALT 11 <5   ALKPHOS 115 114   BILITOTAL 0.3 0.3     Most Recent 3 INR's:  Recent Labs   Lab Test 09/06/24  0023   INR 1.40*   ,   Results for orders placed or performed during the hospital encounter of 09/04/24   POC US ECHO LIMITED    Impression    Limited Bedside Cardiac Ultrasound, performed and interpreted by me.   Indication: Shortness of Breath.  Parasternal long axis, parasternal short axis, apical 4 chamber, and lung views were acquired.   Difficult views due to habitus    Findings:    Left ventricle appears to be with good ejection fraction.  No large pericardial effusion.  No B-lines bilaterally.    IMPRESSION: Appears to have grossly normal left ventricular  function..       XR Chest 2 Views    Narrative    Exam: XR CHEST 2 VIEWS, 9/4/2024 5:19 PM    Comparison: CT chest 4/19/2013    History: sob    Findings:  AP and lateral views of the chest.. Trachea is midline. Overall heart  size is within normal limits. Prominent pulmonary artery shadow. No  focal airspace opacity. No pneumothorax. The right costophrenic angle  is sharp. The left costophrenic angle is out of the field-of-view. The  visualized upper abdomen is unremarkable. No acute osseous  abnormalities. Degenerative changes of the shoulders.      Impression    Impression:   1. No acute airspace abnormality.  2. Prominent pulmonary artery shadow, can be seen with pulmonary  hypertension.    I have personally reviewed the examination and initial interpretation  and I agree with the findings.    JANELL ARAMBULA MD         SYSTEM ID:  [id number]   US Lower Extremity Venous Duplex Bilateral     Value    Radiologist flags Deep vein thrombosis (Urgent)    Narrative    EXAMINATION: DOPPLER VENOUS ULTRASOUND OF BILATERAL LOWER EXTREMITIES,  9/4/2024 6:26 PM     COMPARISON: 6/18/2013    HISTORY: lower extremity edema    TECHNIQUE:  Gray-scale evaluation with compression, spectral flow and  color Doppler assessment of the deep venous system of both legs from  groin to knee, and then at the ankles.    FINDINGS:  Right:   Partial compressibility of the right CFV. No compressibility of the  popliteal vein, posterior tibial vein. The GSV is fully compressible.    Left:  Patent left CFV, profundus, femoral vein, popliteal vein, and  posterior tibial vein.       Impression    IMPRESSION:  Partially occlusive deep vein thrombosis of the right CFV. Fully  occlusive deep vein thrombosis of the right popliteal and posterior  tibial vein.      [Urgent Result: Deep vein thrombosis]    Finding was identified on 9/4/2024 6:27 PM.     Dr. Freeman was contacted by Dr. Agustin Whaley at 9/4/2024 6:34 PM and  verbalized understanding of the  urgent finding.     I have personally reviewed the examination and initial interpretation  and I agree with the findings.    JANELL ARAMBULA MD         SYSTEM ID:  [id number]   CT Abdomen Pelvis w Contrast    Narrative    EXAM: CT ABDOMEN PELVIS W CONTRAST  LOCATION: [facility]  DATE: 9/4/2024    INDICATION: low hgb, abdominal pain  COMPARISON: Same day bilateral lower extremity ultrasound, Pelvic ultrasound 6/26/2013  TECHNIQUE: CT scan of the abdomen and pelvis was performed following injection of IV contrast. Multiplanar reformats were obtained. Dose reduction techniques were used.  CONTRAST: 135ml isovue 370    FINDINGS:   LOWER CHEST: Please see report from concurrent chest CT for findings above the diaphragm.    HEPATOBILIARY: Cholecystectomy. No evidence of biliary obstruction. Perfusional changes adjacent to falciform. No suspicious liver lesion visualized.    PANCREAS: Normal.    SPLEEN: Normal.    ADRENAL GLANDS: Normal.    KIDNEYS/BLADDER: Normal.    BOWEL: No hydronephrosis. Urinary bladder is unremarkable.    LYMPH NODES: Multiple enlarged retroperitoneal lymph nodes with retrocaval mass measuring 4.1 x 3.4 cm (series 4 image 97).    VASCULATURE: Filling defect noted in the right common femoral vein (series 4 image 331) likely correspond to findings on same day venous ultrasound..    PELVIC ORGANS: Likely irregular mass in the cervix measuring up to 8.2 cm (series 7 image 78). Possible superimposed uterine fibroids.    MUSCULOSKELETAL: No acute bony abnormality or suspicious osseous lesions. Mild body wall edema.      Impression    IMPRESSION:   1.  Likely large cervical mass suspicious for primary neoplasm.  2.  Enlarged retroperitoneal lymph nodes, suspicious for disease involvement.  3.  Deep vein thrombus in the right common femoral vein, as seen on same day vascular ultrasound.    Findings discussed with Dr. Freeman at 9:42 PM on 9/4/2024 by Dr. Gutierrez    CT Chest Pulmonary Embolism w Contrast     Value    Radiologist flags (AA)     New diagnosis of pulmonary embolism and presumed metastatic adenopathy.    Narrative    EXAM: CT CHEST PULMONARY EMBOLISM W CONTRAST  LOCATION: [facility]  DATE: 9/4/2024    INDICATION: hypoxia, elevated d dimer  COMPARISON: CT AP earlier today, CT chest 4/19/2013, CTA chest 2/7/2013  TECHNIQUE: CT chest pulmonary angiogram during arterial phase injection of IV contrast. Multiplanar reformats and MIP reconstructions were performed. Dose reduction techniques were used.   CONTRAST: 135ml isovue 370    FINDINGS:  ANGIOGRAM CHEST: Excellent opacification of pulmonary arteries and branches. Bilateral segmental and subsegmental pulmonary emboli noted in the right upper, middle and both lower lobes. Main pulmonary artery is enlarged to 3.9 cm.  No RV strain. Aorta   and branches are patent.    LUNGS AND PLEURA: There is a continued mosaic appearance to the lungs with bandlike areas of fibroatelectasis anteriorly in both upper lobes, decreased from before. No effusions. No intralobular septal thickening.    MEDIASTINUM/AXILLAE: There a few small left supraclavicular nodes measuring up to a centimeter. There is an enlarged lower right para-aortic node above the hiatus measuring 1.5 cm (axial image 262). No axillary adenopathy. Multiple mediastinal and   bilateral hilar nodes are bit more prominent than before with the largest measuring 1.2 cm (axial image 133). No pericardiophrenic nodes. There is a trace pericardial effusion.    CORONARY ARTERY CALCIFICATION: Cannot evaluate.    UPPER ABDOMEN: Enlarged retrocaval node noted at the level of the right renal artery measuring 4.6 x 2.9 cm (axial image 321). Multiple smaller left para-aortic nodes are present. Incidental splenule. Marked pancreatic atrophy. Prior cholecystectomy.      MUSCULOSKELETAL: No suspicious lesions. Third spacing of fluid with  subcutaneous edema especially dependently.      Impression    IMPRESSION:  1.  Patient has bilateral segmental and subsegmental pulmonary emboli without RV strain.  2.  Main pulmonary artery is enlarged at 3.9 cm, little changed over the last decade and consistent with pulmonary arterial hypertension..  3.  Suspicious adenopathy as noted above in the left supraclavicular, lower right para-aortic and  left retrocaval region, highly suspicious for malignancy, likely cervical (see report of CT AP earlier today).  4.  Mosaic appearance to the lungs again noted with fewer areas of fibroatelectasis. Long-standing appearance suggests chronic small airway disease.  5.  There is a very small pericardial effusion.    [Critical Result: New diagnosis of pulmonary embolism and presumed metastatic adenopathy.]    Finding was identified on 9/4/2024 9:08 PM CDT.     1.  Dr. Barry Freeman was contacted by me on 9/4/2024 9:10 PM CDT and verbalized understanding of the critical result.    XR Chest Port 1 View    Narrative    Portable chest    INDICATION short of breath, hypoxia    COMPARISON: Yesterday    FINDINGS: Heart size mildly enlarged. Streaky perihilar densities are  subtly more notable which may indicate some increasing airway  thickening associated the suprahilar areas bilaterally. Prominent  degenerative spurring in the thoracic spine. Calcific body near the  greater tuberosity right humeral head may indicate calcific tendinitis  is. This is unchanged from yesterday's radiograph.      Impression    Impression: Increasing suprahilar streaky opacities and bronchial wall  thickening may indicate slightly increasing inflammatory change.  Upright PA and lateral examination when clinical condition permits is  recommended for better detail. Thoracic spondylosis. Apparent calcific  tendinitis of supraspinatus at the right shoulder. Cardiomegaly.    LIVAN POWER MD         SYSTEM ID:  [id number]   IR IVC Filter Placement     Narrative    PROCEDURES 9/6/2024 4:33 PM:  1. Right common femoral and right internal jugular access using  sonographic guidance.  2. Diagnostic bilateral  pelvic angiography  3. Left uterine artery embolization.  4. Closure of right CFA arteriotomy with Angioseal.  5. Inferior venocavagram.  6. Insertion of IVC filter.    CLINICAL HISTORY:   67 y/o F with increased vaginal bleeding. embolization of bleed. PEs,  contraindication to filter, IVC insertion needed.        COMPARICT chest and abdomen 9/4/29/4/24    ATTENDING RADIOLOGIST: CHAN Prasad MD    FELLOW/Franca Brown, MD    MEDICATIONS: The patient was placed on continuous monitoring. Vital  signs and sedation were monitored by the Interventional Radiology  nurse under the Attending Physician's [id number] mcg fentanyl, 4 mg  versed were administered intravenouslyously. The patient remained  stable throughout the procedure.    ATTENDING FACE-TO-FACE [id number] minutesnutes    FLUOROSCOPY Time: 43.6 minutes     mGy1 mGy    PROCEDURE: The patient understood the limitations, alternatives, and  risks of the procedure and requested the procedure be performed. Both  written and verbal consent were obtained.    Uterine Artery Embolization. The bilateral groins and right neck were  prepped and draped in the usual sterile fashion. Preprocedural  ultrasound of the right groin demonstrated patent right common femoral  artery. The subcutaneous tissues over the vessel were anesthetized  local infiltration 1% lidocaine without epinephrine. The right common  femoral artery was accessed with a 21-gauge micropuncture needle and  introducer wire advanced into the vessel. Needle exchanged for an  introducer sheath. Through the introducer sheath Limited right  iliofemoral angiography was performed which demonstrated a  satisfactory puncture at the level of the mid femoral head. Bentson  wire was advanced through the introducer sheath into the abdominal  aorta and sheath  exchanged for a short 5 U[place] vascular sheath. An  Omni flush catheter was advanced into the abdominal aorta which was  then used to performed bilateral pelvic angiography with findings as  follows: Prominent left uterine artery arising as a first branch from  the anterior division. An Omni flush catheter was advanced over the  wire and used to catheterize the left common iliac artery with a  Glidewire. A Glover [place] tip catheter was used to catheterize the  left internal iliac artery and uterine artery. Angiography performed  which demonstrated abnormal appearing vessels and neovascularity  supplying the cervical tumor. Embolization was performed with a vial  of 400 um HydroPearls and 600 um HydroPearls followed by 3 cc of  Gelfoam mixture after which completion angiography demonstrated near  stasis.    Next, we turned our attention towards the right internal iliac  vasculature. We attempted to engage the right internal iliac artery  with the Glover pigtail catheter but were unable to do so. We  removed to engage the right internal iliac artery with a 2.8 Uzbek  Progreat catheter.  Intermittent angiography was performed for  roadmapping purposes and attempts to catheterize the right uterine  artery, however, this is difficult. The right uterine artery was much  smaller than the left and with less neovascularity. Due to total  fluoroscopy time and the increasing doses of the case approaching 3  gray the decision was made to abort further attempts at embolizing the  right uterine artery.    Catheters and wires were removed. Hemostasis was obtained at the right  CFA arteriotomy using a 6 U[place]bek Angio-Seal. Sterile dressing applied.    IVC Filter Insertion  Preprocedural ultrasound of the right internal jugular vein  demonstrated a patent vessel. The skin overlying the vein was  anesthetized with local infiltration of 1% lidocaine without  epinephrine. The vessel was then accessed with a  21-gauge  micropuncture needle. An image was saved to the medical record  documenting needle position within the vessel. An introducer wire was  advanced into the vein and needle exchanged for an introducer sheath.  Through this introducer sheath and Bentson wire was advanced into the  inferior vena cava. The sheath for the technology was then advanced  over the wire and into the left common iliac vein. Inferior  venacavogram performed with injection via the left common iliac vein  and also from the IVC which demonstrated the origin of the left renal  vein at the level of the superior endplate of L2. Position of the  renal veins was confirmed with a repeat injection. After which, the  IVC filter was deployed at this level. Repeat venogram through the  sheath was performed which confirmed satisfactory position of the  filter. Sheath removed. Hemostasis obtained at the neck using manual  pressure. Sterile dressing applied.    No immediate competition.      Impression    IMPRESSION:  1. Left pelvic angiography demonstrates a hypertrophied left uterine  artery with neovascularity supplying the cervical mass. This was  embolized with particles and Gelfoam. Right pelvic angiography  demonstrated a small slightly atrophic right uterine artery. This  could not be catheterized so only the left side was treated. No areas  of active extravasation noted on angiography.   2. IVC filter insertion.    PLAN:  Patient should return for IVC filter retrieval once anticoagulation is  no longer contraindicated.    I have personally reviewed the examination and initial interpretation  and I agree with the findings.    JULIETH STEPHENS MD         SYSTEM ID:  [id number]   IR Uterine Artery Non Fibroid Embo    Narrative    PROCEDURES 9/6/2024 4:33 PM:  1. Right common femoral and right internal jugular access using  sonographic guidance.  2. Diagnostic bilateral  pelvic angiography  3. Left uterine artery embolization.  4. Closure of right  CFA arteriotomy with Angioseal.  5. Inferior venocavagram.  6. Insertion of IVC filter.    CLINICAL HISTORY:   69 y/o F with increased vaginal bleeding. embolization of bleed. PEs,  contraindication to filter, IVC insertion needed.        COMPARICT chest and abdomen 9/4/29/4/24    ATTENDING RADIOLOGIST: CHAN Prasad MD    FELLOW/Franca Brown, MD    MEDICATIONS: The patient was placed on continuous monitoring. Vital  signs and sedation were monitored by the Interventional Radiology  nurse under the Attending Physician's [id number] mcg fentanyl, 4 mg  versed were administered intravenouslyously. The patient remained  stable throughout the procedure.    ATTENDING FACE-TO-FACE [id number] minutesnutes    FLUOROSCOPY Time: 43.6 minutes     mGy1 mGy    PROCEDURE: The patient understood the limitations, alternatives, and  risks of the procedure and requested the procedure be performed. Both  written and verbal consent were obtained.    Uterine Artery Embolization. The bilateral groins and right neck were  prepped and draped in the usual sterile fashion. Preprocedural  ultrasound of the right groin demonstrated patent right common femoral  artery. The subcutaneous tissues over the vessel were anesthetized  local infiltration 1% lidocaine without epinephrine. The right common  femoral artery was accessed with a 21-gauge micropuncture needle and  introducer wire advanced into the vessel. Needle exchanged for an  introducer sheath. Through the introducer sheath Limited right  iliofemoral angiography was performed which demonstrated a  satisfactory puncture at the level of the mid femoral head. Bentson  wire was advanced through the introducer sheath into the abdominal  aorta and sheath exchanged for a short 5 Uzbek vascular sheath. An  Omni flush catheter was advanced into the abdominal aorta which was  then used to performed bilateral pelvic angiography with findings as  follows: Prominent left uterine artery arising  as a first branch from  the anterior division. An Omni flush catheter was advanced over the  wire and used to catheterize the left common iliac artery with a  Glidewire. A Glover [place] tip catheter was used to catheterize the  left internal iliac artery and uterine artery. Angiography performed  which demonstrated abnormal appearing vessels and neovascularity  supplying the cervical tumor. Embolization was performed with a vial  of 400 um HydroPearls and 600 um HydroPearls followed by 3 cc of  Gelfoam mixture after which completion angiography demonstrated near  stasis.    Next, we turned our attention towards the right internal iliac  vasculature. We attempted to engage the right internal iliac artery  with the Glover pigtail catheter but were unable to do so. We  removed to engage the right internal iliac artery with a 2.8 Malay  Progreat catheter.  Intermittent angiography was performed for  roadmapping purposes and attempts to catheterize the right uterine  artery, however, this is difficult. The right uterine artery was much  smaller than the left and with less neovascularity. Due to total  fluoroscopy time and the increasing doses of the case approaching 3  gray the decision was made to abort further attempts at embolizing the  right uterine artery.    Catheters and wires were removed. Hemostasis was obtained at the right  CFA arteriotomy using a 6 Malay Angio-Seal. Sterile dressing applied.    IVC Filter Insertion  Preprocedural ultrasound of the right internal jugular vein  demonstrated a patent vessel. The skin overlying the vein was  anesthetized with local infiltration of 1% lidocaine without  epinephrine. The vessel was then accessed with a 21-gauge  micropuncture needle. An image was saved to the medical record  documenting needle position within the vessel. An introducer wire was  advanced into the vein and needle exchanged for an introducer sheath.  Through this introducer sheath and Corrina  wire was advanced into the  inferior vena cava. The sheath for the technology was then advanced  over the wire and into the left common iliac vein. Inferior  venacavogram performed with injection via the left common iliac vein  and also from the IVC which demonstrated the origin of the left renal  vein at the level of the superior endplate of L2. Position of the  renal veins was confirmed with a repeat injection. After which, the  IVC filter was deployed at this level. Repeat venogram through the  sheath was performed which confirmed satisfactory position of the  filter. Sheath removed. Hemostasis obtained at the neck using manual  pressure. Sterile dressing applied.    No immediate competition.      Impression    IMPRESSION:  1. Left pelvic angiography demonstrates a hypertrophied left uterine  artery with neovascularity supplying the cervical mass. This was  embolized with particles and Gelfoam. Right pelvic angiography  demonstrated a small slightly atrophic right uterine artery. This  could not be catheterized so only the left side was treated. No areas  of active extravasation noted on angiography.   2. IVC filter insertion.    PLAN:  Patient should return for IVC filter retrieval once anticoagulation is  no longer contraindicated.    I have personally reviewed the examination and initial interpretation  and I agree with the findings.    JULIETH STEPHENS MD         SYSTEM ID:  [id number]   XR Chest Port 1 View    Narrative    Exam: XR CHEST PORT 1 VIEW, 9/7/2024 11:25 AM    Comparison: Radiograph 9/5/2024    History: Hypoxia, cf volume overload or taco with recent blood  transfusions    Findings:  Portable AP view of the chest. Cardiomediastinal silhouette is stable.  Similar streaky perihilar opacities. There is no pneumothorax or  pleural effusion. The upper abdomen is unremarkable. Osseous  structures are stable.      Impression    Impression:   Similar streaky perihilar and interstitial opacities. No  new  consolidation.    I have personally reviewed the examination and initial interpretation  and I agree with the findings.    MITCHELL CHRISTY DO         SYSTEM ID:  [id number]   XR Chest Port 1 View    Narrative    EXAM:  XR CHEST PORT 1 VIEW    INDICATION: picc placement    COMPARISON:  Chest x-ray 9/7/2024, CT PE 9/4/2024    FINDINGS:  Single AP view of the chest. Right approach PICC terminates over the  cavoatrial junction.    Cardiomediastinal silhouette within normal limits. Bilateral mixed  perihilar opacities are slightly increased since prior. No  pneumothorax.  No pleural effusion.       Impression    IMPRESSION:  1.  Right approach PICC terminates over the cavoatrial junction.  2.  Slightly increased pulmonary edema.    I have personally reviewed the examination and initial interpretation  and I agree with the findings.    RADHIKA BROWN MD         SYSTEM ID:  [id number]   XR Chest Port 1 View    Narrative    Exam: XR CHEST PORT 1 VIEW, 9/9/2024 6:14 AM    Comparison: 9/8/2024 chest x-ray    History: pulmonary infiltrates    Findings:  Single portable view of the chest. Right arm PICC tip stable  projecting over the superior cavoatrial junction. Trachea is midline.  Cardiomediastinal silhouette is stable. Similar to slightly decreased  perihilar and basilar streaky opacities. No pneumothorax or pleural  effusion.       Impression    Impression: Similar to slightly decreased perihilar and bibasilar  streaky pulmonary opacities, may represent pulmonary edema,  atelectasis and or atypical infection    I have personally reviewed the examination and initial interpretation  and I agree with the findings.    VAIBHAV GEORGES MD         SYSTEM ID:  [id number]   CT Chest w/o Contrast    Narrative    CT chest without contrast    INDICATION: Worsening hypoxia requiring BiPAP    COMPARISON: Plain film yesterday. CT pulmonary angiogram 9/4/2024    FINDINGS: No contrast. The included thyroid appears unremarkable. A  right  upper extremity PICC line tip is in the distal SVC just before  the right atrium. No obviously abnormal breast tissues. Edema in the  chest wall subcutaneous tissues bilaterally.  Small bilateral pleural effusions. Borderline pericardial effusion.  The included upper abdomen appears unremarkable.  Bone detail shows mild dextrocurvature centered at the midthoracic  spine. Multilevel osteophyte formation throughout the thoracic spine  mostly on the right. Mild lower cervical and upper lumbar spondylosis  is also. No suspicious sclerotic or lytic/destructive lesion.  Mild-to-moderate osteoarthritic changes at the glenohumeral joints  bilaterally with joint space narrowing and osteophyte formation.  Multistation mediastinal lymph nodes appears similar to previous.  Heart size normal. Aortic caliber normal. The pulmonary artery however  is mildly enlarged approximately 3.7 cm.    Detail of the lungs shows areas of bilateral hilar peribronchial  thickening bilaterally and also in the right upper lobe. Scattered  groundglass opacities in the upper lobes bilaterally.  Comparison with previous shows the perihilar an anterior  paramediastinal bronchial wall thickening to the slightly more  notable. Lower lobe peribronchial opacities are also slightly more  notable. Pleural effusions and bibasilar atelectasis are also new.      Impression    IMPRESSION: New opacities bilaterally lungs with other increase in  bronchial wall thickening in the anterior paramediastinal regions  bilaterally. Findings concerning for worsening inflammation/infection  along with new pleural effusions and probable associated bibasilar  edema in addition to atelectasis.  Pulmonary artery enlargement which may indicate pulmonary  hypertension.  Unchanged probable reactive mediastinal adenopathy.  Borderline pericardial effusion.    LIVAN POWER MD         SYSTEM ID:  [id number]   CT Head w/o Contrast    Narrative    CT HEAD W/O CONTRAST 9/10/2024  2:48 PM    History: Encephalopathy vs delirium, evaluate for acute process   ICD-10:    Comparison: None available    Technique: Using multidetector thin collimation helical acquisition  technique, axial, coronal and sagittal CT images from the skull base  to the vertex were obtained without intravenous contrast.   (topogram) image(s) also obtained and reviewed.    Findings: There is no intracranial hemorrhage, mass effect, or midline  shift. Gray/white matter differentiation in both cerebral hemispheres  is preserved. Ventricles are proportionate to the cerebral sulci. The  basal cisterns are clear.    The bony calvaria and the bones of the skull base are normal. Mucous  retention cyst in the right maxillary sinus, otherwise the visualized  portions of the paranasal sinuses and mastoid air cells are clear.      Impression    Impression:  No acute intracranial pathology.     FREDERICK VO MD         SYSTEM ID:  [id number]   Echo Complete     Value    LVEF  > 65%    Narrative    [id number]  [id number]  [id number]  [id number]^ALLISON^LONNIE     [facility],[place]  Echocardiography Laboratory  [address] [place] [id number]     Name: TAMEKA CHANEY  MRN: [id number]  : 1955  Study Date: 2024 08:58 AM  Age: 68 yrs  Gender: Female  Patient Location: [facility]  Reason For Study: Pulmonary Emboli  Ordering Physician: LONNIE COOPER  Performed By: Alisha Kebede     BSA: 2.2 m2  Height: 66 in  Weight: 260 lb  BP: 108/49 mmHg  ______________________________________________________________________________  Procedure  Complete Portable Echo Adult.  ______________________________________________________________________________  Interpretation Summary  Left ventricular size, wall motion and function are normal. The ejection  fraction is > 65%. Flattened septum is consistent with right ventricular  pressure and volume overload.  Moderate right ventricular dilation is present.  Global right ventricular  function is mildly reduced. The mid to distal RV free wall is somewhat  hypokinetic  Trace to mild tricuspid insufficiency is present.  The inferior vena cava was normal in size with preserved respiratory  variability. Trivial pericardial effusion is present.     No recent study  ______________________________________________________________________________  Left Ventricle  Left ventricular size, wall motion and function are normal. The ejection  fraction is > 65%. Grade I or early diastolic dysfunction. Flattened septum is  consistent with right ventricular pressure and volume overload.     Right Ventricle  Moderate right ventricular dilation is present. Global right ventricular  function is mildly reduced. The mid to distal RV free wall is somewhat  hypokinetic.     Atria  Both atria appear normal.     Mitral Valve  Mild mitral annular calcification is present. Trace mitral insufficiency is  present.     Aortic Valve  Trileaflet aortic sclerosis without stenosis. No aortic regurgitation is  present.     Tricuspid Valve  The tricuspid valve is normal. Trace to mild tricuspid insufficiency is  present. The right ventricular systolic pressure is approximated at 30.9 mmHg  plus the right atrial pressure.     Pulmonic Valve  The pulmonic valve is normal.     Vessels  The aorta root is normal. The inferior vena cava was normal in size with  preserved respiratory variability.     Pericardium  Trivial pericardial effusion is present.     ______________________________________________________________________________  MMode/2D Measurements & Calculations  IVSd: 1.2 cm  LVIDd: 2.4 cm  LVIDs: 1.7 cm  LVPWd: 1.1 cm     FS: 30.1 %  LV mass(C)d: 77.4 grams  LV mass(C)dI: 34.6 grams/m2  Ao root diam: 3.1 cm  asc Aorta Diam: 3.5 cm  LVOT diam: 2.0 cm  LVOT area: 3.1 cm2  Ao root diam index Ht(cm/m): 1.9  Ao root diam index BSA (cm/m2): 1.4  Asc Ao diam index BSA (cm/m2): 1.6  Asc Ao diam index Ht(cm/m):  2.1  LA Volume (BP): 43.5 ml     LA Volume Index (BP): 19.4 ml/m2  RWT: 0.93  TAPSE: 1.8 cm     Doppler Measurements & Calculations  MV E max gian: 81.2 cm/sec  MV A max gian: 135.0 cm/sec  MV E/A: 0.60  MV dec time: 0.15 sec  Ao V2 max: 200.0 cm/sec  Ao max P.0 mmHg  Ao V2 mean: 133.0 cm/sec  Ao mean P.0 mmHg  Ao V2 VTI: 32.5 cm  MADISON(I,D): 1.9 cm2  MADISON(V,D): 1.9 cm2  LV V1 max P.1 mmHg  LV V1 max: 123.0 cm/sec  LV V1 VTI: 20.1 cm  SV(LVOT): 63.0 ml  SI(LVOT): 28.2 ml/m2  TR max gian: 278.1 cm/sec  TR max P.9 mmHg  AV Gian Ratio (DI): 0.61  MADISON Index (cm2/m2): 0.87  E/E' avg: 10.3  Lateral E/e': 10.5  Medial E/e': 10.1     RV S Gian: 10.0 cm/sec     ______________________________________________________________________________  Report approved by: Liz FLANAGAN 2024 09:42 AM         Echo Complete     Value    LVEF  60-65%    Narrative    [id number]  [id number]  [id number]  [id number]^KRYSTAL^LIBRA^SHERRIE BURNHAM     [facility],[place]  Echocardiography Laboratory  [address] [place] [id number]     Name: TAMEKA CHANEY  MRN: [id number]  : 1955  Study Date: 2024 08:47 AM  Age: 68 yrs  Gender: Female  Patient Location: [facility]  Reason For Study: Pulmonary Edema  Ordering Physician: LIBRA RICE  Performed By: Helen Santamaria RDCS     BSA: 2.2 m2  Height: 66 in  Weight: 260 lb  BP: 106/54 mmHg  ______________________________________________________________________________  Procedure  Echocardiogram with two-dimensional, color and spectral Doppler performed.  ______________________________________________________________________________  Interpretation Summary  Global and regional left ventricular function is normal with an EF of 60-65%.  Moderate right ventricular dilation is present.  Global right ventricular function is mildly to moderately reduced.  The right ventricular systolic pressure is 48mmHg above the right atrial  pressure.  IVC  diameter >2.1 cm collapsing <50% with sniff suggests a high RA pressure  estimated at 15 mmHg or greater.  Trivial pericardial effusion is present.  ______________________________________________________________________________  Left Ventricle  Global and regional left ventricular function is normal with an EF of 60-65%.     Right Ventricle  Moderate right ventricular dilation is present. Global right ventricular  function is mildly to moderately reduced.     Tricuspid Valve  Trace to mild tricuspid insufficiency is present. The right ventricular  systolic pressure is 48mmHg above the right atrial pressure.     Vessels  IVC diameter >2.1 cm collapsing <50% with sniff suggests a high RA pressure  estimated at 15 mmHg or greater.     Pericardium  Trivial pericardial effusion is present.  ______________________________________________________________________________  Doppler Measurements & Calculations  TR max maribell: 345.0 cm/sec  TR max P.6 mmHg     ______________________________________________________________________________  Report approved by: Liz Menon 2024 09:48 AM             Discharge Medications   Current Discharge Medication List        CONTINUE these medications which have NOT CHANGED    Details   ORDER FOR DME Please use BiPAP as directed by your sleep provider.  .            Allergies   Allergies   Allergen Reactions    Echinacea Difficulty breathing    Latex     Penicillins      Can't tolerate the smell of the tablet

## 2024-09-17 NOTE — PROGRESS NOTES
I was asked by coding to clarify diagnosis of HFpEF. Please add Chronic Heart Failure with Preserved Ejection Fraction to problem list.

## (undated) RX ORDER — HEPARIN SODIUM 1000 [USP'U]/ML
INJECTION, SOLUTION INTRAVENOUS; SUBCUTANEOUS
Status: DISPENSED
Start: 2024-01-01

## (undated) RX ORDER — LIDOCAINE HYDROCHLORIDE 10 MG/ML
INJECTION, SOLUTION EPIDURAL; INFILTRATION; INTRACAUDAL; PERINEURAL
Status: DISPENSED
Start: 2024-01-01

## (undated) RX ORDER — CLINDAMYCIN PHOSPHATE 900 MG/50ML
INJECTION, SOLUTION INTRAVENOUS
Status: DISPENSED
Start: 2024-01-01

## (undated) RX ORDER — FENTANYL CITRATE 50 UG/ML
INJECTION, SOLUTION INTRAMUSCULAR; INTRAVENOUS
Status: DISPENSED
Start: 2024-01-01

## (undated) RX ORDER — KETOROLAC TROMETHAMINE 30 MG/ML
INJECTION, SOLUTION INTRAMUSCULAR; INTRAVENOUS
Status: DISPENSED
Start: 2024-01-01

## (undated) RX ORDER — DEXAMETHASONE SODIUM PHOSPHATE 4 MG/ML
INJECTION, SOLUTION INTRA-ARTICULAR; INTRALESIONAL; INTRAMUSCULAR; INTRAVENOUS; SOFT TISSUE
Status: DISPENSED
Start: 2024-01-01

## (undated) RX ORDER — FERRIC SUBSULFATE 0.21 G/G
LIQUID TOPICAL
Status: DISPENSED
Start: 2024-01-01